# Patient Record
Sex: FEMALE | Race: AMERICAN INDIAN OR ALASKA NATIVE | NOT HISPANIC OR LATINO
[De-identification: names, ages, dates, MRNs, and addresses within clinical notes are randomized per-mention and may not be internally consistent; named-entity substitution may affect disease eponyms.]

---

## 2017-01-06 ENCOUNTER — MEDICATION RENEWAL (OUTPATIENT)
Age: 67
End: 2017-01-06

## 2017-01-10 ENCOUNTER — APPOINTMENT (OUTPATIENT)
Dept: INTERNAL MEDICINE | Facility: CLINIC | Age: 67
End: 2017-01-10

## 2017-01-10 ENCOUNTER — RESULT CHARGE (OUTPATIENT)
Age: 67
End: 2017-01-10

## 2017-01-10 VITALS
BODY MASS INDEX: 28.27 KG/M2 | SYSTOLIC BLOOD PRESSURE: 130 MMHG | WEIGHT: 144 LBS | HEIGHT: 60 IN | DIASTOLIC BLOOD PRESSURE: 64 MMHG

## 2017-01-10 PROBLEM — R00.2 PALPITATIONS: Status: ACTIVE | Noted: 2017-01-10

## 2017-01-10 LAB
GLUCOSE BLDC GLUCOMTR-MCNC: 86
HBA1C MFR BLD HPLC: 7.5

## 2017-01-13 ENCOUNTER — RX RENEWAL (OUTPATIENT)
Age: 67
End: 2017-01-13

## 2017-01-15 ENCOUNTER — NON-APPOINTMENT (OUTPATIENT)
Age: 67
End: 2017-01-15

## 2017-01-31 ENCOUNTER — APPOINTMENT (OUTPATIENT)
Dept: OPHTHALMOLOGY | Facility: CLINIC | Age: 67
End: 2017-01-31

## 2017-01-31 DIAGNOSIS — H26.9 UNSPECIFIED CATARACT: ICD-10-CM

## 2017-01-31 DIAGNOSIS — H04.123 DRY EYE SYNDROME OF BILATERAL LACRIMAL GLANDS: ICD-10-CM

## 2017-02-01 DIAGNOSIS — E55.9 VITAMIN D DEFICIENCY, UNSPECIFIED: ICD-10-CM

## 2017-02-01 RX ORDER — UBIDECARENONE/VIT E ACET 100MG-5
50 MCG CAPSULE ORAL
Refills: 0 | Status: ACTIVE | COMMUNITY

## 2017-02-07 ENCOUNTER — RESULT REVIEW (OUTPATIENT)
Age: 67
End: 2017-02-07

## 2017-04-25 ENCOUNTER — APPOINTMENT (OUTPATIENT)
Dept: INTERNAL MEDICINE | Facility: CLINIC | Age: 67
End: 2017-04-25

## 2017-04-25 ENCOUNTER — RESULT CHARGE (OUTPATIENT)
Age: 67
End: 2017-04-25

## 2017-04-25 VITALS
DIASTOLIC BLOOD PRESSURE: 70 MMHG | WEIGHT: 142 LBS | HEIGHT: 60 IN | SYSTOLIC BLOOD PRESSURE: 142 MMHG | BODY MASS INDEX: 27.88 KG/M2

## 2017-04-25 DIAGNOSIS — K64.9 UNSPECIFIED HEMORRHOIDS: ICD-10-CM

## 2017-04-25 LAB
BILIRUB UR QL STRIP: NORMAL
CLARITY UR: CLEAR
COLLECTION METHOD: NORMAL
GLUCOSE UR-MCNC: NORMAL
HCG UR QL: 0.2 EU/DL
HGB UR QL STRIP.AUTO: NORMAL
KETONES UR-MCNC: NORMAL
LEUKOCYTE ESTERASE UR QL STRIP: NORMAL
NITRITE UR QL STRIP: NORMAL
PH UR STRIP: 6
PROT UR STRIP-MCNC: NORMAL
SP GR UR STRIP: 1.01

## 2017-04-25 RX ORDER — GLYCERIN/MIN OIL/POLYCARBOPHIL
GEL WITH APPLICATOR (GRAM) VAGINAL
Qty: 1 | Refills: 0 | Status: ACTIVE | COMMUNITY
Start: 2017-04-25 | End: 1900-01-01

## 2017-04-25 RX ORDER — HYDROCORTISONE ACETATE 25 MG/1
25 SUPPOSITORY RECTAL TWICE DAILY
Qty: 24 | Refills: 0 | Status: COMPLETED | COMMUNITY
Start: 2017-04-25 | End: 2017-05-07

## 2017-06-21 ENCOUNTER — MEDICATION RENEWAL (OUTPATIENT)
Age: 67
End: 2017-06-21

## 2017-06-22 ENCOUNTER — RX RENEWAL (OUTPATIENT)
Age: 67
End: 2017-06-22

## 2017-07-07 ENCOUNTER — MEDICATION RENEWAL (OUTPATIENT)
Age: 67
End: 2017-07-07

## 2017-07-13 ENCOUNTER — EMERGENCY (EMERGENCY)
Facility: HOSPITAL | Age: 67
LOS: 1 days | End: 2017-07-13
Attending: EMERGENCY MEDICINE | Admitting: EMERGENCY MEDICINE
Payer: MEDICARE

## 2017-07-13 VITALS
TEMPERATURE: 99 F | DIASTOLIC BLOOD PRESSURE: 83 MMHG | OXYGEN SATURATION: 97 % | RESPIRATION RATE: 18 BRPM | SYSTOLIC BLOOD PRESSURE: 165 MMHG | HEART RATE: 63 BPM

## 2017-07-13 VITALS
DIASTOLIC BLOOD PRESSURE: 84 MMHG | HEART RATE: 56 BPM | RESPIRATION RATE: 16 BRPM | TEMPERATURE: 98 F | SYSTOLIC BLOOD PRESSURE: 161 MMHG | OXYGEN SATURATION: 96 %

## 2017-07-13 PROCEDURE — 99284 EMERGENCY DEPT VISIT MOD MDM: CPT | Mod: 25

## 2017-07-13 PROCEDURE — 73502 X-RAY EXAM HIP UNI 2-3 VIEWS: CPT | Mod: 26,LT

## 2017-07-13 PROCEDURE — 73502 X-RAY EXAM HIP UNI 2-3 VIEWS: CPT

## 2017-07-13 PROCEDURE — 71046 X-RAY EXAM CHEST 2 VIEWS: CPT

## 2017-07-13 PROCEDURE — 71020: CPT | Mod: 26

## 2017-07-13 RX ORDER — ACETAMINOPHEN 500 MG
650 TABLET ORAL ONCE
Qty: 0 | Refills: 0 | Status: COMPLETED | OUTPATIENT
Start: 2017-07-13 | End: 2017-07-13

## 2017-07-13 RX ADMIN — Medication 650 MILLIGRAM(S): at 10:08

## 2017-07-13 NOTE — ED PROVIDER NOTE - CARE PLAN
Principal Discharge DX:	Hip pain  Instructions for follow-up, activity and diet:	1) You were here for hip pain.    2) Take motrin or tylenol as needed for pain.    3) Follow up with your primary doctor for further evaluation and to answer any questions you have.    4) Return to the emergency department if you experience worsening symptoms, pain, fever, chills, nausea, vomiting or other concerning symptoms.

## 2017-07-13 NOTE — ED PROVIDER NOTE - PLAN OF CARE
1) You were here for hip pain.    2) Take motrin or tylenol as needed for pain.    3) Follow up with your primary doctor for further evaluation and to answer any questions you have.    4) Return to the emergency department if you experience worsening symptoms, pain, fever, chills, nausea, vomiting or other concerning symptoms.

## 2017-07-13 NOTE — ED PROVIDER NOTE - NS ED ROS FT
Constitutional: No fever or chills  Eyes: No visual changes  HEENT: No throat pain  CV: No chest pain  Resp: No SOB no cough  GI: No abd pain, nausea or vomiting  : No dysuria  MSK: As per hpi  Skin: No rash  Neuro: No headache

## 2017-07-13 NOTE — ED PROVIDER NOTE - ATTENDING CONTRIBUTION TO CARE
Patient presenting with L sided hip pain after fall 4 days ago.  Able to stand and ambulate after fall, having continued pain.  No head trauma or LOC, no numbness, tingling and weakness.    On exam patient well appearing, vital signs within normal limits, in no apparent distress.  No noted traumatic injuries on exam, slight L posterior iliac wing/lower lumbar tenderness.    Plain films for fracture/injury, pain control re-evaluate.

## 2017-07-13 NOTE — ED PROVIDER NOTE - OBJECTIVE STATEMENT
66F with past medical history Hypertension, hl, diabetes mellitus presents with with 4d h/o LLBP since fall.  Had mechanical fall 4d ago when tripped over stoop, fell onto L side with immediate pain in LLB/hip.  Able to ambulate immediately but pain has persisted.  Pain is dull, constant, w/w laying down.  Denies radiation down L leg, numbness, weakness, CP, shortness of breath, abdominal pain, fever, chills, bowel/bladder incontinence, diarrhea, constipation, rash.

## 2017-07-13 NOTE — ED ADULT NURSE NOTE - OBJECTIVE STATEMENT
66 yrs old female with h/o htn, dm present to the ER for generalized weakness and pain to the left hip and shoulder. Pt reports that she was not feeling well on Saturday and was experiencing  dizziness and generalized weakness when she fell down the steps in her house. Pt denies hitting her head or LOC. Pt however complaining of pain to left shoulder and hip. Report pain level of 8/10 on pain scale and aching in quality. Pt is experiencing difficulty bending , however she is able to ambulate independently. Pt also report burning sensation in the perineal area for a couple of months now and state she was using OTC cream which has not been working. Pt denies nausea, vomiting, shortness of breath/ CP

## 2017-07-13 NOTE — ED PROVIDER NOTE - PHYSICAL EXAMINATION
***GEN - well appearing; NAD   ***HEAD - NC/AT  ***EYES/NOSE - PEERL, EOMI, mucous membranes moist, no discharge   ***THROAT: Oral cavity and pharynx normal. No inflammation, swelling, exudate, or lesions.    ***NECK: supple, non-tender no lymphadenopathy  ***PULMONARY - CTA b/l, symmetric breath sounds.   ***CARDIAC- s1s2, RRR, no murmur  ***ABDOMEN - +BS, ND, NT, soft, no guarding, no rebound, no organomegaly  ***BACK - no CVA tenderness, Normal  spine   ***EXTREMITIES - symmetric pulses, 2+ dp, capillary refill < 2 seconds, no clubbing, no cyanosis, no edema   ***SKIN - warm, dry, intact, no rash or bruising   ***NEUROLOGIC - a&o x3, CN3-12 intact, sensation nl, motor 5/5 RUE/LUE/RLE/LLE gait normal,

## 2017-07-25 ENCOUNTER — APPOINTMENT (OUTPATIENT)
Dept: INTERNAL MEDICINE | Facility: CLINIC | Age: 67
End: 2017-07-25

## 2017-07-25 VITALS
WEIGHT: 141 LBS | HEART RATE: 66 BPM | HEIGHT: 60 IN | OXYGEN SATURATION: 98 % | DIASTOLIC BLOOD PRESSURE: 80 MMHG | SYSTOLIC BLOOD PRESSURE: 140 MMHG | BODY MASS INDEX: 27.68 KG/M2

## 2017-07-25 DIAGNOSIS — N94.9 UNSPECIFIED CONDITION ASSOCIATED WITH FEMALE GENITAL ORGANS AND MENSTRUAL CYCLE: ICD-10-CM

## 2017-07-25 LAB
GLUCOSE BLDC GLUCOMTR-MCNC: 149
HBA1C MFR BLD HPLC: 7.4

## 2017-09-14 ENCOUNTER — APPOINTMENT (OUTPATIENT)
Dept: OBGYN | Facility: CLINIC | Age: 67
End: 2017-09-14
Payer: MEDICARE

## 2017-09-14 VITALS
SYSTOLIC BLOOD PRESSURE: 140 MMHG | BODY MASS INDEX: 27.54 KG/M2 | WEIGHT: 140.25 LBS | DIASTOLIC BLOOD PRESSURE: 70 MMHG | HEIGHT: 60 IN

## 2017-09-14 DIAGNOSIS — Z01.419 ENCOUNTER FOR GYNECOLOGICAL EXAMINATION (GENERAL) (ROUTINE) W/OUT ABNORMAL FINDINGS: ICD-10-CM

## 2017-09-14 DIAGNOSIS — N95.2 POSTMENOPAUSAL ATROPHIC VAGINITIS: ICD-10-CM

## 2017-09-14 LAB
GLUCOSE UR-MCNC: NORMAL
HCG UR QL: 0.2 EU/DL
HGB UR QL STRIP.AUTO: NORMAL
KETONES UR-MCNC: NORMAL
LEUKOCYTE ESTERASE UR QL STRIP: NORMAL
NITRITE UR QL STRIP: NORMAL
PH UR STRIP: 7
PROT UR STRIP-MCNC: NORMAL
SP GR UR STRIP: 1.01

## 2017-09-14 PROCEDURE — 99387 INIT PM E/M NEW PAT 65+ YRS: CPT | Mod: 25,GY

## 2017-09-14 PROCEDURE — 81002 URINALYSIS NONAUTO W/O SCOPE: CPT

## 2017-09-14 RX ORDER — ESTRADIOL 10 UG/1
10 TABLET VAGINAL
Qty: 1 | Refills: 4 | Status: ACTIVE | COMMUNITY
Start: 2017-09-14 | End: 1900-01-01

## 2017-09-16 LAB — BACTERIA UR CULT: NORMAL

## 2017-09-18 ENCOUNTER — RX RENEWAL (OUTPATIENT)
Age: 67
End: 2017-09-18

## 2017-09-19 LAB — CYTOLOGY CVX/VAG DOC THIN PREP: NORMAL

## 2017-12-28 ENCOUNTER — RX RENEWAL (OUTPATIENT)
Age: 67
End: 2017-12-28

## 2018-02-09 ENCOUNTER — RX RENEWAL (OUTPATIENT)
Age: 68
End: 2018-02-09

## 2018-03-12 ENCOUNTER — APPOINTMENT (OUTPATIENT)
Dept: INTERNAL MEDICINE | Facility: CLINIC | Age: 68
End: 2018-03-12
Payer: MEDICARE

## 2018-03-12 VITALS
WEIGHT: 140 LBS | BODY MASS INDEX: 27.48 KG/M2 | HEART RATE: 76 BPM | OXYGEN SATURATION: 98 % | SYSTOLIC BLOOD PRESSURE: 138 MMHG | DIASTOLIC BLOOD PRESSURE: 76 MMHG | HEIGHT: 60 IN

## 2018-03-12 LAB
GLUCOSE BLDC GLUCOMTR-MCNC: 145
HBA1C MFR BLD HPLC: 7.1

## 2018-03-12 PROCEDURE — 83036 HEMOGLOBIN GLYCOSYLATED A1C: CPT | Mod: QW

## 2018-03-12 PROCEDURE — 99213 OFFICE O/P EST LOW 20 MIN: CPT | Mod: 25

## 2018-03-12 PROCEDURE — 82962 GLUCOSE BLOOD TEST: CPT

## 2018-03-27 ENCOUNTER — NON-APPOINTMENT (OUTPATIENT)
Age: 68
End: 2018-03-27

## 2018-03-27 ENCOUNTER — APPOINTMENT (OUTPATIENT)
Dept: INTERNAL MEDICINE | Facility: CLINIC | Age: 68
End: 2018-03-27
Payer: MEDICARE

## 2018-03-27 VITALS
SYSTOLIC BLOOD PRESSURE: 140 MMHG | DIASTOLIC BLOOD PRESSURE: 72 MMHG | OXYGEN SATURATION: 98 % | HEART RATE: 62 BPM | BODY MASS INDEX: 27.88 KG/M2 | WEIGHT: 142 LBS | HEIGHT: 60 IN

## 2018-03-27 PROCEDURE — 90732 PPSV23 VACC 2 YRS+ SUBQ/IM: CPT

## 2018-03-27 PROCEDURE — G0439: CPT | Mod: 25

## 2018-03-27 PROCEDURE — G0009: CPT

## 2018-03-27 RX ORDER — SILVER SULFADIAZINE 10 MG/G
1 CREAM TOPICAL DAILY
Qty: 1 | Refills: 0 | Status: DISCONTINUED | COMMUNITY
Start: 2018-03-12 | End: 2018-03-27

## 2018-05-07 LAB
25(OH)D3 SERPL-MCNC: 32.2 NG/ML
ADJUSTED MITOGEN: >10 IU/ML
ADJUSTED TB AG: 0.07 IU/ML
ALBUMIN SERPL ELPH-MCNC: 4.5 G/DL
ALP BLD-CCNC: 75 U/L
ALT SERPL-CCNC: 15 U/L
ANION GAP SERPL CALC-SCNC: 14 MMOL/L
AST SERPL-CCNC: 18 U/L
BASOPHILS # BLD AUTO: 0.03 K/UL
BASOPHILS NFR BLD AUTO: 0.3 %
BILIRUB SERPL-MCNC: 0.3 MG/DL
BUN SERPL-MCNC: 9 MG/DL
CALCIUM SERPL-MCNC: 10.1 MG/DL
CHLORIDE SERPL-SCNC: 98 MMOL/L
CHOLEST SERPL-MCNC: 160 MG/DL
CHOLEST/HDLC SERPL: 3 RATIO
CO2 SERPL-SCNC: 29 MMOL/L
CREAT SERPL-MCNC: 0.61 MG/DL
CREAT SPEC-SCNC: 21 MG/DL
EOSINOPHIL # BLD AUTO: 0.12 K/UL
EOSINOPHIL NFR BLD AUTO: 1.3 %
FOLATE SERPL-MCNC: >20 NG/ML
GLUCOSE SERPL-MCNC: 92 MG/DL
HCT VFR BLD CALC: 42.6 %
HDLC SERPL-MCNC: 54 MG/DL
HGB BLD-MCNC: 13.5 G/DL
IMM GRANULOCYTES NFR BLD AUTO: 0.2 %
LDLC SERPL CALC-MCNC: 64 MG/DL
LYMPHOCYTES # BLD AUTO: 3.97 K/UL
LYMPHOCYTES NFR BLD AUTO: 44.1 %
M TB IFN-G BLD-IMP: NEGATIVE
MAN DIFF?: NORMAL
MCHC RBC-ENTMCNC: 29 PG
MCHC RBC-ENTMCNC: 31.7 GM/DL
MCV RBC AUTO: 91.4 FL
MICROALBUMIN 24H UR DL<=1MG/L-MCNC: <0.3 MG/DL
MICROALBUMIN/CREAT 24H UR-RTO: NORMAL
MONOCYTES # BLD AUTO: 0.56 K/UL
MONOCYTES NFR BLD AUTO: 6.2 %
NEUTROPHILS # BLD AUTO: 4.31 K/UL
NEUTROPHILS NFR BLD AUTO: 47.9 %
PLATELET # BLD AUTO: 231 K/UL
POTASSIUM SERPL-SCNC: 4.4 MMOL/L
PROT SERPL-MCNC: 8.3 G/DL
QUANTIFERON GOLD NIL: 0.07 IU/ML
RBC # BLD: 4.66 M/UL
RBC # FLD: 13.2 %
SODIUM SERPL-SCNC: 141 MMOL/L
T4 FREE SERPL-MCNC: 1.5 NG/DL
TRIGL SERPL-MCNC: 211 MG/DL
TSH SERPL-ACNC: 1.02 UIU/ML
VIT B12 SERPL-MCNC: 1132 PG/ML
WBC # FLD AUTO: 9.01 K/UL

## 2018-05-18 ENCOUNTER — RX RENEWAL (OUTPATIENT)
Age: 68
End: 2018-05-18

## 2018-06-04 ENCOUNTER — FORM ENCOUNTER (OUTPATIENT)
Age: 68
End: 2018-06-04

## 2018-06-05 ENCOUNTER — APPOINTMENT (OUTPATIENT)
Dept: MAMMOGRAPHY | Facility: IMAGING CENTER | Age: 68
End: 2018-06-05
Payer: MEDICARE

## 2018-06-05 ENCOUNTER — OUTPATIENT (OUTPATIENT)
Dept: OUTPATIENT SERVICES | Facility: HOSPITAL | Age: 68
LOS: 1 days | End: 2018-06-05
Payer: MEDICARE

## 2018-06-05 DIAGNOSIS — Z01.419 ENCOUNTER FOR GYNECOLOGICAL EXAMINATION (GENERAL) (ROUTINE) WITHOUT ABNORMAL FINDINGS: ICD-10-CM

## 2018-06-05 PROCEDURE — 77063 BREAST TOMOSYNTHESIS BI: CPT | Mod: 26

## 2018-06-05 PROCEDURE — 77063 BREAST TOMOSYNTHESIS BI: CPT

## 2018-06-05 PROCEDURE — 77067 SCR MAMMO BI INCL CAD: CPT | Mod: 26

## 2018-06-05 PROCEDURE — 77067 SCR MAMMO BI INCL CAD: CPT

## 2018-07-19 PROBLEM — E11.9 TYPE 2 DIABETES MELLITUS WITHOUT COMPLICATIONS: Chronic | Status: ACTIVE | Noted: 2017-07-13

## 2018-07-19 NOTE — ED PROVIDER NOTE - PROGRESS NOTE DETAILS
Alba   ( Rachell )  calling to ask when Ilda should schedule Px and any immunizations if needed. [phone]   Patient feels better.  Pain improved.  XR negative.  Will discharge.

## 2018-07-22 PROBLEM — E55.9 VITAMIN D INSUFFICIENCY: Status: ACTIVE | Noted: 2017-02-01

## 2018-07-24 ENCOUNTER — APPOINTMENT (OUTPATIENT)
Dept: INTERNAL MEDICINE | Facility: CLINIC | Age: 68
End: 2018-07-24
Payer: MEDICARE

## 2018-07-24 VITALS
OXYGEN SATURATION: 98 % | WEIGHT: 143 LBS | BODY MASS INDEX: 28.07 KG/M2 | HEIGHT: 60 IN | SYSTOLIC BLOOD PRESSURE: 150 MMHG | DIASTOLIC BLOOD PRESSURE: 72 MMHG | HEART RATE: 71 BPM

## 2018-07-24 PROCEDURE — 99213 OFFICE O/P EST LOW 20 MIN: CPT

## 2018-07-24 NOTE — PHYSICAL EXAM
[No Acute Distress] : no acute distress [de-identified] : neck paraspinal muscle/trapezius muscle spasm. normal ROM and no swelling at both shoulders. normal strength at left shoulder [de-identified] : no skin changes over the left arm

## 2018-07-30 PROBLEM — N95.2 POSTMENOPAUSAL ATROPHIC VAGINITIS: Status: ACTIVE | Noted: 2017-04-25

## 2018-09-07 ENCOUNTER — RX RENEWAL (OUTPATIENT)
Age: 68
End: 2018-09-07

## 2018-11-29 ENCOUNTER — RX RENEWAL (OUTPATIENT)
Age: 68
End: 2018-11-29

## 2019-01-29 ENCOUNTER — NON-APPOINTMENT (OUTPATIENT)
Age: 69
End: 2019-01-29

## 2019-01-29 ENCOUNTER — APPOINTMENT (OUTPATIENT)
Dept: INTERNAL MEDICINE | Facility: CLINIC | Age: 69
End: 2019-01-29
Payer: MEDICARE

## 2019-01-29 ENCOUNTER — RX RENEWAL (OUTPATIENT)
Age: 69
End: 2019-01-29

## 2019-01-29 VITALS
WEIGHT: 143 LBS | DIASTOLIC BLOOD PRESSURE: 80 MMHG | BODY MASS INDEX: 28.07 KG/M2 | OXYGEN SATURATION: 97 % | HEIGHT: 60 IN | SYSTOLIC BLOOD PRESSURE: 132 MMHG | HEART RATE: 71 BPM

## 2019-01-29 LAB
GLUCOSE BLDC GLUCOMTR-MCNC: 137
HBA1C MFR BLD HPLC: 7.1

## 2019-01-29 PROCEDURE — 82962 GLUCOSE BLOOD TEST: CPT

## 2019-01-29 PROCEDURE — 99214 OFFICE O/P EST MOD 30 MIN: CPT | Mod: 25

## 2019-01-29 PROCEDURE — 83036 HEMOGLOBIN GLYCOSYLATED A1C: CPT | Mod: QW

## 2019-01-29 PROCEDURE — 93000 ELECTROCARDIOGRAM COMPLETE: CPT

## 2019-02-01 ENCOUNTER — RX RENEWAL (OUTPATIENT)
Age: 69
End: 2019-02-01

## 2019-02-03 NOTE — REVIEW OF SYSTEMS
[Negative] : Genitourinary [FreeTextEntry5] : see HPI [FreeTextEntry6] : s [FreeTextEntry7] : see HPI

## 2019-02-03 NOTE — COUNSELING
[Healthy eating counseling provided] : healthy eating [None] : None [Needs reinforcement, provided] : Patient needs reinforcement on understanding lifestyle changes and  the steps needed to achieve self management goals and reinforcement was provided

## 2019-02-03 NOTE — ASSESSMENT
[FreeTextEntry1] : 1.  Exertional shortness of breath with associated chest pressure in a diabetic with a history of hypertension and hyperlipidemia.  Patient with multiple CAD risk factors.  Needs a nuclear stress test to delineate for ischemia.  We will have her hold her beta-blocker for 5 days prior to a stress test.\par 2.  Diabetes -hemoglobin A1c 7.1.  Continue current management\par 3.  Hyperlipidemia -check lipids\par 4.  Abdominal pain, epigastric -differential includes gastritis, GERD, ulcer.  Will start Pepcid 20 mg twice a day so that I can obtain a stool for H. pylori.  After stress test, if that is negative, will send to GI.  She was actually referred to GI in the past but has been afraid to go.\par 5.  Hypertension -BP is acceptable.  Continue current management.  Needs renewed.\par 6.  Return to office after above.

## 2019-02-03 NOTE — HISTORY OF PRESENT ILLNESS
[de-identified] : 68-year-old female with a history of diabetes, hypertension and hyperlipidemia here C/O SOB with stairs, new over the past several weeks.  No chest pain but feels pressure across her chest.  No radiation to the jaw Feels it when she moves faster.  Can get palpitations as well.  No complaints of lightheadedness or dizziness.  No myalgias on her statin.  No complaints of polyuria or polydipsia.  Her sticks are okay in the mornings\par \par C/O epigastric discomfort for a long time, can be worse with food.  This never awakens her at night.

## 2019-03-03 LAB
25(OH)D3 SERPL-MCNC: 35.7 NG/ML
ALBUMIN SERPL ELPH-MCNC: 4.6 G/DL
ALP BLD-CCNC: 79 U/L
ALT SERPL-CCNC: 17 U/L
ANION GAP SERPL CALC-SCNC: 11 MMOL/L
AST SERPL-CCNC: 19 U/L
BASOPHILS # BLD AUTO: 0.02 K/UL
BASOPHILS NFR BLD AUTO: 0.2 %
BILIRUB SERPL-MCNC: 0.3 MG/DL
BUN SERPL-MCNC: 7 MG/DL
CALCIUM SERPL-MCNC: 9.7 MG/DL
CHLORIDE SERPL-SCNC: 103 MMOL/L
CHOLEST SERPL-MCNC: 130 MG/DL
CHOLEST/HDLC SERPL: 2.6 RATIO
CO2 SERPL-SCNC: 26 MMOL/L
CREAT SERPL-MCNC: 0.53 MG/DL
CREAT SPEC-SCNC: 12 MG/DL
EOSINOPHIL # BLD AUTO: 0.07 K/UL
EOSINOPHIL NFR BLD AUTO: 0.8 %
GLUCOSE SERPL-MCNC: 119 MG/DL
HBA1C MFR BLD HPLC: 7.2 %
HCT VFR BLD CALC: 39.5 %
HDLC SERPL-MCNC: 50 MG/DL
HGB BLD-MCNC: 12.6 G/DL
IMM GRANULOCYTES NFR BLD AUTO: 0.2 %
LDLC SERPL CALC-MCNC: 53 MG/DL
LYMPHOCYTES # BLD AUTO: 2.59 K/UL
LYMPHOCYTES NFR BLD AUTO: 31.3 %
MAN DIFF?: NORMAL
MCHC RBC-ENTMCNC: 27.9 PG
MCHC RBC-ENTMCNC: 31.9 GM/DL
MCV RBC AUTO: 87.4 FL
MICROALBUMIN 24H UR DL<=1MG/L-MCNC: <1.2 MG/DL
MICROALBUMIN/CREAT 24H UR-RTO: NORMAL
MONOCYTES # BLD AUTO: 0.46 K/UL
MONOCYTES NFR BLD AUTO: 5.6 %
NEUTROPHILS # BLD AUTO: 5.11 K/UL
NEUTROPHILS NFR BLD AUTO: 61.9 %
PLATELET # BLD AUTO: 200 K/UL
POTASSIUM SERPL-SCNC: 4.4 MMOL/L
PROT SERPL-MCNC: 7.5 G/DL
RBC # BLD: 4.52 M/UL
RBC # FLD: 13.6 %
SODIUM SERPL-SCNC: 140 MMOL/L
T4 FREE SERPL-MCNC: 1.4 NG/DL
TRIGL SERPL-MCNC: 133 MG/DL
TSH SERPL-ACNC: 1.03 UIU/ML
WBC # FLD AUTO: 8.27 K/UL

## 2019-03-04 ENCOUNTER — RX RENEWAL (OUTPATIENT)
Age: 69
End: 2019-03-04

## 2019-03-07 LAB — H PYLORI AG STL QL: DETECTED

## 2019-03-07 RX ORDER — AMOXICILLIN 500 MG/1
500 CAPSULE ORAL
Qty: 56 | Refills: 0 | Status: COMPLETED | COMMUNITY
Start: 2019-03-07 | End: 2019-03-21

## 2019-03-07 RX ORDER — METRONIDAZOLE 500 MG/1
500 TABLET ORAL TWICE DAILY
Qty: 28 | Refills: 0 | Status: COMPLETED | COMMUNITY
Start: 2019-03-07 | End: 2019-03-21

## 2019-03-07 RX ORDER — CLARITHROMYCIN 500 MG/1
500 TABLET, FILM COATED ORAL
Qty: 28 | Refills: 0 | Status: COMPLETED | COMMUNITY
Start: 2019-03-07 | End: 2019-03-21

## 2019-03-08 ENCOUNTER — RX RENEWAL (OUTPATIENT)
Age: 69
End: 2019-03-08

## 2019-03-08 RX ORDER — LANSOPRAZOLE 30 MG/1
30 CAPSULE, DELAYED RELEASE ORAL TWICE DAILY
Qty: 28 | Refills: 0 | Status: COMPLETED | COMMUNITY
Start: 2019-03-07 | End: 1900-01-01

## 2019-04-02 ENCOUNTER — APPOINTMENT (OUTPATIENT)
Dept: INTERNAL MEDICINE | Facility: CLINIC | Age: 69
End: 2019-04-02
Payer: MEDICARE

## 2019-04-02 VITALS
DIASTOLIC BLOOD PRESSURE: 78 MMHG | HEART RATE: 70 BPM | BODY MASS INDEX: 27.48 KG/M2 | OXYGEN SATURATION: 98 % | SYSTOLIC BLOOD PRESSURE: 174 MMHG | WEIGHT: 140 LBS | HEIGHT: 60 IN

## 2019-04-02 PROCEDURE — 99214 OFFICE O/P EST MOD 30 MIN: CPT | Mod: 25

## 2019-04-02 PROCEDURE — G0439: CPT

## 2019-04-02 PROCEDURE — G0442 ANNUAL ALCOHOL SCREEN 15 MIN: CPT | Mod: 59

## 2019-04-02 PROCEDURE — G0444 DEPRESSION SCREEN ANNUAL: CPT | Mod: 59

## 2019-04-02 RX ORDER — GLUCAGON 1 MG
1 KIT INJECTION
Qty: 2 | Refills: 5 | Status: DISCONTINUED | COMMUNITY
Start: 2019-04-02 | End: 2019-04-02

## 2019-04-24 LAB
ALBUMIN SERPL ELPH-MCNC: 4.4 G/DL
ALP BLD-CCNC: 74 U/L
ALT SERPL-CCNC: 17 U/L
AMYLASE/CREAT SERPL: 63 U/L
ANION GAP SERPL CALC-SCNC: 14 MMOL/L
AST SERPL-CCNC: 24 U/L
BASOPHILS # BLD AUTO: 0.04 K/UL
BASOPHILS NFR BLD AUTO: 0.4 %
BILIRUB SERPL-MCNC: 0.2 MG/DL
BUN SERPL-MCNC: 9 MG/DL
CALCIUM SERPL-MCNC: 9.7 MG/DL
CHLORIDE SERPL-SCNC: 98 MMOL/L
CO2 SERPL-SCNC: 26 MMOL/L
CREAT SERPL-MCNC: 0.44 MG/DL
EOSINOPHIL # BLD AUTO: 0.06 K/UL
EOSINOPHIL NFR BLD AUTO: 0.7 %
GLUCOSE SERPL-MCNC: 109 MG/DL
H PYLORI AG STL QL: NOT DETECTED
HCT VFR BLD CALC: 41.4 %
HGB BLD-MCNC: 12.8 G/DL
IMM GRANULOCYTES NFR BLD AUTO: 0.6 %
LPL SERPL-CCNC: 33 U/L
LYMPHOCYTES # BLD AUTO: 2.96 K/UL
LYMPHOCYTES NFR BLD AUTO: 32.6 %
MAN DIFF?: NORMAL
MCHC RBC-ENTMCNC: 27.8 PG
MCHC RBC-ENTMCNC: 30.9 GM/DL
MCV RBC AUTO: 90 FL
MONOCYTES # BLD AUTO: 0.57 K/UL
MONOCYTES NFR BLD AUTO: 6.3 %
NEUTROPHILS # BLD AUTO: 5.39 K/UL
NEUTROPHILS NFR BLD AUTO: 59.4 %
PLATELET # BLD AUTO: 236 K/UL
POTASSIUM SERPL-SCNC: 4.5 MMOL/L
PROT SERPL-MCNC: 7.7 G/DL
RBC # BLD: 4.6 M/UL
RBC # FLD: 13.1 %
SODIUM SERPL-SCNC: 138 MMOL/L
WBC # FLD AUTO: 9.07 K/UL

## 2019-05-06 ENCOUNTER — FORM ENCOUNTER (OUTPATIENT)
Age: 69
End: 2019-05-06

## 2019-05-07 ENCOUNTER — APPOINTMENT (OUTPATIENT)
Dept: ULTRASOUND IMAGING | Facility: CLINIC | Age: 69
End: 2019-05-07
Payer: MEDICARE

## 2019-05-07 ENCOUNTER — OUTPATIENT (OUTPATIENT)
Dept: OUTPATIENT SERVICES | Facility: HOSPITAL | Age: 69
LOS: 1 days | End: 2019-05-07
Payer: MEDICARE

## 2019-05-07 DIAGNOSIS — R10.13 EPIGASTRIC PAIN: ICD-10-CM

## 2019-05-07 PROCEDURE — 76700 US EXAM ABDOM COMPLETE: CPT | Mod: 26

## 2019-05-07 PROCEDURE — 76700 US EXAM ABDOM COMPLETE: CPT

## 2019-05-28 ENCOUNTER — APPOINTMENT (OUTPATIENT)
Dept: OPHTHALMOLOGY | Facility: CLINIC | Age: 69
End: 2019-05-28
Payer: MEDICARE

## 2019-05-28 PROCEDURE — 92004 COMPRE OPH EXAM NEW PT 1/>: CPT

## 2019-06-12 ENCOUNTER — TRANSCRIPTION ENCOUNTER (OUTPATIENT)
Age: 69
End: 2019-06-12

## 2019-06-12 ENCOUNTER — EMERGENCY (EMERGENCY)
Facility: HOSPITAL | Age: 69
LOS: 1 days | Discharge: HOME | End: 2019-06-12
Attending: EMERGENCY MEDICINE
Payer: MEDICARE

## 2019-06-12 VITALS
TEMPERATURE: 99 F | SYSTOLIC BLOOD PRESSURE: 131 MMHG | DIASTOLIC BLOOD PRESSURE: 71 MMHG | RESPIRATION RATE: 20 BRPM | HEART RATE: 78 BPM | OXYGEN SATURATION: 96 %

## 2019-06-12 VITALS
TEMPERATURE: 99 F | HEART RATE: 70 BPM | RESPIRATION RATE: 17 BRPM | OXYGEN SATURATION: 98 % | WEIGHT: 136.91 LBS | HEIGHT: 62 IN | SYSTOLIC BLOOD PRESSURE: 175 MMHG | DIASTOLIC BLOOD PRESSURE: 90 MMHG

## 2019-06-12 LAB
ALBUMIN SERPL ELPH-MCNC: 4.2 G/DL — SIGNIFICANT CHANGE UP (ref 3.3–5)
ALP SERPL-CCNC: 82 U/L — SIGNIFICANT CHANGE UP (ref 40–120)
ALT FLD-CCNC: 14 U/L — SIGNIFICANT CHANGE UP (ref 10–45)
ANION GAP SERPL CALC-SCNC: 14 MMOL/L — SIGNIFICANT CHANGE UP (ref 5–17)
APPEARANCE UR: CLEAR — SIGNIFICANT CHANGE UP
AST SERPL-CCNC: 17 U/L — SIGNIFICANT CHANGE UP (ref 10–40)
BACTERIA # UR AUTO: NEGATIVE — SIGNIFICANT CHANGE UP
BASOPHILS # BLD AUTO: 0 K/UL — SIGNIFICANT CHANGE UP (ref 0–0.2)
BASOPHILS NFR BLD AUTO: 0.5 % — SIGNIFICANT CHANGE UP (ref 0–2)
BILIRUB SERPL-MCNC: 0.4 MG/DL — SIGNIFICANT CHANGE UP (ref 0.2–1.2)
BILIRUB UR-MCNC: NEGATIVE — SIGNIFICANT CHANGE UP
BUN SERPL-MCNC: 6 MG/DL — LOW (ref 7–23)
CALCIUM SERPL-MCNC: 10.1 MG/DL — SIGNIFICANT CHANGE UP (ref 8.4–10.5)
CHLORIDE SERPL-SCNC: 97 MMOL/L — SIGNIFICANT CHANGE UP (ref 96–108)
CO2 SERPL-SCNC: 24 MMOL/L — SIGNIFICANT CHANGE UP (ref 22–31)
COLOR SPEC: COLORLESS — SIGNIFICANT CHANGE UP
CREAT SERPL-MCNC: 0.44 MG/DL — LOW (ref 0.5–1.3)
DIFF PNL FLD: NEGATIVE — SIGNIFICANT CHANGE UP
EOSINOPHIL # BLD AUTO: 0.1 K/UL — SIGNIFICANT CHANGE UP (ref 0–0.5)
EOSINOPHIL NFR BLD AUTO: 1.2 % — SIGNIFICANT CHANGE UP (ref 0–6)
EPI CELLS # UR: 2 /HPF — SIGNIFICANT CHANGE UP
GLUCOSE SERPL-MCNC: 153 MG/DL — HIGH (ref 70–99)
GLUCOSE UR QL: NEGATIVE — SIGNIFICANT CHANGE UP
HCT VFR BLD CALC: 38.1 % — SIGNIFICANT CHANGE UP (ref 34.5–45)
HGB BLD-MCNC: 13.3 G/DL — SIGNIFICANT CHANGE UP (ref 11.5–15.5)
HYALINE CASTS # UR AUTO: 1 /LPF — SIGNIFICANT CHANGE UP (ref 0–2)
KETONES UR-MCNC: NEGATIVE — SIGNIFICANT CHANGE UP
LEUKOCYTE ESTERASE UR-ACNC: NEGATIVE — SIGNIFICANT CHANGE UP
LIDOCAIN IGE QN: 48 U/L — SIGNIFICANT CHANGE UP (ref 7–60)
LYMPHOCYTES # BLD AUTO: 2.3 K/UL — SIGNIFICANT CHANGE UP (ref 1–3.3)
LYMPHOCYTES # BLD AUTO: 33.2 % — SIGNIFICANT CHANGE UP (ref 13–44)
MCHC RBC-ENTMCNC: 29.7 PG — SIGNIFICANT CHANGE UP (ref 27–34)
MCHC RBC-ENTMCNC: 34.9 GM/DL — SIGNIFICANT CHANGE UP (ref 32–36)
MCV RBC AUTO: 85.1 FL — SIGNIFICANT CHANGE UP (ref 80–100)
MONOCYTES # BLD AUTO: 0.7 K/UL — SIGNIFICANT CHANGE UP (ref 0–0.9)
MONOCYTES NFR BLD AUTO: 10.3 % — SIGNIFICANT CHANGE UP (ref 2–14)
NEUTROPHILS # BLD AUTO: 3.8 K/UL — SIGNIFICANT CHANGE UP (ref 1.8–7.4)
NEUTROPHILS NFR BLD AUTO: 54.9 % — SIGNIFICANT CHANGE UP (ref 43–77)
NITRITE UR-MCNC: NEGATIVE — SIGNIFICANT CHANGE UP
PH UR: 6.5 — SIGNIFICANT CHANGE UP (ref 5–8)
PLATELET # BLD AUTO: 242 K/UL — SIGNIFICANT CHANGE UP (ref 150–400)
POTASSIUM SERPL-MCNC: 4.2 MMOL/L — SIGNIFICANT CHANGE UP (ref 3.5–5.3)
POTASSIUM SERPL-SCNC: 4.2 MMOL/L — SIGNIFICANT CHANGE UP (ref 3.5–5.3)
PROT SERPL-MCNC: 7.9 G/DL — SIGNIFICANT CHANGE UP (ref 6–8.3)
PROT UR-MCNC: NEGATIVE — SIGNIFICANT CHANGE UP
RBC # BLD: 4.48 M/UL — SIGNIFICANT CHANGE UP (ref 3.8–5.2)
RBC # FLD: 11.9 % — SIGNIFICANT CHANGE UP (ref 10.3–14.5)
RBC CASTS # UR COMP ASSIST: 0 /HPF — SIGNIFICANT CHANGE UP (ref 0–4)
SODIUM SERPL-SCNC: 135 MMOL/L — SIGNIFICANT CHANGE UP (ref 135–145)
SP GR SPEC: 1 — LOW (ref 1.01–1.02)
UROBILINOGEN FLD QL: NEGATIVE — SIGNIFICANT CHANGE UP
WBC # BLD: 6.9 K/UL — SIGNIFICANT CHANGE UP (ref 3.8–10.5)
WBC # FLD AUTO: 6.9 K/UL — SIGNIFICANT CHANGE UP (ref 3.8–10.5)
WBC UR QL: 0 /HPF — SIGNIFICANT CHANGE UP (ref 0–5)

## 2019-06-12 PROCEDURE — 93010 ELECTROCARDIOGRAM REPORT: CPT

## 2019-06-12 PROCEDURE — 99284 EMERGENCY DEPT VISIT MOD MDM: CPT | Mod: 25

## 2019-06-12 PROCEDURE — 83690 ASSAY OF LIPASE: CPT

## 2019-06-12 PROCEDURE — 87086 URINE CULTURE/COLONY COUNT: CPT

## 2019-06-12 PROCEDURE — 80053 COMPREHEN METABOLIC PANEL: CPT

## 2019-06-12 PROCEDURE — 85027 COMPLETE CBC AUTOMATED: CPT

## 2019-06-12 PROCEDURE — 96374 THER/PROPH/DIAG INJ IV PUSH: CPT | Mod: XU

## 2019-06-12 PROCEDURE — 81001 URINALYSIS AUTO W/SCOPE: CPT

## 2019-06-12 PROCEDURE — 74177 CT ABD & PELVIS W/CONTRAST: CPT

## 2019-06-12 PROCEDURE — 74177 CT ABD & PELVIS W/CONTRAST: CPT | Mod: 26

## 2019-06-12 PROCEDURE — 93005 ELECTROCARDIOGRAM TRACING: CPT

## 2019-06-12 RX ORDER — ACETAMINOPHEN 500 MG
650 TABLET ORAL ONCE
Refills: 0 | Status: COMPLETED | OUTPATIENT
Start: 2019-06-12 | End: 2019-06-12

## 2019-06-12 RX ORDER — SODIUM CHLORIDE 9 MG/ML
1000 INJECTION INTRAMUSCULAR; INTRAVENOUS; SUBCUTANEOUS ONCE
Refills: 0 | Status: COMPLETED | OUTPATIENT
Start: 2019-06-12 | End: 2019-06-12

## 2019-06-12 RX ORDER — ONDANSETRON 8 MG/1
4 TABLET, FILM COATED ORAL ONCE
Refills: 0 | Status: COMPLETED | OUTPATIENT
Start: 2019-06-12 | End: 2019-06-12

## 2019-06-12 RX ADMIN — ONDANSETRON 4 MILLIGRAM(S): 8 TABLET, FILM COATED ORAL at 11:12

## 2019-06-12 RX ADMIN — Medication 650 MILLIGRAM(S): at 11:12

## 2019-06-12 RX ADMIN — SODIUM CHLORIDE 1000 MILLILITER(S): 9 INJECTION INTRAMUSCULAR; INTRAVENOUS; SUBCUTANEOUS at 12:46

## 2019-06-12 RX ADMIN — SODIUM CHLORIDE 1000 MILLILITER(S): 9 INJECTION INTRAMUSCULAR; INTRAVENOUS; SUBCUTANEOUS at 11:12

## 2019-06-12 NOTE — ED PROVIDER NOTE - NSFOLLOWUPINSTRUCTIONS_ED_ALL_ED_FT
return to the emergency room if you experience worsening symptoms, fevers, vomiting, unable to tolerate oral intake, or new concerning symptoms.    follow up with your primary care doctor in 1-2 days.

## 2019-06-12 NOTE — ED ADULT NURSE NOTE - OBJECTIVE STATEMENT
Female 68 years old with history of HTN and DM came in for intermittent sharp josh umbilical pain since Saturday associated with diarrhea and nausea. Denies vomiting and blood in stool. Denies fever, chills and urinary symptoms. Umbilical area tender on palpation. Pt had epigastric pain 3 weeks ago, was seen in Urgent care center, had ultrasound done and was treated with Pantoprazole. Will continue to reassess.

## 2019-06-12 NOTE — ED PROVIDER NOTE - PHYSICAL EXAMINATION
Gen:  alert, awake, no acute distress  HEENT:  atraumatic head, airway clear, pupils equal and round  CV:  rrr, nl S1, S2, no m/r/g  Pulm:  lungs CTA b/l  Abd: soft, LLQ ttp  Ext:  moving all extremities  Neuro:  grossly intact, no focal deficits  Skin:  clear, dry, intact  Psych: AOx3, normal affect, no apparent risk to self or others

## 2019-06-12 NOTE — ED ADULT NURSE NOTE - CHPI ED NUR SYMPTOMS NEG
no abdominal distension/no burning urination/no vomiting/no dysuria/no hematuria/no fever/no blood in stool/no chills

## 2019-06-12 NOTE — ED PROVIDER NOTE - OBJECTIVE STATEMENT
pt with diarrhea and crampy abdominal pain since Saturday, stool is NBNB and loose, pt was on abx 1 month ago but does not remember why.  pt denies any fevers or chills, no vomiting, was sent here from urgent care for further evaluation. no prior abdominal surgeries. h/o htn, dm, high cholesterol

## 2019-06-12 NOTE — ED ADULT TRIAGE NOTE - CHIEF COMPLAINT QUOTE
pt c/o periumbilical pain associated with diarrhea x sat.  pt denies any fevers or chills at this time.

## 2019-06-13 LAB
CULTURE RESULTS: NO GROWTH — SIGNIFICANT CHANGE UP
SPECIMEN SOURCE: SIGNIFICANT CHANGE UP

## 2019-06-20 NOTE — ED PROCEDURE NOTE - ATTENDING CONTRIBUTION TO CARE
Although not present during the study, the study is to have a follow up imaging study and an ultrasound certified attending was available for questioning or direct observation if necessary.

## 2019-07-08 ENCOUNTER — FORM ENCOUNTER (OUTPATIENT)
Age: 69
End: 2019-07-08

## 2019-07-09 ENCOUNTER — APPOINTMENT (OUTPATIENT)
Dept: RADIOLOGY | Facility: IMAGING CENTER | Age: 69
End: 2019-07-09
Payer: MEDICARE

## 2019-07-09 ENCOUNTER — OUTPATIENT (OUTPATIENT)
Dept: OUTPATIENT SERVICES | Facility: HOSPITAL | Age: 69
LOS: 1 days | End: 2019-07-09
Payer: MEDICARE

## 2019-07-09 DIAGNOSIS — E55.9 VITAMIN D DEFICIENCY, UNSPECIFIED: ICD-10-CM

## 2019-07-09 PROCEDURE — 77080 DXA BONE DENSITY AXIAL: CPT | Mod: 26

## 2019-07-09 PROCEDURE — 77080 DXA BONE DENSITY AXIAL: CPT

## 2019-07-23 ENCOUNTER — OUTPATIENT (OUTPATIENT)
Dept: OUTPATIENT SERVICES | Facility: HOSPITAL | Age: 69
LOS: 1 days | End: 2019-07-23
Payer: MEDICARE

## 2019-07-23 ENCOUNTER — APPOINTMENT (OUTPATIENT)
Dept: CV DIAGNOSTICS | Facility: HOSPITAL | Age: 69
End: 2019-07-23

## 2019-07-23 DIAGNOSIS — R07.9 CHEST PAIN, UNSPECIFIED: ICD-10-CM

## 2019-07-23 PROCEDURE — 78452 HT MUSCLE IMAGE SPECT MULT: CPT | Mod: 26

## 2019-07-23 PROCEDURE — 78452 HT MUSCLE IMAGE SPECT MULT: CPT

## 2019-07-23 PROCEDURE — A9500: CPT

## 2019-07-23 PROCEDURE — 93017 CV STRESS TEST TRACING ONLY: CPT

## 2019-07-23 PROCEDURE — 93016 CV STRESS TEST SUPVJ ONLY: CPT

## 2019-07-23 PROCEDURE — 93018 CV STRESS TEST I&R ONLY: CPT

## 2019-07-24 ENCOUNTER — RX RENEWAL (OUTPATIENT)
Age: 69
End: 2019-07-24

## 2019-07-24 ENCOUNTER — CLINICAL ADVICE (OUTPATIENT)
Age: 69
End: 2019-07-24

## 2019-07-24 DIAGNOSIS — R94.39 ABNORMAL RESULT OF OTHER CARDIOVASCULAR FUNCTION STUDY: ICD-10-CM

## 2019-08-08 LAB
ANION GAP SERPL CALC-SCNC: 13 MMOL/L
BUN SERPL-MCNC: 7 MG/DL
CALCIUM SERPL-MCNC: 9.8 MG/DL
CHLORIDE SERPL-SCNC: 100 MMOL/L
CO2 SERPL-SCNC: 25 MMOL/L
CREAT SERPL-MCNC: 0.49 MG/DL
GLUCOSE SERPL-MCNC: 134 MG/DL
POTASSIUM SERPL-SCNC: 4.2 MMOL/L
SODIUM SERPL-SCNC: 138 MMOL/L

## 2019-08-12 ENCOUNTER — FORM ENCOUNTER (OUTPATIENT)
Age: 69
End: 2019-08-12

## 2019-08-13 ENCOUNTER — OUTPATIENT (OUTPATIENT)
Dept: OUTPATIENT SERVICES | Facility: HOSPITAL | Age: 69
LOS: 1 days | End: 2019-08-13
Payer: MEDICARE

## 2019-08-13 ENCOUNTER — APPOINTMENT (OUTPATIENT)
Dept: CARDIOLOGY | Facility: CLINIC | Age: 69
End: 2019-08-13

## 2019-08-13 DIAGNOSIS — E78.5 HYPERLIPIDEMIA, UNSPECIFIED: ICD-10-CM

## 2019-08-13 DIAGNOSIS — R94.39 ABNORMAL RESULT OF OTHER CARDIOVASCULAR FUNCTION STUDY: ICD-10-CM

## 2019-08-13 DIAGNOSIS — R07.9 CHEST PAIN, UNSPECIFIED: ICD-10-CM

## 2019-08-13 DIAGNOSIS — E11.9 TYPE 2 DIABETES MELLITUS WITHOUT COMPLICATIONS: ICD-10-CM

## 2019-08-13 PROCEDURE — 75574 CT ANGIO HRT W/3D IMAGE: CPT | Mod: 26

## 2019-08-13 PROCEDURE — 75574 CT ANGIO HRT W/3D IMAGE: CPT

## 2019-08-29 ENCOUNTER — CLINICAL ADVICE (OUTPATIENT)
Age: 69
End: 2019-08-29

## 2019-10-24 ENCOUNTER — INPATIENT (INPATIENT)
Facility: HOSPITAL | Age: 69
LOS: 0 days | Discharge: HOME | DRG: 247 | End: 2019-10-25
Attending: INTERNAL MEDICINE | Admitting: INTERNAL MEDICINE
Payer: MEDICARE

## 2019-10-24 ENCOUNTER — TRANSCRIPTION ENCOUNTER (OUTPATIENT)
Age: 69
End: 2019-10-24

## 2019-10-24 VITALS
DIASTOLIC BLOOD PRESSURE: 77 MMHG | HEIGHT: 62 IN | WEIGHT: 136.91 LBS | SYSTOLIC BLOOD PRESSURE: 163 MMHG | OXYGEN SATURATION: 98 % | TEMPERATURE: 98 F | HEART RATE: 55 BPM

## 2019-10-24 DIAGNOSIS — R94.39 ABNORMAL RESULT OF OTHER CARDIOVASCULAR FUNCTION STUDY: ICD-10-CM

## 2019-10-24 LAB
ALBUMIN SERPL ELPH-MCNC: 4.4 G/DL — SIGNIFICANT CHANGE UP (ref 3.3–5)
ALP SERPL-CCNC: 74 U/L — SIGNIFICANT CHANGE UP (ref 40–120)
ALT FLD-CCNC: 19 U/L — SIGNIFICANT CHANGE UP (ref 10–45)
ANION GAP SERPL CALC-SCNC: 13 MMOL/L — SIGNIFICANT CHANGE UP (ref 5–17)
AST SERPL-CCNC: 20 U/L — SIGNIFICANT CHANGE UP (ref 10–40)
BILIRUB SERPL-MCNC: 0.2 MG/DL — SIGNIFICANT CHANGE UP (ref 0.2–1.2)
BUN SERPL-MCNC: 7 MG/DL — SIGNIFICANT CHANGE UP (ref 7–23)
CALCIUM SERPL-MCNC: 9.6 MG/DL — SIGNIFICANT CHANGE UP (ref 8.4–10.5)
CHLORIDE SERPL-SCNC: 98 MMOL/L — SIGNIFICANT CHANGE UP (ref 96–108)
CO2 SERPL-SCNC: 27 MMOL/L — SIGNIFICANT CHANGE UP (ref 22–31)
CREAT SERPL-MCNC: 0.41 MG/DL — LOW (ref 0.5–1.3)
GLUCOSE BLDC GLUCOMTR-MCNC: 172 MG/DL — HIGH (ref 70–99)
GLUCOSE BLDC GLUCOMTR-MCNC: 96 MG/DL — SIGNIFICANT CHANGE UP (ref 70–99)
GLUCOSE SERPL-MCNC: 94 MG/DL — SIGNIFICANT CHANGE UP (ref 70–99)
HCT VFR BLD CALC: 39.3 % — SIGNIFICANT CHANGE UP (ref 34.5–45)
HGB BLD-MCNC: 12.9 G/DL — SIGNIFICANT CHANGE UP (ref 11.5–15.5)
MCHC RBC-ENTMCNC: 27.7 PG — SIGNIFICANT CHANGE UP (ref 27–34)
MCHC RBC-ENTMCNC: 32.8 GM/DL — SIGNIFICANT CHANGE UP (ref 32–36)
MCV RBC AUTO: 84.3 FL — SIGNIFICANT CHANGE UP (ref 80–100)
NRBC # BLD: 0 /100 WBCS — SIGNIFICANT CHANGE UP (ref 0–0)
PLATELET # BLD AUTO: 203 K/UL — SIGNIFICANT CHANGE UP (ref 150–400)
POTASSIUM SERPL-MCNC: 3.9 MMOL/L — SIGNIFICANT CHANGE UP (ref 3.5–5.3)
POTASSIUM SERPL-SCNC: 3.9 MMOL/L — SIGNIFICANT CHANGE UP (ref 3.5–5.3)
PROT SERPL-MCNC: 7.7 G/DL — SIGNIFICANT CHANGE UP (ref 6–8.3)
RBC # BLD: 4.66 M/UL — SIGNIFICANT CHANGE UP (ref 3.8–5.2)
RBC # FLD: 12.6 % — SIGNIFICANT CHANGE UP (ref 10.3–14.5)
SODIUM SERPL-SCNC: 138 MMOL/L — SIGNIFICANT CHANGE UP (ref 135–145)
WBC # BLD: 8.31 K/UL — SIGNIFICANT CHANGE UP (ref 3.8–10.5)
WBC # FLD AUTO: 8.31 K/UL — SIGNIFICANT CHANGE UP (ref 3.8–10.5)

## 2019-10-24 PROCEDURE — 93458 L HRT ARTERY/VENTRICLE ANGIO: CPT | Mod: 26,59,GC

## 2019-10-24 PROCEDURE — 92928 PRQ TCAT PLMT NTRAC ST 1 LES: CPT | Mod: RC,GC

## 2019-10-24 PROCEDURE — 93010 ELECTROCARDIOGRAM REPORT: CPT | Mod: 76

## 2019-10-24 PROCEDURE — 99152 MOD SED SAME PHYS/QHP 5/>YRS: CPT | Mod: GC

## 2019-10-24 RX ORDER — ASPIRIN/CALCIUM CARB/MAGNESIUM 324 MG
81 TABLET ORAL DAILY
Refills: 0 | Status: DISCONTINUED | OUTPATIENT
Start: 2019-10-24 | End: 2019-10-25

## 2019-10-24 RX ORDER — PANTOPRAZOLE SODIUM 20 MG/1
40 TABLET, DELAYED RELEASE ORAL
Refills: 0 | Status: DISCONTINUED | OUTPATIENT
Start: 2019-10-24 | End: 2019-10-25

## 2019-10-24 RX ORDER — INFLUENZA VIRUS VACCINE 15; 15; 15; 15 UG/.5ML; UG/.5ML; UG/.5ML; UG/.5ML
0.5 SUSPENSION INTRAMUSCULAR ONCE
Refills: 0 | Status: DISCONTINUED | OUTPATIENT
Start: 2019-10-24 | End: 2019-10-25

## 2019-10-24 RX ORDER — METOPROLOL TARTRATE 50 MG
0 TABLET ORAL
Qty: 0 | Refills: 0 | DISCHARGE

## 2019-10-24 RX ORDER — METFORMIN HYDROCHLORIDE 850 MG/1
0 TABLET ORAL
Qty: 0 | Refills: 0 | DISCHARGE

## 2019-10-24 RX ORDER — METFORMIN HYDROCHLORIDE 850 MG/1
1 TABLET ORAL
Qty: 0 | Refills: 0 | DISCHARGE

## 2019-10-24 RX ORDER — HYDRALAZINE HCL 50 MG
0 TABLET ORAL
Qty: 0 | Refills: 0 | DISCHARGE

## 2019-10-24 RX ORDER — SIMVASTATIN 20 MG/1
0 TABLET, FILM COATED ORAL
Qty: 0 | Refills: 0 | DISCHARGE

## 2019-10-24 RX ORDER — SIMVASTATIN 20 MG/1
40 TABLET, FILM COATED ORAL AT BEDTIME
Refills: 0 | Status: DISCONTINUED | OUTPATIENT
Start: 2019-10-24 | End: 2019-10-25

## 2019-10-24 RX ORDER — METOPROLOL TARTRATE 50 MG
50 TABLET ORAL
Refills: 0 | Status: DISCONTINUED | OUTPATIENT
Start: 2019-10-24 | End: 2019-10-25

## 2019-10-24 RX ORDER — CLOPIDOGREL BISULFATE 75 MG/1
75 TABLET, FILM COATED ORAL DAILY
Refills: 0 | Status: DISCONTINUED | OUTPATIENT
Start: 2019-10-25 | End: 2019-10-25

## 2019-10-24 RX ORDER — GABAPENTIN 400 MG/1
300 CAPSULE ORAL THREE TIMES A DAY
Refills: 0 | Status: DISCONTINUED | OUTPATIENT
Start: 2019-10-24 | End: 2019-10-25

## 2019-10-24 RX ORDER — HYDRALAZINE HCL 50 MG
50 TABLET ORAL
Refills: 0 | Status: DISCONTINUED | OUTPATIENT
Start: 2019-10-24 | End: 2019-10-25

## 2019-10-24 RX ORDER — GABAPENTIN 400 MG/1
0 CAPSULE ORAL
Qty: 0 | Refills: 0 | DISCHARGE

## 2019-10-24 RX ORDER — CLOPIDOGREL BISULFATE 75 MG/1
1 TABLET, FILM COATED ORAL
Qty: 90 | Refills: 0
Start: 2019-10-24 | End: 2020-01-21

## 2019-10-24 RX ORDER — ASPIRIN/CALCIUM CARB/MAGNESIUM 324 MG
1 TABLET ORAL
Qty: 0 | Refills: 0 | DISCHARGE
Start: 2019-10-24

## 2019-10-24 RX ADMIN — SIMVASTATIN 40 MILLIGRAM(S): 20 TABLET, FILM COATED ORAL at 21:40

## 2019-10-24 RX ADMIN — Medication 20 MILLIGRAM(S): at 18:13

## 2019-10-24 RX ADMIN — Medication 50 MILLIGRAM(S): at 18:13

## 2019-10-24 RX ADMIN — GABAPENTIN 300 MILLIGRAM(S): 400 CAPSULE ORAL at 21:40

## 2019-10-24 NOTE — DISCHARGE NOTE PROVIDER - NSDCFUADDINST_GEN_ALL_CORE_FT
Patient teaching done about DAPT  patient  is made aware to take  antiplatelet therapy as prescribed ( Statin and Aspirin and Plavix  )- as they are needed to keep your stent/s OPEN  patient is aware to take them every day, do not miss or double up on any doses  these medications can only be discontinued by your cardiologist   TEACH BACK used to verify patient's understanding; pateint verbalizes understanding and gives positive feedback . Continue your medications. Do not stop Aspirin or Plavix unless instructed by your cardiologist.  No heavy lifting, strenuous activity, bending, straining or unnecessary stair climbing for 2 weeks.  No driving for 2 days. You may shower 24 hours following procedure but avoid baths and swimming for 1 week. Check groin site for bleeding and/or swelling daily following procedure. Call your doctor/cardiologist immediately for bleeding or swelling or if you have increased/persistent pain or drainage at the site. Follow up with your cardiologist in 1- 2 weeks. You may call [facility] at [phone] (or [phone] after office hours and weekends) with any questions or concerns following your procedure.

## 2019-10-24 NOTE — DISCHARGE NOTE PROVIDER - CARE PROVIDERS DIRECT ADDRESSES
[email or web address]

## 2019-10-24 NOTE — CHART NOTE - NSCHARTNOTEFT_GEN_A_CORE
Removal of Femoral Sheath    Pulses in the right lower extremity are palpable. The patient remained in the supine position. The insertion site was identified and the sutures were removed per protocol.  Small hematoma noted above sheath insertion site prior to removal. The 5 Lao femoral sheath was then removed. Direct pressure was applied for  __20____ minutes.     Monitoring of the right groin and both lower extremities including neuro-vascular checks and vital signs every 15 minutes x 4, then every 30 minutes x 2, then every 1 hour was ordered.    Complications: None    Comments: gauze and tegaderm dressing applied; hematoma resolved    Mireille Loredo NP   [phone]

## 2019-10-24 NOTE — CHART NOTE - NSCHARTNOTEFT_GEN_A_CORE
Patient underwent a PCI procedure and is being admitted as they are at increased risk for major adverse cardiac and vascular events if discharged due to the following high risk characteristics:      Pre- Procedural Clinical Criteria   Unstable angina     Admit- patient underwent a PCI procedure and is being admitted due to high risk characteristics and is considered to be at an increased risk of major adverse cardiovascular events if discharged at this time   AARON x 1 to RPDA via right femoral artery on ASA and PLAVIX no complaints of any CP no sob no palpitations noted Sheath removal at 1830

## 2019-10-24 NOTE — DISCHARGE NOTE PROVIDER - NSDCCPCAREPLAN_GEN_ALL_CORE_FT
PRINCIPAL DISCHARGE DIAGNOSIS  Diagnosis: CAD (coronary artery disease), native coronary artery  Assessment and Plan of Treatment: Low salt, low fat diet.   Weight management.   Take medications as prescribed.    No smoking.  Follow up appointments with your doctor(s)  as instruced. No heavy lifting for 2 weeks, no strenuous activity  or uneccesary stair climbing, no driving for x 2 days,  you may shower 24 hours following procedure but no bathing or swimming for x1  week, no bending, no straining while having a Bowel movement, No strenuous sexual activity for x 1 week check groin for bleeding, pain, tightness or ( golf ball size)  swelling daily following procedure , & follow up with your cardiologist in 1-2 week      SECONDARY DISCHARGE DIAGNOSES  Diagnosis: Type 2 diabetes mellitus  Assessment and Plan of Treatment: Your hemoglobin A1C will be at a normal range (normal range is from 6-8) Continue to follow with your primary care MD or your endocrinologist. Discuss what the goal hemoglobin A1C level is for you.  Follow a heart healthy diabetic diet. If you check your fingerstick glucose at home, call your MD if it is greater than 250mg/dL on 2 occasions or less than 100mg/dL on 2 occasions. Know signs of low blood sugar, such as: dizziness, shakiness, sweating, confusion, hunger, nervousness- drink 4 ounces apple juice if occurs and call your doctor. Know early signs of high blood sugar, such as: frequent urination, increased thirst, blurry vision, fatigue, headache - call your doctor if this occurs.    Diagnosis: Hyperlipidemia  Assessment and Plan of Treatment: Continue with your cholesterol medications. Eat a heart healthy diet that is low in saturated fats and salt, and includes whole grains, fruits, vegetables and lean protein; exercise regularly (consult with your physician or cardiologist first); maintain a heart healthy weight; if you smoke - quit (A resource to help you stop smoking is the [facility] – phone number [phone].). Continue to follow with your primary physician or cardiologist. Your LDL cholesterol will be less than 70mg/dL    Diagnosis: HTN (hypertension)  Assessment and Plan of Treatment: Continue with your blood pressure medications; eat a heart healthy diet with low salt diet; exercise regularly (consult with your physician or cardiologist first); maintain a heart healthy weight; if you smoke - quit (A resource to help you stop smoking is the [facility] – phone number [phone].); include healthy ways to manage stress. Continue to follow with your primary care physician or cardiologist. Your blood pressure will be controlled.

## 2019-10-24 NOTE — DISCHARGE NOTE PROVIDER - NSDCFUADDAPPT_GEN_ALL_CORE_FT
Follow up with Cardiologist in 1-2 weeks and or Primary MD Follow up with Cardiologist or primary physician in 1-2 weeks.

## 2019-10-24 NOTE — H&P CARDIOLOGY - HISTORY OF PRESENT ILLNESS
This is a 70 yo female from [facility] with history of HTN, HLD, type 2 DM presents for outpatient cardiac cath to evaluate abnormal cardiac CT and occasional chest pain.   no implanted cardiac monitors   Dr. Perry -cardiologist - [place] This is a 68 yo female from [facility] with history of HTN, HLD, type 2 DM presents for outpatient cardiac cath to evaluate abnormal cardiac CT and occasional chest pain/ SOB.   no implanted cardiac monitors   Dr. Perry -cardiologist - [place]   IMPRESSION:    CARDIAC CT DONE ON 8/14/2019   1.  Severe, obstructive 1-vessel coronary artery disease:  LM:  minimal non-calcified and calcified plaque  LAD:  mild (30-49%) calcified plaque in the proximal segment  minimal (<30%) non-calcified plaque in the diagonal branch  RI:  mild (30-49%) calcified plaque  LCX:  minimal (<30%) non-calcified and calcified plaque proximally  minimal (<30%) non-calcified plaque in the OM branch  RCA:  minimal (<30%) non-calcified and calcified plaque in the proximal, mid   and distal segments  severe (>70%) mixed non-calcified and calcified plaque at the ostium of   the PDA  mild (30-49%) mixed non-calcified and calcified plaque in the proximal   RPL branch  2.  Moderate coronary artery calcium (Agatston score 181) as delineated   above.    Coronary artery findings communicated with Dr. Carol Kitchen.    JOCE RAMOS M.D., ATTENDING CARDIOLOGIST  This document has been electronically signed.  WENDY DAN M.D., ATTENDING RADIOLOGIST  This document has been electronically signed. Aug 14 2019  3:48PM

## 2019-10-24 NOTE — DISCHARGE NOTE PROVIDER - NSDCCPTREATMENT_GEN_ALL_CORE_FT
PRINCIPAL PROCEDURE  Procedure: Left heart cardiac cath  Findings and Treatment: s/p AARON to RPDA via groin No heavy lifting for 2 weeks, no strenuous activity  or uneccesary stair climbing, no driving for x 2 days,  you may shower 24 hours following procedure but no bathing or swimming for x1  week, no bending, no straining while having a Bowel movement, No strenuous sexual activity for x 1 week check groin for bleeding, pain, tightness or ( golf ball size)  swelling daily following procedure , & follow up with your cardiologist in 1-2 week PRINCIPAL PROCEDURE  Procedure: Left heart cardiac cath  Findings and Treatment: s/p one stent to RPDA artery via right groin

## 2019-10-24 NOTE — DISCHARGE NOTE PROVIDER - HOSPITAL COURSE
This is a 70 yo female from [facility] with history of HTN, HLD, type 2 DM presents for outpatient cardiac cath to evaluate abnormal cardiac CT and occasional chest pain/ SOB.     no implanted cardiac monitors     Dr. Perry -cardiologist - [place]     IMPRESSION:    CARDIAC CT DONE ON 8/14/2019     1.  Severe, obstructive 1-vessel coronary artery disease:    LM:    minimal non-calcified and calcified plaque    LAD:    mild (30-49%) calcified plaque in the proximal segment    minimal (<30%) non-calcified plaque in the diagonal branch    RI:    mild (30-49%) calcified plaque    LCX:    minimal (<30%) non-calcified and calcified plaque proximally    minimal (<30%) non-calcified plaque in the OM branch    RCA:    minimal (<30%) non-calcified and calcified plaque in the proximal, mid     and distal segments    severe (>70%) mixed non-calcified and calcified plaque at the ostium of     the PDA    mild (30-49%) mixed non-calcified and calcified plaque in the proximal     RPL branch    2.  Moderate coronary artery calcium (Agatston score 181) as delineated     above.      Coronary artery findings communicated with Dr. Carol Kitchen.    10/24/19 AARON x 1 to RPDA via right groin on Asa and Plavix no bleeding or hematoma noted This is a 68 yo female from [facility] with history of HTN, HLD, type 2 DM presents for outpatient cardiac cath to evaluate abnormal cardiac CT and occasional chest pain/ SOB.     no implanted cardiac monitors     Dr. Perry -cardiologist - [place]         10/24/19 AARON x 1 to RPDA via right groin. Patient to continue on aspirin and plavix after discharge.     90 days supply Plavix sent to [facility] as patient is staying in [place] for now. Please send remaining refills in AM to patients [facility] in [place] (pharmacy is listed).

## 2019-10-24 NOTE — DISCHARGE NOTE PROVIDER - CARE PROVIDER_API CALL
Nick Pearce (MD)  Cardiovascular Disease; Interventional Cardiology  [address] [place] [address]  Phone: [phone]  Fax: [phone]  Follow Up Time: Nick Pearce)  Cardiovascular Disease; Interventional Cardiology  [address] [place] [address]  Phone: [phone]  Fax: [phone]  Follow Up Time:     Carol Kitchen)  Internal Medicine  [address] [facility] [address]  [place] [address]  Phone: [phone]  Fax: [phone]  Follow Up Time:

## 2019-10-25 ENCOUNTER — TRANSCRIPTION ENCOUNTER (OUTPATIENT)
Age: 69
End: 2019-10-25

## 2019-10-25 VITALS
HEART RATE: 62 BPM | RESPIRATION RATE: 17 BRPM | TEMPERATURE: 98 F | SYSTOLIC BLOOD PRESSURE: 120 MMHG | DIASTOLIC BLOOD PRESSURE: 72 MMHG | OXYGEN SATURATION: 96 %

## 2019-10-25 DIAGNOSIS — E11.9 TYPE 2 DIABETES MELLITUS WITHOUT COMPLICATIONS: ICD-10-CM

## 2019-10-25 DIAGNOSIS — I25.110 ATHEROSCLEROTIC HEART DISEASE OF NATIVE CORONARY ARTERY WITH UNSTABLE ANGINA PECTORIS: ICD-10-CM

## 2019-10-25 DIAGNOSIS — E78.5 HYPERLIPIDEMIA, UNSPECIFIED: ICD-10-CM

## 2019-10-25 DIAGNOSIS — I10 ESSENTIAL (PRIMARY) HYPERTENSION: ICD-10-CM

## 2019-10-25 LAB
ANION GAP SERPL CALC-SCNC: 17 MMOL/L — SIGNIFICANT CHANGE UP (ref 5–17)
BUN SERPL-MCNC: 8 MG/DL — SIGNIFICANT CHANGE UP (ref 7–23)
CALCIUM SERPL-MCNC: 9.1 MG/DL — SIGNIFICANT CHANGE UP (ref 8.4–10.5)
CHLORIDE SERPL-SCNC: 97 MMOL/L — SIGNIFICANT CHANGE UP (ref 96–108)
CO2 SERPL-SCNC: 25 MMOL/L — SIGNIFICANT CHANGE UP (ref 22–31)
CREAT SERPL-MCNC: 0.47 MG/DL — LOW (ref 0.5–1.3)
GLUCOSE SERPL-MCNC: 139 MG/DL — HIGH (ref 70–99)
HCT VFR BLD CALC: 38.6 % — SIGNIFICANT CHANGE UP (ref 34.5–45)
HGB BLD-MCNC: 12.9 G/DL — SIGNIFICANT CHANGE UP (ref 11.5–15.5)
MCHC RBC-ENTMCNC: 28.2 PG — SIGNIFICANT CHANGE UP (ref 27–34)
MCHC RBC-ENTMCNC: 33.4 GM/DL — SIGNIFICANT CHANGE UP (ref 32–36)
MCV RBC AUTO: 84.3 FL — SIGNIFICANT CHANGE UP (ref 80–100)
NRBC # BLD: 0 /100 WBCS — SIGNIFICANT CHANGE UP (ref 0–0)
PLATELET # BLD AUTO: 198 K/UL — SIGNIFICANT CHANGE UP (ref 150–400)
POTASSIUM SERPL-MCNC: 4.1 MMOL/L — SIGNIFICANT CHANGE UP (ref 3.5–5.3)
POTASSIUM SERPL-SCNC: 4.1 MMOL/L — SIGNIFICANT CHANGE UP (ref 3.5–5.3)
RBC # BLD: 4.58 M/UL — SIGNIFICANT CHANGE UP (ref 3.8–5.2)
RBC # FLD: 12.8 % — SIGNIFICANT CHANGE UP (ref 10.3–14.5)
SODIUM SERPL-SCNC: 139 MMOL/L — SIGNIFICANT CHANGE UP (ref 135–145)
WBC # BLD: 8.18 K/UL — SIGNIFICANT CHANGE UP (ref 3.8–10.5)
WBC # FLD AUTO: 8.18 K/UL — SIGNIFICANT CHANGE UP (ref 3.8–10.5)

## 2019-10-25 PROCEDURE — 99238 HOSP IP/OBS DSCHRG MGMT 30/<: CPT

## 2019-10-25 RX ORDER — CLOPIDOGREL BISULFATE 75 MG/1
1 TABLET, FILM COATED ORAL
Qty: 90 | Refills: 2
Start: 2019-10-25 | End: 2020-07-20

## 2019-10-25 RX ADMIN — Medication 81 MILLIGRAM(S): at 05:29

## 2019-10-25 RX ADMIN — Medication 50 MILLIGRAM(S): at 05:29

## 2019-10-25 RX ADMIN — Medication 20 MILLIGRAM(S): at 05:29

## 2019-10-25 RX ADMIN — CLOPIDOGREL BISULFATE 75 MILLIGRAM(S): 75 TABLET, FILM COATED ORAL at 05:29

## 2019-10-25 RX ADMIN — GABAPENTIN 300 MILLIGRAM(S): 400 CAPSULE ORAL at 05:30

## 2019-10-25 RX ADMIN — Medication 0.1 MILLIGRAM(S): at 05:29

## 2019-10-25 RX ADMIN — Medication 50 MILLIGRAM(S): at 05:30

## 2019-10-25 RX ADMIN — PANTOPRAZOLE SODIUM 40 MILLIGRAM(S): 20 TABLET, DELAYED RELEASE ORAL at 05:30

## 2019-10-25 NOTE — PROGRESS NOTE ADULT - PROBLEM SELECTOR PLAN 1
-s/p AARON x1 rPDA via RFA  -continue aspirin, plavix, simvastatin  -patient stable for discharge home tomorrow pending review of AM labs and groin check

## 2019-10-25 NOTE — DISCHARGE NOTE NURSING/CASE MANAGEMENT/SOCIAL WORK - PATIENT PORTAL LINK FT
You can access the FollowMyHealth Patient Portal offered by [facility] by registering at the following website: [email or web address]. By joining [employer]’s FollowMyHealth portal, you will also be able to view your health information using other applications (apps) compatible with our system.

## 2019-10-25 NOTE — PROGRESS NOTE ADULT - ASSESSMENT
This is a 69 yr old [place] female with history of HTN, HLD, type 2 DM who presented for outpatient cardiac cath 10/24 to evaluate abnormal cardiac CT and occasional chest pain/ SOB. Patient is now s/p AARON x 1 to rPDA via RFA.

## 2019-10-25 NOTE — PROGRESS NOTE ADULT - SUBJECTIVE AND OBJECTIVE BOX
This is a 69 yr old [place] female with history of HTN, HLD, type 2 DM who presented for outpatient cardiac cath 10/24 to evaluate abnormal cardiac CT and occasional chest pain/ SOB. Patient is now s/p AARON x 1 to rPDA via RFA.      SUBJECTIVE / OVERNIGHT EVENTS: patient remains comfortable    REVIEW OF SYSTEMS:    CONSTITUTIONAL: No weakness, fevers or chills  EYES/ENT: No visual changes;  No vertigo or throat pain   NECK: No pain or stiffness  RESPIRATORY: No cough, wheezing, hemoptysis; No shortness of breath  CARDIOVASCULAR: No chest pain or palpitations  GASTROINTESTINAL: No abdominal or epigastric pain. No nausea, vomiting, or hematemesis; No diarrhea or constipation. No melena or hematochezia.  GENITOURINARY: No dysuria, frequency or hematuria  NEUROLOGICAL: No numbness or weakness  SKIN: No itching, rashes      MEDICATIONS  (STANDING):  aspirin enteric coated 81 milliGRAM(s) Oral daily  cloNIDine 0.1 milliGRAM(s) Oral two times a day  clopidogrel Tablet 75 milliGRAM(s) Oral daily  enalapril 20 milliGRAM(s) Oral two times a day  gabapentin 300 milliGRAM(s) Oral three times a day  hydrALAZINE 50 milliGRAM(s) Oral two times a day  influenza   Vaccine 0.5 milliLiter(s) IntraMuscular once  metoprolol tartrate 50 milliGRAM(s) Oral two times a day  pantoprazole    Tablet 40 milliGRAM(s) Oral before breakfast  simvastatin 40 milliGRAM(s) Oral at bedtime    MEDICATIONS  (PRN):        CAPILLARY BLOOD GLUCOSE      POCT Blood Glucose.: 172 mg/dL (24 Oct 2019 20:49)  POCT Blood Glucose.: 96 mg/dL (24 Oct 2019 15:55)    I&O's Summary    24 Oct 2019 07:01  -  25 Oct 2019 00:16  --------------------------------------------------------  IN: 240 mL / OUT: 0 mL / NET: 240 mL        PHYSICAL EXAM:  GENERAL: NAD, well-developed  HEAD:  Atraumatic, Normocephalic  EYES: EOMI, PERRLA, conjunctiva and sclera clear  NECK: Supple, No JVD  CHEST/LUNG: Clear to auscultation bilaterally; No wheeze  HEART: Regular rate and rhythm; No murmurs, rubs, or gallops  ABDOMEN: Soft, Nontender, Nondistended; Bowel sounds present  EXTREMITIES:  2+ Peripheral Pulses, No clubbing, cyanosis, or edema  PSYCH: AAOx3  NEUROLOGY: non-focal  SKIN: No rashes or lesions; RFA cath site stable    LABS:                        12.9   8.31  )-----------( 203      ( 24 Oct 2019 12:38 )             39.3     10-24    138  |  98  |  7   ----------------------------<  94  3.9   |  27  |  0.41<L>    Ca    9.6      24 Oct 2019 12:38    TPro  7.7  /  Alb  4.4  /  TBili  0.2  /  DBili  x   /  AST  20  /  ALT  19  /  AlkPhos  74  10-24

## 2019-11-01 ENCOUNTER — OUTPATIENT (OUTPATIENT)
Dept: OUTPATIENT SERVICES | Facility: HOSPITAL | Age: 69
LOS: 1 days | End: 2019-11-01
Payer: MEDICARE

## 2019-11-01 PROCEDURE — G9001: CPT

## 2019-11-05 ENCOUNTER — APPOINTMENT (OUTPATIENT)
Dept: INTERNAL MEDICINE | Facility: CLINIC | Age: 69
End: 2019-11-05
Payer: MEDICARE

## 2019-11-05 VITALS
WEIGHT: 135 LBS | HEART RATE: 65 BPM | OXYGEN SATURATION: 98 % | SYSTOLIC BLOOD PRESSURE: 172 MMHG | DIASTOLIC BLOOD PRESSURE: 74 MMHG | BODY MASS INDEX: 26.37 KG/M2

## 2019-11-05 DIAGNOSIS — Z23 ENCOUNTER FOR IMMUNIZATION: ICD-10-CM

## 2019-11-05 LAB
GLUCOSE BLDC GLUCOMTR-MCNC: 120
HBA1C MFR BLD HPLC: 7.2

## 2019-11-05 PROCEDURE — 83036 HEMOGLOBIN GLYCOSYLATED A1C: CPT | Mod: QW

## 2019-11-05 PROCEDURE — 99214 OFFICE O/P EST MOD 30 MIN: CPT | Mod: 25

## 2019-11-05 PROCEDURE — 90662 IIV NO PRSV INCREASED AG IM: CPT

## 2019-11-05 PROCEDURE — G0008: CPT

## 2019-11-12 PROCEDURE — 93458 L HRT ARTERY/VENTRICLE ANGIO: CPT | Mod: 59

## 2019-11-12 PROCEDURE — 80048 BASIC METABOLIC PNL TOTAL CA: CPT

## 2019-11-12 PROCEDURE — 99153 MOD SED SAME PHYS/QHP EA: CPT

## 2019-11-12 PROCEDURE — 82962 GLUCOSE BLOOD TEST: CPT

## 2019-11-12 PROCEDURE — C1894: CPT

## 2019-11-12 PROCEDURE — 93005 ELECTROCARDIOGRAM TRACING: CPT

## 2019-11-12 PROCEDURE — C1725: CPT

## 2019-11-12 PROCEDURE — 80053 COMPREHEN METABOLIC PANEL: CPT

## 2019-11-12 PROCEDURE — 99152 MOD SED SAME PHYS/QHP 5/>YRS: CPT

## 2019-11-12 PROCEDURE — C1874: CPT

## 2019-11-12 PROCEDURE — C1769: CPT

## 2019-11-12 PROCEDURE — 85027 COMPLETE CBC AUTOMATED: CPT

## 2019-11-12 PROCEDURE — C1887: CPT

## 2019-11-12 PROCEDURE — C9600: CPT | Mod: RC

## 2019-11-18 DIAGNOSIS — Z71.89 OTHER SPECIFIED COUNSELING: ICD-10-CM

## 2019-11-21 ENCOUNTER — NON-APPOINTMENT (OUTPATIENT)
Age: 69
End: 2019-11-21

## 2019-11-21 ENCOUNTER — APPOINTMENT (OUTPATIENT)
Dept: CARDIOLOGY | Facility: CLINIC | Age: 69
End: 2019-11-21
Payer: MEDICARE

## 2019-11-21 VITALS
WEIGHT: 137 LBS | OXYGEN SATURATION: 98 % | BODY MASS INDEX: 26.9 KG/M2 | HEART RATE: 62 BPM | HEIGHT: 60 IN | DIASTOLIC BLOOD PRESSURE: 73 MMHG | SYSTOLIC BLOOD PRESSURE: 137 MMHG

## 2019-11-21 PROCEDURE — 93000 ELECTROCARDIOGRAM COMPLETE: CPT

## 2019-11-21 PROCEDURE — 99214 OFFICE O/P EST MOD 30 MIN: CPT

## 2019-12-01 NOTE — PHYSICAL EXAM
[Normal Appearance] : normal appearance [General Appearance - Well Developed] : well developed [Well Groomed] : well groomed [General Appearance - Well Nourished] : well nourished [General Appearance - In No Acute Distress] : no acute distress [No Deformities] : no deformities [Normal Conjunctiva] : the conjunctiva exhibited no abnormalities [Eyelids - No Xanthelasma] : the eyelids demonstrated no xanthelasmas [No Oral Pallor] : no oral pallor [Normal Oral Mucosa] : normal oral mucosa [Normal Jugular Venous A Waves Present] : normal jugular venous A waves present [No Oral Cyanosis] : no oral cyanosis [No Jugular Venous Roberts A Waves] : no jugular venous roberts A waves [Normal Jugular Venous V Waves Present] : normal jugular venous V waves present [Respiration, Rhythm And Depth] : normal respiratory rhythm and effort [Exaggerated Use Of Accessory Muscles For Inspiration] : no accessory muscle use [Auscultation Breath Sounds / Voice Sounds] : lungs were clear to auscultation bilaterally [Heart Sounds] : normal S1 and S2 [Heart Rate And Rhythm] : heart rate and rhythm were normal [Arterial Pulses Normal] : the arterial pulses were normal [Edema] : no peripheral edema present [Murmurs] : no murmurs present [Abdomen Tenderness] : non-tender [Abdomen Soft] : soft [Abnormal Walk] : normal gait [Abdomen Mass (___ Cm)] : no abdominal mass palpated [Nail Clubbing] : no clubbing of the fingernails [Gait - Sufficient For Exercise Testing] : the gait was sufficient for exercise testing [Cyanosis, Localized] : no localized cyanosis [Petechial Hemorrhages (___cm)] : no petechial hemorrhages [Skin Color & Pigmentation] : normal skin color and pigmentation [] : no rash [No Skin Ulcers] : no skin ulcer [No Venous Stasis] : no venous stasis [Skin Lesions] : no skin lesions [Oriented To Time, Place, And Person] : oriented to person, place, and time [No Xanthoma] : no  xanthoma was observed [Affect] : the affect was normal [Mood] : the mood was normal [No Anxiety] : not feeling anxious

## 2019-12-01 NOTE — REASON FOR VISIT
[Follow-Up - From Hospitalization] : follow-up of a recent hospitalization for [Coronary Artery Disease] : coronary artery disease [Hyperlipidemia] : hyperlipidemia [Hypertension] : hypertension [Medication Management] : Medication management

## 2019-12-01 NOTE — HISTORY OF PRESENT ILLNESS
[FreeTextEntry1] : Recent PCI to RPDA for dyspnea, abn cardiac CT\par RTO for f/u\par Feels well\par No complaints\par No CP\par No LE edema\par No palpitations\par

## 2019-12-01 NOTE — DISCUSSION/SUMMARY
[FreeTextEntry1] : s/p RPDA AARON\par Feels well\par No angina\par No HF\par BP acceptable\par \par RTO 4-6 mo\par

## 2019-12-08 NOTE — ASSESSMENT
[FreeTextEntry1] : 1.  CAD status post drug-eluting stent doing well.  Continue management as per cardiology.  Now on Plavix.\par 2.  Diabetes under good control.  Hemoglobin A1c 7.2 which is essentially at goal.\par 3.  Hyperlipidemia tolerating a statin.\par 4.  Follow-up in 3 months or as needed.

## 2019-12-08 NOTE — HISTORY OF PRESENT ILLNESS
[de-identified] : 69-year-old female with diabetes, hypertension and CAD here for follow-up of the same.  Recently had a cardiac cath done and she is here for follow-up after placement of a drug-eluting stent.  No complaints of chest pain, shortness of breath or palpitations at this time.  No complaints of polyuria or polydipsia.  No complaints of lightheadedness on her medications

## 2019-12-10 ENCOUNTER — APPOINTMENT (OUTPATIENT)
Dept: INTERNAL MEDICINE | Facility: CLINIC | Age: 69
End: 2019-12-10
Payer: MEDICARE

## 2019-12-10 VITALS
BODY MASS INDEX: 26.56 KG/M2 | SYSTOLIC BLOOD PRESSURE: 138 MMHG | DIASTOLIC BLOOD PRESSURE: 68 MMHG | OXYGEN SATURATION: 97 % | WEIGHT: 136 LBS | HEART RATE: 67 BPM

## 2019-12-10 DIAGNOSIS — K64.4 RESIDUAL HEMORRHOIDAL SKIN TAGS: ICD-10-CM

## 2019-12-10 DIAGNOSIS — Z00.00 ENCOUNTER FOR GENERAL ADULT MEDICAL EXAMINATION W/OUT ABNORMAL FINDINGS: ICD-10-CM

## 2019-12-10 PROCEDURE — 99214 OFFICE O/P EST MOD 30 MIN: CPT

## 2019-12-10 RX ORDER — HYDROCORTISONE ACETATE 25 MG/1
25 SUPPOSITORY RECTAL TWICE DAILY
Qty: 1 | Refills: 0 | Status: COMPLETED | COMMUNITY
Start: 2019-12-10 | End: 2019-12-22

## 2019-12-10 NOTE — HISTORY OF PRESENT ILLNESS
[de-identified] : 70 yo female who comes in today with c/o a cyst on her buttocks.  Has noticed it for a number of days.  It is painful.  No bleeding.  Straining makes it worse.

## 2019-12-10 NOTE — PHYSICAL EXAM
[No Acute Distress] : no acute distress [Well Nourished] : well nourished [Well-Appearing] : well-appearing [Well Developed] : well developed [No Respiratory Distress] : no respiratory distress  [Normal Rate] : normal rate  [Clear to Auscultation] : lungs were clear to auscultation bilaterally [None] : there were no anal fissures seen [Normal S1, S2] : normal S1 and S2 [External Hemorrhoid] : an external hemorrhoid was present [Tender, Swollen] : that was tender and swollen [Thrombosed] : that was thrombosed

## 2019-12-26 ENCOUNTER — EMERGENCY (EMERGENCY)
Facility: HOSPITAL | Age: 69
LOS: 1 days | Discharge: HOME | End: 2019-12-26
Attending: EMERGENCY MEDICINE
Payer: MEDICARE

## 2019-12-26 VITALS
RESPIRATION RATE: 16 BRPM | DIASTOLIC BLOOD PRESSURE: 68 MMHG | TEMPERATURE: 98 F | OXYGEN SATURATION: 100 % | HEART RATE: 68 BPM | SYSTOLIC BLOOD PRESSURE: 107 MMHG

## 2019-12-26 VITALS
RESPIRATION RATE: 18 BRPM | TEMPERATURE: 99 F | WEIGHT: 136.91 LBS | HEIGHT: 62 IN | SYSTOLIC BLOOD PRESSURE: 108 MMHG | OXYGEN SATURATION: 97 % | HEART RATE: 70 BPM | DIASTOLIC BLOOD PRESSURE: 54 MMHG

## 2019-12-26 LAB
B PERT IGG+IGM PNL SER: ABNORMAL
COLOR FLD: YELLOW — SIGNIFICANT CHANGE UP
FLUID INTAKE SUBSTANCE CLASS: SIGNIFICANT CHANGE UP
FLUID SEGMENTED GRANULOCYTES: 69 % — SIGNIFICANT CHANGE UP
GLUCOSE FLD-MCNC: 192 MG/DL — SIGNIFICANT CHANGE UP
GRAM STN FLD: SIGNIFICANT CHANGE UP
LYMPHOCYTES # FLD: 7 % — SIGNIFICANT CHANGE UP
MONOS+MACROS # FLD: 24 % — SIGNIFICANT CHANGE UP
PROT FLD-MCNC: 4.4 G/DL — SIGNIFICANT CHANGE UP
RCV VOL RI: 980 /UL — HIGH (ref 0–0)
SPECIMEN SOURCE: SIGNIFICANT CHANGE UP
SYNOVIAL CRYSTALS CLARITY: SIGNIFICANT CHANGE UP
SYNOVIAL CRYSTALS COLOR: YELLOW
SYNOVIAL CRYSTALS ID: SIGNIFICANT CHANGE UP
SYNOVIAL CRYSTALS TUBE: SIGNIFICANT CHANGE UP
TOTAL NUCLEATED CELL COUNT, BODY FLUID: 1400 /UL — SIGNIFICANT CHANGE UP
TUBE TYPE: SIGNIFICANT CHANGE UP

## 2019-12-26 PROCEDURE — 99284 EMERGENCY DEPT VISIT MOD MDM: CPT | Mod: 25

## 2019-12-26 PROCEDURE — 82945 GLUCOSE OTHER FLUID: CPT

## 2019-12-26 PROCEDURE — 87205 SMEAR GRAM STAIN: CPT

## 2019-12-26 PROCEDURE — 73564 X-RAY EXAM KNEE 4 OR MORE: CPT

## 2019-12-26 PROCEDURE — 87070 CULTURE OTHR SPECIMN AEROBIC: CPT

## 2019-12-26 PROCEDURE — 73564 X-RAY EXAM KNEE 4 OR MORE: CPT | Mod: 26,RT

## 2019-12-26 PROCEDURE — 20610 DRAIN/INJ JOINT/BURSA W/O US: CPT

## 2019-12-26 PROCEDURE — 89051 BODY FLUID CELL COUNT: CPT

## 2019-12-26 PROCEDURE — 84157 ASSAY OF PROTEIN OTHER: CPT

## 2019-12-26 PROCEDURE — 20610 DRAIN/INJ JOINT/BURSA W/O US: CPT | Mod: RT

## 2019-12-26 PROCEDURE — 87075 CULTR BACTERIA EXCEPT BLOOD: CPT

## 2019-12-26 PROCEDURE — 89060 EXAM SYNOVIAL FLUID CRYSTALS: CPT

## 2019-12-26 RX ORDER — LIDOCAINE HCL 20 MG/ML
5 VIAL (ML) INJECTION ONCE
Refills: 0 | Status: COMPLETED | OUTPATIENT
Start: 2019-12-26 | End: 2019-12-26

## 2019-12-26 RX ORDER — OXYCODONE HYDROCHLORIDE 5 MG/1
5 TABLET ORAL ONCE
Refills: 0 | Status: DISCONTINUED | OUTPATIENT
Start: 2019-12-26 | End: 2019-12-26

## 2019-12-26 RX ADMIN — Medication 5 MILLILITER(S): at 13:01

## 2019-12-26 RX ADMIN — OXYCODONE HYDROCHLORIDE 5 MILLIGRAM(S): 5 TABLET ORAL at 11:24

## 2019-12-26 NOTE — PROCEDURE NOTE - ADDITIONAL PROCEDURE DETAILS
POCUS: Emergency Department Focused Ultrasound performed at patient's bedside.  The complete report will be available in PACS.  Right anterior medial showed joint effusion, follow-up with xray and correlate clinically

## 2019-12-26 NOTE — ED PROVIDER NOTE - OBJECTIVE STATEMENT
Pt reports 1 week of significantly worsening R knee pain. No falls. Works as a , carries a lot, uses stairs frequently. Went to UC 2 days ago and told to come to ED if not improving. Notes pain is getting much worse and today was unable to get up the stairs. Took tylenol without relief. No fevers, chills. No prior history of same.    H/o HTN, HLD, DM2, CAD s/p AARON, on Plavix, ASA, Metopr, Clonidine, Hydral, Enalapril, Protonix, Statin, Gabapentin

## 2019-12-26 NOTE — ED PROVIDER NOTE - PHYSICAL EXAMINATION
R knee with significant swelling, worse to medial superior edge. +Tenderness to palpation, worse alone medial joint line. Significant decreased range of motion due to pain, unable to flex more than 15degress. +Warmth. No overlying erythema. No rashes. Noted area of excoriation inferior to knee overlying tibial tuberosity. FROM ankle and hip without pain. 2+ DP pulses. Cap refill distally < 3 seconds

## 2019-12-26 NOTE — ED PROCEDURE NOTE - ATTENDING CONTRIBUTION TO CARE
Attending MD April Hitchcock: Risks, benefit and alternatives of procedure explained to patient and patient demonstrated verbal understanding and consent.  I was present during the key portions of the procedure. Patient tolerated procedure well without complications

## 2019-12-26 NOTE — ED PROVIDER NOTE - CLINICAL SUMMARY MEDICAL DECISION MAKING FREE TEXT BOX
April Hitchcock MD - Attending Physician: Pt here with significant R knee pain and swelling. Unable to ambulate due to pain. Likely MSK injury; lower risk gout, possible hemarthrosis due to plavix/asa. Xrays, pain control, re-eval

## 2019-12-26 NOTE — ED PROVIDER NOTE - NSFOLLOWUPINSTRUCTIONS_ED_ALL_ED_FT
Your diagnosis: Knee Pain    Discharge instructions:    - Please follow up with your Primary Care Doctor.    - Tylenol up to 650 mg every 8 hours as needed for pain.     - Be sure to return to the ED if you develop new or worsening symptoms. Specific signs and symptoms to be vigilant of: fever or chills, chest pain, difficulty breathing, palpitations, loss of consciousness, headache, vision changes, slurred speech, difficulty swallowing or drooling, facial droop, weakness in the arms or legs, numbness or tingling, abdominal pain, nausea or vomiting, diarrhea, constipation, blood in the stool or urine, pain on urination, difficulty urinating or any other distressing symptoms.

## 2019-12-26 NOTE — ED PROVIDER NOTE - PATIENT PORTAL LINK FT
You can access the FollowMyHealth Patient Portal offered by [facility] by registering at the following website: [email or web address]. By joining [employer]’s FollowMyHealth portal, you will also be able to view your health information using other applications (apps) compatible with our system.

## 2019-12-26 NOTE — ED ADULT NURSE NOTE - OBJECTIVE STATEMENT
69 year old female a/ox3 ambulatory presenting to ed with right knee pain and swelling x 3-4 days. patient was seen in urgent care 2 days ago and now pain and swelling becoming worse. 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 knee noted with increased swelling and warm to touch. minimal rom secondary to pain.

## 2019-12-26 NOTE — ED PROVIDER NOTE - ATTENDING CONTRIBUTION TO CARE
April Hitchcock MD - Attending Physician: I have personally seen and examined this patient with the resident/fellow.  I have fully participated in the care of this patient. I have reviewed all pertinent clinical information, including history, physical exam, plan and the Resident/Fellow’s note and agree except as noted. See MDM

## 2019-12-26 NOTE — ED PROVIDER NOTE - PROGRESS NOTE DETAILS
Xr without fracture. Significant joint effusion on exam. +Warmth. Will Tap to r/o gout, etc. No overlying cellulitis 30cc of yellow synovial fluid obtained - no hemarthrosis. Sent to lab for analysis

## 2019-12-26 NOTE — ED ADULT NURSE NOTE - NSIMPLEMENTINTERV_GEN_ALL_ED
Implemented All Universal Safety Interventions:  [place] to call system. Call bell, personal items and telephone within reach. Instruct patient to call for assistance. Room bathroom lighting operational. Non-slip footwear when patient is off stretcher. Physically safe environment: no spills, clutter or unnecessary equipment. Stretcher in lowest position, wheels locked, appropriate side rails in place.

## 2020-01-09 LAB
CULTURE RESULTS: SIGNIFICANT CHANGE UP
SPECIMEN SOURCE: SIGNIFICANT CHANGE UP

## 2020-01-15 PROBLEM — I25.10 ATHEROSCLEROTIC HEART DISEASE OF NATIVE CORONARY ARTERY WITHOUT ANGINA PECTORIS: Chronic | Status: ACTIVE | Noted: 2019-12-26

## 2020-02-04 ENCOUNTER — RESULT CHARGE (OUTPATIENT)
Age: 70
End: 2020-02-04

## 2020-02-04 ENCOUNTER — APPOINTMENT (OUTPATIENT)
Dept: INTERNAL MEDICINE | Facility: CLINIC | Age: 70
End: 2020-02-04
Payer: MEDICARE

## 2020-02-04 VITALS
WEIGHT: 130 LBS | SYSTOLIC BLOOD PRESSURE: 140 MMHG | BODY MASS INDEX: 25.39 KG/M2 | HEART RATE: 54 BPM | OXYGEN SATURATION: 97 % | DIASTOLIC BLOOD PRESSURE: 70 MMHG

## 2020-02-04 DIAGNOSIS — M17.10 UNILATERAL PRIMARY OSTEOARTHRITIS, UNSPECIFIED KNEE: ICD-10-CM

## 2020-02-04 LAB
25(OH)D3 SERPL-MCNC: 31.9 NG/ML
ALBUMIN SERPL ELPH-MCNC: 5.2 G/DL
ALP BLD-CCNC: 68 U/L
ALT SERPL-CCNC: 14 U/L
ANION GAP SERPL CALC-SCNC: 16 MMOL/L
AST SERPL-CCNC: 19 U/L
BASOPHILS # BLD AUTO: 0.05 K/UL
BASOPHILS NFR BLD AUTO: 0.6 %
BILIRUB SERPL-MCNC: 0.3 MG/DL
BUN SERPL-MCNC: 5 MG/DL
CALCIUM SERPL-MCNC: 10.3 MG/DL
CHLORIDE SERPL-SCNC: 98 MMOL/L
CHOLEST SERPL-MCNC: 164 MG/DL
CHOLEST/HDLC SERPL: 2.9 RATIO
CO2 SERPL-SCNC: 24 MMOL/L
CREAT SERPL-MCNC: 0.39 MG/DL
CREAT SPEC-SCNC: 8 MG/DL
EOSINOPHIL # BLD AUTO: 0.2 K/UL
EOSINOPHIL NFR BLD AUTO: 2.3 %
ESTIMATED AVERAGE GLUCOSE: 148 MG/DL
GLUCOSE BLDC GLUCOMTR-MCNC: 82
GLUCOSE SERPL-MCNC: 75 MG/DL
HBA1C MFR BLD HPLC: 6.7
HBA1C MFR BLD HPLC: 6.8 %
HCT VFR BLD CALC: 44.3 %
HDLC SERPL-MCNC: 57 MG/DL
HGB BLD-MCNC: 13.8 G/DL
IMM GRANULOCYTES NFR BLD AUTO: 0.2 %
LDLC SERPL CALC-MCNC: 50 MG/DL
LYMPHOCYTES # BLD AUTO: 3.26 K/UL
LYMPHOCYTES NFR BLD AUTO: 38.1 %
MAN DIFF?: NORMAL
MCHC RBC-ENTMCNC: 27.7 PG
MCHC RBC-ENTMCNC: 31.2 GM/DL
MCV RBC AUTO: 88.8 FL
MICROALBUMIN 24H UR DL<=1MG/L-MCNC: <1.2 MG/DL
MICROALBUMIN/CREAT 24H UR-RTO: NORMAL MG/G
MONOCYTES # BLD AUTO: 0.53 K/UL
MONOCYTES NFR BLD AUTO: 6.2 %
NEUTROPHILS # BLD AUTO: 4.49 K/UL
NEUTROPHILS NFR BLD AUTO: 52.6 %
PLATELET # BLD AUTO: 276 K/UL
POTASSIUM SERPL-SCNC: 4.3 MMOL/L
PROT SERPL-MCNC: 8.5 G/DL
RBC # BLD: 4.99 M/UL
RBC # FLD: 13 %
SODIUM SERPL-SCNC: 138 MMOL/L
T4 FREE SERPL-MCNC: 1.4 NG/DL
TRIGL SERPL-MCNC: 286 MG/DL
TSH SERPL-ACNC: 0.85 UIU/ML
WBC # FLD AUTO: 8.55 K/UL

## 2020-02-04 PROCEDURE — 83036 HEMOGLOBIN GLYCOSYLATED A1C: CPT | Mod: QW

## 2020-02-04 PROCEDURE — 82962 GLUCOSE BLOOD TEST: CPT

## 2020-02-04 PROCEDURE — 99214 OFFICE O/P EST MOD 30 MIN: CPT

## 2020-02-04 NOTE — HISTORY OF PRESENT ILLNESS
[de-identified] : 69-year-old female with a history of diabetes, hypertension and hyperlipidemia who comes in today with complaints of right knee pain.  Was so bad on December 26 she was not unable to walk on her leg and went to the emergency department.  X-rays were done which revealed arthritis and a fairly large calcific body.  Arthrocentesis was done and patient was sent home.  No infection was noted.  She is continued to have pain in the right knee and now the pain is radiating up toward the inside of her thigh.  I reviewed her results and the calcific density is in the approximate place where her pain has been in the medial joint line.  The knee remains swollen and tender to touch although she states it is improved.\par \par In terms of her diabetes and hypertension she has had no problems with her medications.  Denies any lightheadedness dizziness polyuria or polydipsia.

## 2020-02-04 NOTE — ASSESSMENT
[FreeTextEntry1] : 1.  Arthritis of right knee with calcific density.  Question etiology.  Needs Ortho evaluation and MRI.  MRI was ordered in anticipation of the needs of the orthopedist.  Further management pending the results.  Check a urine microalbumin\par 2.  Diabetes well controlled at this time.  Reminded of need for eye exam.  Foot care reiterated.\par 3.  Hypertension with BP stable at this time.  Continue current management.\par 4.  Hyperlipidemia with last LDL at goal.  She has not had one done in some time and will check a lipid profile today.\par 5.  Follow-up in 3 to 4 months or as needed

## 2020-04-24 ENCOUNTER — RX RENEWAL (OUTPATIENT)
Age: 70
End: 2020-04-24

## 2020-04-26 ENCOUNTER — RX RENEWAL (OUTPATIENT)
Age: 70
End: 2020-04-26

## 2020-07-16 ENCOUNTER — NON-APPOINTMENT (OUTPATIENT)
Age: 70
End: 2020-07-16

## 2020-07-16 ENCOUNTER — APPOINTMENT (OUTPATIENT)
Dept: CARDIOLOGY | Facility: CLINIC | Age: 70
End: 2020-07-16
Payer: MEDICARE

## 2020-07-16 VITALS — DIASTOLIC BLOOD PRESSURE: 75 MMHG | OXYGEN SATURATION: 97 % | SYSTOLIC BLOOD PRESSURE: 150 MMHG | HEART RATE: 64 BPM

## 2020-07-16 PROCEDURE — 99213 OFFICE O/P EST LOW 20 MIN: CPT

## 2020-07-16 PROCEDURE — 93000 ELECTROCARDIOGRAM COMPLETE: CPT

## 2020-07-16 NOTE — PHYSICAL EXAM
[General Appearance - Well Developed] : well developed [Normal Appearance] : normal appearance [Well Groomed] : well groomed [General Appearance - Well Nourished] : well nourished [No Deformities] : no deformities [General Appearance - In No Acute Distress] : no acute distress [Normal Conjunctiva] : the conjunctiva exhibited no abnormalities [Eyelids - No Xanthelasma] : the eyelids demonstrated no xanthelasmas [Normal Oral Mucosa] : normal oral mucosa [No Oral Pallor] : no oral pallor [No Oral Cyanosis] : no oral cyanosis [Normal Jugular Venous A Waves Present] : normal jugular venous A waves present [Normal Jugular Venous V Waves Present] : normal jugular venous V waves present [No Jugular Venous Roberts A Waves] : no jugular venous roberts A waves [Exaggerated Use Of Accessory Muscles For Inspiration] : no accessory muscle use [Respiration, Rhythm And Depth] : normal respiratory rhythm and effort [Auscultation Breath Sounds / Voice Sounds] : lungs were clear to auscultation bilaterally [Heart Sounds] : normal S1 and S2 [Heart Rate And Rhythm] : heart rate and rhythm were normal [Murmurs] : no murmurs present [Edema] : no peripheral edema present [Arterial Pulses Normal] : the arterial pulses were normal [Abdomen Soft] : soft [Abdomen Tenderness] : non-tender [Abdomen Mass (___ Cm)] : no abdominal mass palpated [Gait - Sufficient For Exercise Testing] : the gait was sufficient for exercise testing [Abnormal Walk] : normal gait [Nail Clubbing] : no clubbing of the fingernails [Cyanosis, Localized] : no localized cyanosis [Petechial Hemorrhages (___cm)] : no petechial hemorrhages [] : no rash [Skin Color & Pigmentation] : normal skin color and pigmentation [No Venous Stasis] : no venous stasis [No Skin Ulcers] : no skin ulcer [Skin Lesions] : no skin lesions [No Xanthoma] : no  xanthoma was observed [Oriented To Time, Place, And Person] : oriented to person, place, and time [Affect] : the affect was normal [Mood] : the mood was normal [No Anxiety] : not feeling anxious

## 2020-07-18 NOTE — DISCUSSION/SUMMARY
[FreeTextEntry1] : s/p RPDA AARON\par \par Feels well\par No angina\par No HF\par BP acceptable\par May stop plavix and c/w ASA\par \par RTO 4-6 mo\par

## 2020-07-18 NOTE — HISTORY OF PRESENT ILLNESS
[FreeTextEntry1] : Prior PCI to RPDA for dyspnea, abn cardiac CT\par RTO for f/u\par \par Feels well\par No complaints\par No CP\par No LE edema\par No palpitations\par

## 2020-07-18 NOTE — REASON FOR VISIT
[Coronary Artery Disease] : coronary artery disease [Hyperlipidemia] : hyperlipidemia [Hypertension] : hypertension [Medication Management] : Medication management [Follow-Up - Clinic] : a clinic follow-up of

## 2020-07-28 ENCOUNTER — OUTPATIENT (OUTPATIENT)
Dept: OUTPATIENT SERVICES | Facility: HOSPITAL | Age: 70
LOS: 1 days | End: 2020-07-28
Payer: MEDICARE

## 2020-07-28 ENCOUNTER — RESULT REVIEW (OUTPATIENT)
Age: 70
End: 2020-07-28

## 2020-07-28 ENCOUNTER — APPOINTMENT (OUTPATIENT)
Dept: ULTRASOUND IMAGING | Facility: HOSPITAL | Age: 70
End: 2020-07-28
Payer: MEDICARE

## 2020-07-28 DIAGNOSIS — Z00.00 ENCOUNTER FOR GENERAL ADULT MEDICAL EXAMINATION WITHOUT ABNORMAL FINDINGS: ICD-10-CM

## 2020-07-28 DIAGNOSIS — I25.10 ATHEROSCLEROTIC HEART DISEASE OF NATIVE CORONARY ARTERY WITHOUT ANGINA PECTORIS: ICD-10-CM

## 2020-07-28 DIAGNOSIS — E11.9 TYPE 2 DIABETES MELLITUS WITHOUT COMPLICATIONS: ICD-10-CM

## 2020-07-28 PROCEDURE — 93923 UPR/LXTR ART STDY 3+ LVLS: CPT | Mod: 26

## 2020-07-28 PROCEDURE — 93923 UPR/LXTR ART STDY 3+ LVLS: CPT

## 2020-10-13 ENCOUNTER — APPOINTMENT (OUTPATIENT)
Dept: INTERNAL MEDICINE | Facility: CLINIC | Age: 70
End: 2020-10-13
Payer: MEDICARE

## 2020-10-13 VITALS
HEART RATE: 59 BPM | WEIGHT: 137 LBS | SYSTOLIC BLOOD PRESSURE: 132 MMHG | OXYGEN SATURATION: 99 % | BODY MASS INDEX: 26.9 KG/M2 | HEIGHT: 60 IN | DIASTOLIC BLOOD PRESSURE: 70 MMHG

## 2020-10-13 DIAGNOSIS — Z12.11 ENCOUNTER FOR SCREENING FOR MALIGNANT NEOPLASM OF COLON: ICD-10-CM

## 2020-10-13 DIAGNOSIS — Z12.12 ENCOUNTER FOR SCREENING FOR MALIGNANT NEOPLASM OF COLON: ICD-10-CM

## 2020-10-13 LAB
GLUCOSE BLDC GLUCOMTR-MCNC: 136
HBA1C MFR BLD HPLC: 7.1

## 2020-10-13 PROCEDURE — G0439: CPT

## 2020-10-13 PROCEDURE — 83036 HEMOGLOBIN GLYCOSYLATED A1C: CPT | Mod: QW

## 2020-10-13 PROCEDURE — 82962 GLUCOSE BLOOD TEST: CPT

## 2020-10-13 PROCEDURE — 90662 IIV NO PRSV INCREASED AG IM: CPT

## 2020-10-13 PROCEDURE — G0008: CPT

## 2020-10-14 LAB
ALBUMIN SERPL ELPH-MCNC: 4.6 G/DL
ALP BLD-CCNC: 68 U/L
ALT SERPL-CCNC: 12 U/L
ANION GAP SERPL CALC-SCNC: 12 MMOL/L
AST SERPL-CCNC: 16 U/L
BILIRUB SERPL-MCNC: 0.4 MG/DL
BUN SERPL-MCNC: 10 MG/DL
CALCIUM SERPL-MCNC: 9.7 MG/DL
CHLORIDE SERPL-SCNC: 97 MMOL/L
CHOLEST SERPL-MCNC: 162 MG/DL
CHOLEST/HDLC SERPL: 2.8 RATIO
CO2 SERPL-SCNC: 26 MMOL/L
CREAT SERPL-MCNC: 0.47 MG/DL
GLUCOSE SERPL-MCNC: 106 MG/DL
HDLC SERPL-MCNC: 58 MG/DL
LDLC SERPL CALC-MCNC: 77 MG/DL
POTASSIUM SERPL-SCNC: 4.9 MMOL/L
PROT SERPL-MCNC: 7.2 G/DL
SODIUM SERPL-SCNC: 136 MMOL/L
TRIGL SERPL-MCNC: 139 MG/DL

## 2021-01-21 ENCOUNTER — NON-APPOINTMENT (OUTPATIENT)
Age: 71
End: 2021-01-21

## 2021-01-21 ENCOUNTER — APPOINTMENT (OUTPATIENT)
Dept: CARDIOLOGY | Facility: CLINIC | Age: 71
End: 2021-01-21
Payer: MEDICARE

## 2021-01-21 VITALS — SYSTOLIC BLOOD PRESSURE: 160 MMHG | OXYGEN SATURATION: 99 % | DIASTOLIC BLOOD PRESSURE: 76 MMHG | HEART RATE: 58 BPM

## 2021-01-21 PROCEDURE — 99213 OFFICE O/P EST LOW 20 MIN: CPT

## 2021-01-21 PROCEDURE — 99072 ADDL SUPL MATRL&STAF TM PHE: CPT

## 2021-01-21 PROCEDURE — 93000 ELECTROCARDIOGRAM COMPLETE: CPT

## 2021-01-21 NOTE — DISCUSSION/SUMMARY
[FreeTextEntry1] : s/p RPDA AARON\par \par Feels well\par No angina\par No HF\par BP elevated (first time this elevation is noted. will monitor for now and if elevated on subsequent visit would titrate med)\par \par  c/w ASA\par \par RTO 4-6 mo\par

## 2021-01-21 NOTE — PHYSICAL EXAM
[General Appearance - Well Developed] : well developed [Normal Appearance] : normal appearance [Well Groomed] : well groomed [General Appearance - Well Nourished] : well nourished [No Deformities] : no deformities [General Appearance - In No Acute Distress] : no acute distress [Normal Conjunctiva] : the conjunctiva exhibited no abnormalities [Eyelids - No Xanthelasma] : the eyelids demonstrated no xanthelasmas [Normal Oral Mucosa] : normal oral mucosa [No Oral Pallor] : no oral pallor [No Oral Cyanosis] : no oral cyanosis [Normal Jugular Venous A Waves Present] : normal jugular venous A waves present [Normal Jugular Venous V Waves Present] : normal jugular venous V waves present [No Jugular Venous Roberts A Waves] : no jugular venous roberts A waves [Respiration, Rhythm And Depth] : normal respiratory rhythm and effort [Exaggerated Use Of Accessory Muscles For Inspiration] : no accessory muscle use [Auscultation Breath Sounds / Voice Sounds] : lungs were clear to auscultation bilaterally [Heart Rate And Rhythm] : heart rate and rhythm were normal [Heart Sounds] : normal S1 and S2 [Murmurs] : no murmurs present [Arterial Pulses Normal] : the arterial pulses were normal [Edema] : no peripheral edema present [Abdomen Soft] : soft [Abdomen Tenderness] : non-tender [Abdomen Mass (___ Cm)] : no abdominal mass palpated [Abnormal Walk] : normal gait [Gait - Sufficient For Exercise Testing] : the gait was sufficient for exercise testing [Nail Clubbing] : no clubbing of the fingernails [Cyanosis, Localized] : no localized cyanosis [Petechial Hemorrhages (___cm)] : no petechial hemorrhages [Skin Color & Pigmentation] : normal skin color and pigmentation [] : no rash [No Venous Stasis] : no venous stasis [No Skin Ulcers] : no skin ulcer [Skin Lesions] : no skin lesions [No Xanthoma] : no  xanthoma was observed [Oriented To Time, Place, And Person] : oriented to person, place, and time [Affect] : the affect was normal [Mood] : the mood was normal [No Anxiety] : not feeling anxious

## 2021-01-21 NOTE — HISTORY OF PRESENT ILLNESS
[FreeTextEntry1] : Prior PCI to RPDA for dyspnea, abn cardiac CT\par RTO for f/u\par \par Feels well\par No complaints\par No CP\par No LE edema\par No palpitations\par Most recent labs reviewed

## 2021-03-16 ENCOUNTER — RESULT REVIEW (OUTPATIENT)
Age: 71
End: 2021-03-16

## 2021-03-16 ENCOUNTER — OUTPATIENT (OUTPATIENT)
Dept: OUTPATIENT SERVICES | Facility: HOSPITAL | Age: 71
LOS: 1 days | End: 2021-03-16
Payer: MEDICARE

## 2021-03-16 ENCOUNTER — APPOINTMENT (OUTPATIENT)
Dept: MAMMOGRAPHY | Facility: IMAGING CENTER | Age: 71
End: 2021-03-16
Payer: MEDICARE

## 2021-03-16 DIAGNOSIS — Z00.00 ENCOUNTER FOR GENERAL ADULT MEDICAL EXAMINATION WITHOUT ABNORMAL FINDINGS: ICD-10-CM

## 2021-03-16 PROCEDURE — 77063 BREAST TOMOSYNTHESIS BI: CPT

## 2021-03-16 PROCEDURE — 77063 BREAST TOMOSYNTHESIS BI: CPT | Mod: 26

## 2021-03-16 PROCEDURE — 77067 SCR MAMMO BI INCL CAD: CPT

## 2021-03-16 PROCEDURE — 77067 SCR MAMMO BI INCL CAD: CPT | Mod: 26

## 2021-05-10 ENCOUNTER — NON-APPOINTMENT (OUTPATIENT)
Age: 71
End: 2021-05-10

## 2021-05-24 ENCOUNTER — NON-APPOINTMENT (OUTPATIENT)
Age: 71
End: 2021-05-24

## 2021-05-25 ENCOUNTER — RX RENEWAL (OUTPATIENT)
Age: 71
End: 2021-05-25

## 2021-05-25 ENCOUNTER — APPOINTMENT (OUTPATIENT)
Dept: INTERNAL MEDICINE | Facility: CLINIC | Age: 71
End: 2021-05-25
Payer: MEDICARE

## 2021-05-25 DIAGNOSIS — R10.13 EPIGASTRIC PAIN: ICD-10-CM

## 2021-05-25 LAB
GLUCOSE BLDC GLUCOMTR-MCNC: 113
HBA1C MFR BLD HPLC: 7.5

## 2021-05-25 PROCEDURE — 82962 GLUCOSE BLOOD TEST: CPT

## 2021-05-25 PROCEDURE — 83036 HEMOGLOBIN GLYCOSYLATED A1C: CPT | Mod: QW

## 2021-05-25 PROCEDURE — 99072 ADDL SUPL MATRL&STAF TM PHE: CPT

## 2021-05-25 PROCEDURE — 99214 OFFICE O/P EST MOD 30 MIN: CPT

## 2021-05-25 RX ORDER — FAMOTIDINE 20 MG/1
20 TABLET, FILM COATED ORAL
Qty: 60 | Refills: 2 | Status: DISCONTINUED | COMMUNITY
Start: 2019-01-29 | End: 2021-05-25

## 2021-05-25 RX ORDER — KRILL/OM-3/DHA/EPA/PHOSPHO/AST 1000-230MG
CAPSULE ORAL
Refills: 0 | Status: ACTIVE | COMMUNITY

## 2021-05-25 NOTE — REVIEW OF SYSTEMS
[Abdominal Pain] : abdominal pain [Fever] : no fever [Chills] : no chills [Fatigue] : no fatigue [Discharge] : no discharge [Dysuria] : no dysuria [Joint Pain] : no joint pain [Itching] : no itching [Headache] : no headache [FreeTextEntry7] : see HPI, epigastric and lower abdomen area floating pain on/off [de-identified] : vetigo improved

## 2021-05-25 NOTE — PHYSICAL EXAM
[No Acute Distress] : no acute distress [Normal Sclera/Conjunctiva] : normal sclera/conjunctiva [Normal Outer Ear/Nose] : the outer ears and nose were normal in appearance [Normal Oropharynx] : the oropharynx was normal [Normal Rate] : normal rate  [Regular Rhythm] : with a regular rhythm [Normal S1, S2] : normal S1 and S2 [No Carotid Bruits] : no carotid bruits [No Abdominal Bruit] : a ~M bruit was not heard ~T in the abdomen [Declined Breast Exam] : declined breast exam  [Soft] : abdomen soft [Non-distended] : non-distended [Normal Bowel Sounds] : normal bowel sounds [Normal] : no CVA or spinal tenderness [No Joint Swelling] : no joint swelling [No Rash] : no rash [Coordination Grossly Intact] : coordination grossly intact [Normal Mood] : the mood was normal [de-identified] : mild tenderness on palpation on lower mid abdomen 3/10, no bulging

## 2021-05-25 NOTE — HISTORY OF PRESENT ILLNESS
[FreeTextEntry1] : f/u after hospitalization to [facility] on 4/29/21 for 2days. [de-identified] : JUAN R ULLOA  is a 70 year old F  who presents with hx HTN, CAD with PTCA, Stent insertion, DM and HLD. \par No hospital information we have today except discharge paper. 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 Admitted for 2 days and got evaluated. To F/u Dr. Arceo, cardiologist in July 2021. Currently no CP, SOB. Taking Plavix 75mg po qd and ASA 81mg po qd. No melen. no BRBPR, eating and moving bowel is okay.\par \par Still has abdominal pain 3/10 , worse time 10/10 2days ago. floating abdominal pain from epigastric area to lower abdomen. Couldn't make appointment for GI due to her insurance issue. Pt made appt with Dr. Burton general surgery on 6/7/21.\par Vertigo got better with Meclizine 25mg tid prn. \par She c/o b/l ear's heavy feeling seen by ENT  Dr. Monson from [facility]: no specific finding? no medication recommended. \par She wants Dr. Kitchen to guide her for the proper specialist for her GI follow up.\par \par COVID vaccinated with Pfizer: 2/24/21, 3/24/21.\par Flu vaccinated 11/2020.\par \par \par \par \par \par

## 2021-05-25 NOTE — ASSESSMENT
[FreeTextEntry1] : JUAN R ULLOA  is a 70 year old F  who presents with hx HTN, CAD with PTCA, Stent insertion, DM and HLD came today after hospitalization at [facility] on 4/29/21. She should contact [facility] for release of medical information since we have only discharge paper that pt received. \par \par # epigastric pain\par occasionally got worse, 2 days ago 10/10 floating abdominal pain with increased gas pain, belching. today pain 3/10.\par Reported normal bowel movement with regular diet intake with DM. \par Referred to [facility] due to hardship with her medical insurance. \par She stated that she made appointment with Dr. Fontenot , general surgery on 6/7/21.\par She will make appt with vascular MD by discharge paper. \par \par # HTN/ CAD\par on [id number] po qd and ASA 81mg po qd\par on Enalapril 20mg bid, Hydralazine 50mg bid, metoprolol 50mg bid. \par 130/80 today.\par Hospital discharge note referred her with GI, General surgery, vascular MDs.\par \par # DM\par Continue with Metformin 500mg 2 tab po bid\par POCT glucose: 113, A1C 7.5\par Encouraged low sugar diet and regular meal time. \par \par #HLD\par on simvastatin 40mg po qd\par \par -?aorthic thrombus from home care services but patient is unaware.  Will attempt to get records from [facility].\par \par -lab as planned\par -f/u 6month later or prn

## 2021-05-25 NOTE — END OF VISIT
[FreeTextEntry3] : Pt seen and examined in conjunction with Holly Bernal NP acting as scribe as well.  I agree with the history and physical as documented.

## 2021-06-28 DIAGNOSIS — D12.6 BENIGN NEOPLASM OF COLON, UNSPECIFIED: ICD-10-CM

## 2021-09-02 ENCOUNTER — NON-APPOINTMENT (OUTPATIENT)
Age: 71
End: 2021-09-02

## 2021-09-02 ENCOUNTER — APPOINTMENT (OUTPATIENT)
Dept: CARDIOLOGY | Facility: CLINIC | Age: 71
End: 2021-09-02
Payer: MEDICARE

## 2021-09-02 VITALS
HEIGHT: 60 IN | BODY MASS INDEX: 26.7 KG/M2 | SYSTOLIC BLOOD PRESSURE: 147 MMHG | DIASTOLIC BLOOD PRESSURE: 80 MMHG | HEART RATE: 54 BPM | WEIGHT: 136 LBS | OXYGEN SATURATION: 99 %

## 2021-09-02 DIAGNOSIS — Z95.5 PRESENCE OF CORONARY ANGIOPLASTY IMPLANT AND GRAFT: ICD-10-CM

## 2021-09-02 PROCEDURE — 99214 OFFICE O/P EST MOD 30 MIN: CPT

## 2021-09-02 PROCEDURE — 93000 ELECTROCARDIOGRAM COMPLETE: CPT

## 2021-09-02 NOTE — HISTORY OF PRESENT ILLNESS
[FreeTextEntry1] : Prior PCI to RPDA for dyspnea, abn cardiac CT\par RTO for f/u\par \par Feels well\par Mostly complains of lower abd pain - seeing GI\par No CP\par No LE edema\par No palpitations

## 2021-09-02 NOTE — DISCUSSION/SUMMARY
[FreeTextEntry1] : s/p RPDA AARON\par \par  \par No angina\par No HF \par \par  c/w ASA\par \par RTO 4-6 mo\par

## 2021-09-02 NOTE — PHYSICAL EXAM
From: Armin Landeros  To: Isela Amato DO  Sent: 10/3/2019 9:17 AM CDT  Subject: Prescription Question    Hello.  Still waiting to hear back from the nurse about which over the counter medication I should take since neither of the prescriptions you sen [Well Developed] : well developed [Well Nourished] : well nourished [No Acute Distress] : no acute distress [Normal Conjunctiva] : normal conjunctiva [Normal Venous Pressure] : normal venous pressure [No Carotid Bruit] : no carotid bruit [Normal S1, S2] : normal S1, S2 [No Murmur] : no murmur [No Rub] : no rub [No Gallop] : no gallop [Clear Lung Fields] : clear lung fields [Good Air Entry] : good air entry [No Respiratory Distress] : no respiratory distress  [Soft] : abdomen soft [Non Tender] : non-tender [No Masses/organomegaly] : no masses/organomegaly [Normal Bowel Sounds] : normal bowel sounds [Normal Gait] : normal gait [No Edema] : no edema [No Cyanosis] : no cyanosis [No Clubbing] : no clubbing [No Varicosities] : no varicosities [No Rash] : no rash [No Skin Lesions] : no skin lesions [Moves all extremities] : moves all extremities [No Focal Deficits] : no focal deficits [Normal Speech] : normal speech [Alert and Oriented] : alert and oriented [Normal memory] : normal memory

## 2021-09-21 ENCOUNTER — APPOINTMENT (OUTPATIENT)
Dept: OPHTHALMOLOGY | Facility: CLINIC | Age: 71
End: 2021-09-21
Payer: MEDICARE

## 2021-09-21 ENCOUNTER — NON-APPOINTMENT (OUTPATIENT)
Age: 71
End: 2021-09-21

## 2021-09-21 PROCEDURE — 92014 COMPRE OPH EXAM EST PT 1/>: CPT

## 2021-09-21 PROCEDURE — 92134 CPTRZ OPH DX IMG PST SGM RTA: CPT

## 2021-11-22 ENCOUNTER — APPOINTMENT (OUTPATIENT)
Dept: OPHTHALMOLOGY | Facility: CLINIC | Age: 71
End: 2021-11-22

## 2021-12-26 ENCOUNTER — RX RENEWAL (OUTPATIENT)
Age: 71
End: 2021-12-26

## 2022-01-19 ENCOUNTER — RX RENEWAL (OUTPATIENT)
Age: 72
End: 2022-01-19

## 2022-02-01 ENCOUNTER — RX RENEWAL (OUTPATIENT)
Age: 72
End: 2022-02-01

## 2022-02-05 ENCOUNTER — RX RENEWAL (OUTPATIENT)
Age: 72
End: 2022-02-05

## 2022-02-17 ENCOUNTER — RX RENEWAL (OUTPATIENT)
Age: 72
End: 2022-02-17

## 2022-02-18 ENCOUNTER — NON-APPOINTMENT (OUTPATIENT)
Age: 72
End: 2022-02-18

## 2022-02-22 ENCOUNTER — APPOINTMENT (OUTPATIENT)
Dept: INTERNAL MEDICINE | Facility: CLINIC | Age: 72
End: 2022-02-22
Payer: MEDICARE

## 2022-02-22 VITALS
BODY MASS INDEX: 26.5 KG/M2 | HEIGHT: 60 IN | OXYGEN SATURATION: 98 % | DIASTOLIC BLOOD PRESSURE: 78 MMHG | RESPIRATION RATE: 14 BRPM | SYSTOLIC BLOOD PRESSURE: 140 MMHG | WEIGHT: 135 LBS | HEART RATE: 63 BPM

## 2022-02-22 DIAGNOSIS — Z86.19 PERSONAL HISTORY OF OTHER INFECTIOUS AND PARASITIC DISEASES: ICD-10-CM

## 2022-02-22 DIAGNOSIS — R20.8 OTHER DISTURBANCES OF SKIN SENSATION: ICD-10-CM

## 2022-02-22 DIAGNOSIS — R29.898 OTHER SYMPTOMS AND SIGNS INVOLVING THE MUSCULOSKELETAL SYSTEM: ICD-10-CM

## 2022-02-22 DIAGNOSIS — A04.8 OTHER SPECIFIED BACTERIAL INTESTINAL INFECTIONS: ICD-10-CM

## 2022-02-22 DIAGNOSIS — Z13.820 ENCOUNTER FOR SCREENING FOR OSTEOPOROSIS: ICD-10-CM

## 2022-02-22 DIAGNOSIS — D64.9 ANEMIA, UNSPECIFIED: ICD-10-CM

## 2022-02-22 DIAGNOSIS — K62.5 HEMORRHAGE OF ANUS AND RECTUM: ICD-10-CM

## 2022-02-22 DIAGNOSIS — M25.519 PAIN IN UNSPECIFIED SHOULDER: ICD-10-CM

## 2022-02-22 DIAGNOSIS — Z87.898 PERSONAL HISTORY OF OTHER SPECIFIED CONDITIONS: ICD-10-CM

## 2022-02-22 DIAGNOSIS — Z92.29 PERSONAL HISTORY OF OTHER DRUG THERAPY: ICD-10-CM

## 2022-02-22 DIAGNOSIS — R20.0 ANESTHESIA OF SKIN: ICD-10-CM

## 2022-02-22 DIAGNOSIS — M67.912 UNSPECIFIED DISORDER OF SYNOVIUM AND TENDON, LEFT SHOULDER: ICD-10-CM
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
## 2022-02-22 DIAGNOSIS — Z00.00 ENCOUNTER FOR GENERAL ADULT MEDICAL EXAMINATION W/OUT ABNORMAL FINDINGS: ICD-10-CM

## 2022-02-22 DIAGNOSIS — Z13.31 ENCOUNTER FOR SCREENING FOR DEPRESSION: ICD-10-CM

## 2022-02-22 DIAGNOSIS — T23.001A BURN OF UNSPECIFIED DEGREE OF RIGHT HAND, UNSPECIFIED SITE, INITIAL ENCOUNTER: ICD-10-CM

## 2022-02-22 DIAGNOSIS — R10.9 UNSPECIFIED ABDOMINAL PAIN: ICD-10-CM

## 2022-02-22 DIAGNOSIS — I73.9 PERIPHERAL VASCULAR DISEASE, UNSPECIFIED: ICD-10-CM

## 2022-02-22 LAB — HBA1C MFR BLD HPLC: 7.2

## 2022-02-22 PROCEDURE — G0444 DEPRESSION SCREEN ANNUAL: CPT

## 2022-02-22 PROCEDURE — 83036 HEMOGLOBIN GLYCOSYLATED A1C: CPT | Mod: QW

## 2022-02-22 PROCEDURE — G0439: CPT

## 2022-04-16 PROBLEM — M67.912 DISORDER OF LEFT ROTATOR CUFF: Status: RESOLVED | Noted: 2018-07-24 | Resolved: 2022-04-16
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
## 2022-04-16 PROBLEM — Z87.898 HISTORY OF DYSURIA: Status: RESOLVED | Noted: 2017-04-25 | Resolved: 2022-04-16

## 2022-04-16 PROBLEM — I73.9 CLAUDICATION, INTERMITTENT: Status: ACTIVE | Noted: 2022-02-22

## 2022-04-16 PROBLEM — T23.001A BURN OF HAND, RIGHT: Status: RESOLVED | Noted: 2018-03-12 | Resolved: 2022-04-16

## 2022-04-16 PROBLEM — Z86.19 HISTORY OF HELICOBACTER PYLORI INFECTION: Status: RESOLVED | Noted: 2019-04-30 | Resolved: 2022-04-16

## 2022-04-16 PROBLEM — Z92.29 HISTORY OF PNEUMOCOCCAL VACCINATION: Status: RESOLVED | Noted: 2018-03-27 | Resolved: 2022-04-16

## 2022-04-16 PROBLEM — A04.8 HELICOBACTER PYLORI STOOL TEST POSITIVE: Status: RESOLVED | Noted: 2019-03-07 | Resolved: 2022-04-16

## 2022-04-16 LAB
25(OH)D3 SERPL-MCNC: 30.6 NG/ML
ALBUMIN SERPL ELPH-MCNC: 4.6 G/DL
ALP BLD-CCNC: 71 U/L
ALT SERPL-CCNC: 13 U/L
ANION GAP SERPL CALC-SCNC: 13 MMOL/L
AST SERPL-CCNC: 19 U/L
BASOPHILS # BLD AUTO: 0.05 K/UL
BASOPHILS NFR BLD AUTO: 0.6 %
BILIRUB SERPL-MCNC: 0.3 MG/DL
BUN SERPL-MCNC: 6 MG/DL
CALCIUM SERPL-MCNC: 9.8 MG/DL
CHLORIDE SERPL-SCNC: 97 MMOL/L
CHOLEST SERPL-MCNC: 148 MG/DL
CO2 SERPL-SCNC: 25 MMOL/L
CREAT SERPL-MCNC: 0.47 MG/DL
CREAT SPEC-SCNC: 21 MG/DL
EOSINOPHIL # BLD AUTO: 0.09 K/UL
EOSINOPHIL NFR BLD AUTO: 1.1 %
FERRITIN SERPL-MCNC: 11 NG/ML
FOLATE SERPL-MCNC: 18.2 NG/ML
GLUCOSE SERPL-MCNC: 103 MG/DL
HCT VFR BLD CALC: 37 %
HDLC SERPL-MCNC: 54 MG/DL
HGB BLD-MCNC: 11.3 G/DL
IMM GRANULOCYTES NFR BLD AUTO: 0.2 %
IRON SATN MFR SERPL: 10 %
IRON SERPL-MCNC: 43 UG/DL
LDLC SERPL CALC-MCNC: 65 MG/DL
LYMPHOCYTES # BLD AUTO: 2.53 K/UL
LYMPHOCYTES NFR BLD AUTO: 29.9 %
MAN DIFF?: NORMAL
MCHC RBC-ENTMCNC: 25.6 PG
MCHC RBC-ENTMCNC: 30.5 GM/DL
MCV RBC AUTO: 83.7 FL
MICROALBUMIN 24H UR DL<=1MG/L-MCNC: <1.2 MG/DL
MICROALBUMIN/CREAT 24H UR-RTO: NORMAL MG/G
MONOCYTES # BLD AUTO: 0.67 K/UL
MONOCYTES NFR BLD AUTO: 7.9 %
NEUTROPHILS # BLD AUTO: 5.11 K/UL
NEUTROPHILS NFR BLD AUTO: 60.3 %
NONHDLC SERPL-MCNC: 94 MG/DL
PLATELET # BLD AUTO: 233 K/UL
POTASSIUM SERPL-SCNC: 5 MMOL/L
PROT SERPL-MCNC: 7.2 G/DL
RBC # BLD: 4.42 M/UL
RBC # FLD: 13.9 %
SODIUM SERPL-SCNC: 135 MMOL/L
T4 FREE SERPL-MCNC: 1.5 NG/DL
TIBC SERPL-MCNC: 410 UG/DL
TRIGL SERPL-MCNC: 141 MG/DL
TSH SERPL-ACNC: 0.8 UIU/ML
UIBC SERPL-MCNC: 368 UG/DL
VIT B12 SERPL-MCNC: 586 PG/ML
WBC # FLD AUTO: 8.47 K/UL

## 2022-04-16 RX ORDER — CLONIDINE HYDROCHLORIDE 0.1 MG/1
0.1 TABLET ORAL TWICE DAILY
Qty: 180 | Refills: 3 | Status: DISCONTINUED | COMMUNITY
End: 2022-04-16

## 2022-04-16 NOTE — PHYSICAL EXAM
[No Acute Distress] : no acute distress [Well Nourished] : well nourished [Well Developed] : well developed [Well-Appearing] : well-appearing [Normal Sclera/Conjunctiva] : normal sclera/conjunctiva [PERRL] : pupils equal round and reactive to light [EOMI] : extraocular movements intact [Normal Outer Ear/Nose] : the outer ears and nose were normal in appearance [No JVD] : no jugular venous distention [No Lymphadenopathy] : no lymphadenopathy [Supple] : supple [Thyroid Normal, No Nodules] : the thyroid was normal and there were no nodules present [No Respiratory Distress] : no respiratory distress  [No Accessory Muscle Use] : no accessory muscle use [Clear to Auscultation] : lungs were clear to auscultation bilaterally [Normal Rate] : normal rate  [Regular Rhythm] : with a regular rhythm [Normal S1, S2] : normal S1 and S2 [No Murmur] : no murmur heard [No Carotid Bruits] : no carotid bruits [No Abdominal Bruit] : a ~M bruit was not heard ~T in the abdomen [No Varicosities] : no varicosities [Pedal Pulses Present] : the pedal pulses are present [No Edema] : there was no peripheral edema [No Palpable Aorta] : no palpable aorta [No Extremity Clubbing/Cyanosis] : no extremity clubbing/cyanosis [Normal Appearance] : normal in appearance [Soft] : abdomen soft [Non Tender] : non-tender [Non-distended] : non-distended [No Masses] : no abdominal mass palpated [No HSM] : no HSM [Normal Bowel Sounds] : normal bowel sounds [Normal Posterior Cervical Nodes] : no posterior cervical lymphadenopathy [Normal Anterior Cervical Nodes] : no anterior cervical lymphadenopathy [No CVA Tenderness] : no CVA  tenderness [No Spinal Tenderness] : no spinal tenderness [No Joint Swelling] : no joint swelling [Grossly Normal Strength/Tone] : grossly normal strength/tone [No Rash] : no rash [Coordination Grossly Intact] : coordination grossly intact [No Focal Deficits] : no focal deficits [Normal Gait] : normal gait [Deep Tendon Reflexes (DTR)] : deep tendon reflexes were 2+ and symmetric [Normal Affect] : the affect was normal [Normal Insight/Judgement] : insight and judgment were intact

## 2022-04-26 ENCOUNTER — OUTPATIENT (OUTPATIENT)
Dept: OUTPATIENT SERVICES | Facility: HOSPITAL | Age: 72
LOS: 1 days | End: 2022-04-26
Payer: COMMERCIAL

## 2022-04-26 ENCOUNTER — APPOINTMENT (OUTPATIENT)
Dept: RADIOLOGY | Facility: IMAGING CENTER | Age: 72
End: 2022-04-26
Payer: MEDICARE

## 2022-04-26 DIAGNOSIS — Z00.8 ENCOUNTER FOR OTHER GENERAL EXAMINATION: ICD-10-CM

## 2022-04-26 PROCEDURE — 77080 DXA BONE DENSITY AXIAL: CPT | Mod: 26

## 2022-04-26 PROCEDURE — 77080 DXA BONE DENSITY AXIAL: CPT

## 2022-05-05 NOTE — ED PROCEDURE NOTE - CPROC ED INDICATIONS1
1. BP is controlled in office today, 132/76.   2. Continue lisinopril 10 mg qam and metoprolol tartrate 50 mg bid.   3. Metoprolol dosage should not increase due to 1st degree AV block.   4. I reminded the patient of the importance of a low sodium diet and exercise.   RUQ Pain

## 2022-05-10 NOTE — REVIEW OF SYSTEMS
[Vision Problems] : vision problems [Dyspnea on Exertion] : dyspnea on exertion [Unsteady Walking] : ataxia [Negative] : Heme/Lymph [FreeTextEntry9] : legs heavy [de-identified] : neuropathy

## 2022-05-10 NOTE — HISTORY OF PRESENT ILLNESS
[de-identified] : Abeba is a 72 yo female with a h/o DM, HTN, hyperlipidemia , CAD and gastritis here for her AWV.  \par \par She c/o occ NEGRETE with stairs\par No CP\par Legs weak and heavy with walking\par \par Doesn't check sugars and doesn't want to.  She did see opthalmology in September.  She was found to have cataracts and Dry AMD OS, early stage.  No  mention of retinopathy.\par \par She had an EGD and colonoscopy in 6/21 that showed a diminutive 2 mm polyp that was a tubular adenoma and and EGD that showed candida esophagitis but no H. Pylori or evidence of celiac disease and gastritis.  She was advised to repeat the colonoscopy in 5 years and completed a course of nystatin swish and swallow.  She is on pantoprazole for the gastritis.  Gets occasional abdominal cramps but none recently.\par \par Mammogram is UTD and is due next month.  SHe also needs a BMD.\par \par

## 2022-05-10 NOTE — HEALTH RISK ASSESSMENT
[Never] : Never [No] : In the past 12 months have you used drugs other than those required for medical reasons? No [No falls in past year] : Patient reported no falls in the past year [1] : 1) Little interest or pleasure doing things for several days (1) [0] : 2) Feeling down, depressed, or hopeless: Not at all (0) [PHQ-2 Negative - No further assessment needed] : PHQ-2 Negative - No further assessment needed [No Retinopathy] : No retinopathy [With Patient/Caregiver] : , with patient/caregiver [Good] : ~his/her~  mood as  good [Patient reported mammogram was normal] : Patient reported mammogram was normal [Patient reported bone density results were normal] : Patient reported bone density results were normal [Health Literacy] : health literacy [Alone] : lives alone [Fully functional (bathing, dressing, toileting, transferring, walking, feeding)] : Fully functional (bathing, dressing, toileting, transferring, walking, feeding) [Fully functional (using the telephone, shopping, preparing meals, housekeeping, doing laundry, using] : Fully functional and needs no help or supervision to perform IADLs (using the telephone, shopping, preparing meals, housekeeping, doing laundry, using transportation, managing medications and managing finances) [Reports changes in vision] : Reports changes in vision [de-identified] : minimal [[id number]] : 1 [EyeExamDate] : 09/21 [Change in mental status noted] : No change in mental status noted [Language] : denies difficulty with language [Behavior] : denies difficulty with behavior [Learning/Retaining New Information] : denies difficulty learning/retaining new information [Handling Complex Tasks] : denies difficulty handling complex tasks [Reasoning] : denies difficulty with reasoning [Spatial Ability and Orientation] : denies difficulty with spatial ability and orientation [Reports changes in hearing] : Reports no changes in hearing [MammogramDate] : 03/21 [MammogramComments] : BIRADS 2 [BoneDensityDate] : 07/19 [ColonoscopyDate] : 06/21 [ColonoscopyComments] : 2mm tubular adenoma, repeat in 5 years [FreeTextEntry2] : retired [AdvancecareDate] : 02/22 [FreeTextEntry4] : Does not have - has no family.  Has a good friend which she could consider.

## 2022-05-10 NOTE — ADDENDUM
[FreeTextEntry1] : Called pt - never received results of BMD which was normal and labs (notable for borderline anemia). Message again left.  She was advised at the time of her AWV to f/u in 3 months.  Message left to schedule as she has yet to do so.  Repeat CBC at that time.

## 2022-05-26 ENCOUNTER — APPOINTMENT (OUTPATIENT)
Dept: CARDIOLOGY | Facility: CLINIC | Age: 72
End: 2022-05-26
Payer: MEDICARE

## 2022-05-26 VITALS
WEIGHT: 138 LBS | BODY MASS INDEX: 27.09 KG/M2 | HEIGHT: 60 IN | SYSTOLIC BLOOD PRESSURE: 144 MMHG | HEART RATE: 61 BPM | DIASTOLIC BLOOD PRESSURE: 73 MMHG | OXYGEN SATURATION: 97 %

## 2022-05-26 PROCEDURE — 93000 ELECTROCARDIOGRAM COMPLETE: CPT

## 2022-05-26 PROCEDURE — 99214 OFFICE O/P EST MOD 30 MIN: CPT

## 2022-05-30 ENCOUNTER — NON-APPOINTMENT (OUTPATIENT)
Age: 72
End: 2022-05-30

## 2022-05-30 NOTE — CARDIOLOGY SUMMARY
[de-identified] : 5/26/22\par Sinus  Rhythm \par -RSR(V1) -nondiagnostic. \par  Voltage criteria for LVH  (R(I)+S(III) exceeds 2.00 mV). \par  -ST depression  -Seen with left ventricular hypertrophy (strain) or digitalis effect -consider ischemia. \par \par ABNORMAL

## 2022-05-30 NOTE — DISCUSSION/SUMMARY
[FreeTextEntry1] : s/p RPDA AARON\par No angina\par No HF \par \par  c/w ASA\par \par RTO 4-6 mo\par

## 2022-05-31 ENCOUNTER — APPOINTMENT (OUTPATIENT)
Dept: INTERNAL MEDICINE | Facility: CLINIC | Age: 72
End: 2022-05-31
Payer: MEDICARE

## 2022-05-31 ENCOUNTER — RESULT CHARGE (OUTPATIENT)
Age: 72
End: 2022-05-31

## 2022-05-31 VITALS
WEIGHT: 138 LBS | SYSTOLIC BLOOD PRESSURE: 138 MMHG | RESPIRATION RATE: 14 BRPM | OXYGEN SATURATION: 98 % | HEART RATE: 72 BPM | HEIGHT: 60 IN | BODY MASS INDEX: 27.09 KG/M2 | DIASTOLIC BLOOD PRESSURE: 76 MMHG

## 2022-05-31 DIAGNOSIS — R10.33 PERIUMBILICAL PAIN: ICD-10-CM

## 2022-05-31 DIAGNOSIS — R10.2 PELVIC AND PERINEAL PAIN: ICD-10-CM

## 2022-05-31 PROCEDURE — 99214 OFFICE O/P EST MOD 30 MIN: CPT

## 2022-05-31 NOTE — HISTORY OF PRESENT ILLNESS
[de-identified] : C/O vaginal pain on and off for the past few months.  Pain is severe in nature and can last for 10 min.  Not associated with urination.  Has not seen GYN in a long time.  No bulging.\par \par C/O longstanding periumbilical pain.  Saw GI in the past about this and had a CT scan.  CT scan from hospitalization last year at [place] reviewed and did not show any thing to account for her complaints of pain.  She was given Levbid in the past by GI but never picked up the prescription because it was too expensive.  She uses pantoprazole as needed for her epigastric pain which is secondary to GERD.  She has had both a recent EGD and colonoscopy.\par \par She also has been getting vaginal pain that is sharp in nature.  Denies any sensation of uterine prolapse.  Has not seen GYN in a very long time.\par \par In terms of her diabetes, she has no complaints of polyuria or polydipsia.  Hemoglobin A1c was done today at the time of this visit.\par \par

## 2022-05-31 NOTE — ASSESSMENT
[FreeTextEntry1] : 1.  Abdominal pain -patient is a poor historian.  Has had longstanding periumbilical pain of unknown etiology from what I can piece together by review of the chart.  She also has a history of epigastric pain and has had an EGD.  She gives some vague history of dark stools but not black but she did have a mild anemia at the time of her last visit.  We will recheck her CBC along with iron studies today and likely will refer back to GI.\par 2.  Vaginal pain which is sharp in nature.  She does not give a history of prolapse but has not had an exam in years.  Referred to GYN for further evaluation.  Will send for a pelvic/transvaginal ultrasound as well.\par 3.  Diabetes mellitus with hemoglobin A1c equal to 7.0.  We will continue with current management.\par 4.  Follow-up in 3 months

## 2022-06-09 LAB
ALBUMIN SERPL ELPH-MCNC: 4.6 G/DL
ALP BLD-CCNC: 77 U/L
ALT SERPL-CCNC: 13 U/L
ANION GAP SERPL CALC-SCNC: 15 MMOL/L
AST SERPL-CCNC: 19 U/L
BASOPHILS # BLD AUTO: 0.05 K/UL
BASOPHILS NFR BLD AUTO: 0.7 %
BILIRUB SERPL-MCNC: 0.2 MG/DL
BUN SERPL-MCNC: 6 MG/DL
CALCIUM SERPL-MCNC: 9.5 MG/DL
CHLORIDE SERPL-SCNC: 96 MMOL/L
CO2 SERPL-SCNC: 24 MMOL/L
CREAT SERPL-MCNC: 0.41 MG/DL
EGFR: 105 ML/MIN/1.73M2
EOSINOPHIL # BLD AUTO: 0.09 K/UL
EOSINOPHIL NFR BLD AUTO: 1.2 %
FERRITIN SERPL-MCNC: 15 NG/ML
GLUCOSE SERPL-MCNC: 174 MG/DL
HBA1C MFR BLD HPLC: 7
HCT VFR BLD CALC: 35.8 %
HGB BLD-MCNC: 10.8 G/DL
IMM GRANULOCYTES NFR BLD AUTO: 0.1 %
IRON SATN MFR SERPL: 10 %
IRON SERPL-MCNC: 40 UG/DL
LYMPHOCYTES # BLD AUTO: 2.36 K/UL
LYMPHOCYTES NFR BLD AUTO: 32.3 %
MAN DIFF?: NORMAL
MCHC RBC-ENTMCNC: 25.1 PG
MCHC RBC-ENTMCNC: 30.2 GM/DL
MCV RBC AUTO: 83.3 FL
MONOCYTES # BLD AUTO: 0.62 K/UL
MONOCYTES NFR BLD AUTO: 8.5 %
NEUTROPHILS # BLD AUTO: 4.18 K/UL
NEUTROPHILS NFR BLD AUTO: 57.2 %
PLATELET # BLD AUTO: 219 K/UL
POTASSIUM SERPL-SCNC: 4.3 MMOL/L
PROT SERPL-MCNC: 7.1 G/DL
RBC # BLD: 4.3 M/UL
RBC # FLD: 14.6 %
SODIUM SERPL-SCNC: 135 MMOL/L
TIBC SERPL-MCNC: 390 UG/DL
UIBC SERPL-MCNC: 350 UG/DL
WBC # FLD AUTO: 7.31 K/UL

## 2022-06-14 ENCOUNTER — OUTPATIENT (OUTPATIENT)
Dept: OUTPATIENT SERVICES | Facility: HOSPITAL | Age: 72
LOS: 1 days | End: 2022-06-14
Payer: COMMERCIAL

## 2022-06-14 ENCOUNTER — APPOINTMENT (OUTPATIENT)
Dept: MAMMOGRAPHY | Facility: IMAGING CENTER | Age: 72
End: 2022-06-14
Payer: MEDICARE

## 2022-06-14 ENCOUNTER — RESULT REVIEW (OUTPATIENT)
Age: 72
End: 2022-06-14

## 2022-06-14 DIAGNOSIS — Z00.8 ENCOUNTER FOR OTHER GENERAL EXAMINATION: ICD-10-CM

## 2022-06-14 PROCEDURE — 77063 BREAST TOMOSYNTHESIS BI: CPT | Mod: 26

## 2022-06-14 PROCEDURE — 77063 BREAST TOMOSYNTHESIS BI: CPT

## 2022-06-14 PROCEDURE — 77067 SCR MAMMO BI INCL CAD: CPT | Mod: 26

## 2022-06-14 PROCEDURE — 77067 SCR MAMMO BI INCL CAD: CPT

## 2022-07-07 ENCOUNTER — APPOINTMENT (OUTPATIENT)
Dept: ULTRASOUND IMAGING | Facility: IMAGING CENTER | Age: 72
End: 2022-07-07

## 2022-07-07 ENCOUNTER — OUTPATIENT (OUTPATIENT)
Dept: OUTPATIENT SERVICES | Facility: HOSPITAL | Age: 72
LOS: 1 days | End: 2022-07-07
Payer: MEDICARE

## 2022-07-07 DIAGNOSIS — Z00.8 ENCOUNTER FOR OTHER GENERAL EXAMINATION: ICD-10-CM

## 2022-07-07 DIAGNOSIS — R10.2 PELVIC AND PERINEAL PAIN: ICD-10-CM

## 2022-07-07 PROCEDURE — 76856 US EXAM PELVIC COMPLETE: CPT

## 2022-07-07 PROCEDURE — 76856 US EXAM PELVIC COMPLETE: CPT | Mod: 26

## 2022-07-07 PROCEDURE — 76830 TRANSVAGINAL US NON-OB: CPT | Mod: 26

## 2022-07-07 PROCEDURE — 76830 TRANSVAGINAL US NON-OB: CPT

## 2022-08-16 NOTE — ED ADULT NURSE NOTE - SPO2 (%)
97 Surgeon/Pathologist Verbiage (Will Incorporate Name Of Surgeon From Intro If Not Blank): operated in two distinct and integrated capacities as the surgeon and pathologist.

## 2022-08-24 ENCOUNTER — NON-APPOINTMENT (OUTPATIENT)
Age: 72
End: 2022-08-24

## 2022-08-24 ENCOUNTER — APPOINTMENT (OUTPATIENT)
Dept: CARDIOLOGY | Facility: CLINIC | Age: 72
End: 2022-08-24

## 2022-08-24 VITALS
HEART RATE: 71 BPM | WEIGHT: 138 LBS | OXYGEN SATURATION: 96 % | SYSTOLIC BLOOD PRESSURE: 154 MMHG | DIASTOLIC BLOOD PRESSURE: 79 MMHG | BODY MASS INDEX: 27.09 KG/M2 | HEIGHT: 60 IN

## 2022-08-24 PROCEDURE — 93000 ELECTROCARDIOGRAM COMPLETE: CPT

## 2022-08-24 PROCEDURE — 99215 OFFICE O/P EST HI 40 MIN: CPT | Mod: 25

## 2022-08-25 NOTE — CARDIOLOGY SUMMARY
[de-identified] : 8/24/22\par Sinus  Rhythm \par -RSR(V1) -nondiagnostic. \par  Voltage criteria for LVH  (R(I)+S(III) exceeds 2.50 mV)  -Voltage criteria w/o ST/T abnormality may be normal. \par  -Inferior ST-elevation -repolarization variant. \par  -ST depression  -Nondiagnostic -possible digitalis effect, -consider subendocardial injury/ischemia. \par \par ABNORMAL

## 2022-08-25 NOTE — CARDIOLOGY SUMMARY
[de-identified] : 8/24/22\par Sinus  Rhythm \par -RSR(V1) -nondiagnostic. \par  Voltage criteria for LVH  (R(I)+S(III) exceeds 2.50 mV)  -Voltage criteria w/o ST/T abnormality may be normal. \par  -Inferior ST-elevation -repolarization variant. \par  -ST depression  -Nondiagnostic -possible digitalis effect, -consider subendocardial injury/ischemia. \par \par ABNORMAL

## 2022-08-25 NOTE — DISCUSSION/SUMMARY
[EKG obtained to assist in diagnosis and management of assessed problem(s)] : EKG obtained to assist in diagnosis and management of assessed problem(s) [FreeTextEntry1] : s/p RPDA AARON\par Returning with CP\par Discussed testing options including cath vs stress test - elected to proceed with cath\par \par  c/w ASA\par  \par Time spent reviewing prior stress and cath images

## 2022-09-02 ENCOUNTER — NON-APPOINTMENT (OUTPATIENT)
Age: 72
End: 2022-09-02

## 2022-09-03 ENCOUNTER — OUTPATIENT (OUTPATIENT)
Dept: OUTPATIENT SERVICES | Facility: HOSPITAL | Age: 72
LOS: 1 days | End: 2022-09-03
Payer: COMMERCIAL

## 2022-09-03 DIAGNOSIS — Z11.52 ENCOUNTER FOR SCREENING FOR COVID-19: ICD-10-CM

## 2022-09-03 DIAGNOSIS — R07.89 OTHER CHEST PAIN: ICD-10-CM

## 2022-09-03 LAB — SARS-COV-2 RNA SPEC QL NAA+PROBE: SIGNIFICANT CHANGE UP

## 2022-09-03 PROCEDURE — U0003: CPT

## 2022-09-03 PROCEDURE — U0005: CPT

## 2022-09-03 PROCEDURE — C9803: CPT

## 2022-09-06 ENCOUNTER — TRANSCRIPTION ENCOUNTER (OUTPATIENT)
Age: 72
End: 2022-09-06

## 2022-09-06 ENCOUNTER — OUTPATIENT (OUTPATIENT)
Dept: OUTPATIENT SERVICES | Facility: HOSPITAL | Age: 72
LOS: 1 days | End: 2022-09-06
Payer: MEDICARE

## 2022-09-06 VITALS
RESPIRATION RATE: 18 BRPM | HEART RATE: 70 BPM | OXYGEN SATURATION: 96 % | DIASTOLIC BLOOD PRESSURE: 73 MMHG | SYSTOLIC BLOOD PRESSURE: 163 MMHG | WEIGHT: 134.92 LBS | TEMPERATURE: 99 F | HEIGHT: 60 IN

## 2022-09-06 VITALS
SYSTOLIC BLOOD PRESSURE: 157 MMHG | DIASTOLIC BLOOD PRESSURE: 76 MMHG | RESPIRATION RATE: 18 BRPM | HEART RATE: 73 BPM | OXYGEN SATURATION: 95 %

## 2022-09-06 DIAGNOSIS — R07.89 OTHER CHEST PAIN: ICD-10-CM

## 2022-09-06 LAB
ANION GAP SERPL CALC-SCNC: 14 MMOL/L — SIGNIFICANT CHANGE UP (ref 5–17)
BUN SERPL-MCNC: 6 MG/DL — LOW (ref 7–23)
CALCIUM SERPL-MCNC: 9.6 MG/DL — SIGNIFICANT CHANGE UP (ref 8.4–10.5)
CHLORIDE SERPL-SCNC: 94 MMOL/L — LOW (ref 96–108)
CO2 SERPL-SCNC: 25 MMOL/L — SIGNIFICANT CHANGE UP (ref 22–31)
CREAT SERPL-MCNC: 0.36 MG/DL — LOW (ref 0.5–1.3)
EGFR: 108 ML/MIN/1.73M2 — SIGNIFICANT CHANGE UP
GLUCOSE SERPL-MCNC: 142 MG/DL — HIGH (ref 70–99)
HCT VFR BLD CALC: 35.6 % — SIGNIFICANT CHANGE UP (ref 34.5–45)
HGB BLD-MCNC: 11.1 G/DL — LOW (ref 11.5–15.5)
MCHC RBC-ENTMCNC: 24.8 PG — LOW (ref 27–34)
MCHC RBC-ENTMCNC: 31.2 GM/DL — LOW (ref 32–36)
MCV RBC AUTO: 79.6 FL — LOW (ref 80–100)
NRBC # BLD: 0 /100 WBCS — SIGNIFICANT CHANGE UP (ref 0–0)
PLATELET # BLD AUTO: 346 K/UL — SIGNIFICANT CHANGE UP (ref 150–400)
POTASSIUM SERPL-MCNC: 4.2 MMOL/L — SIGNIFICANT CHANGE UP (ref 3.5–5.3)
POTASSIUM SERPL-SCNC: 4.2 MMOL/L — SIGNIFICANT CHANGE UP (ref 3.5–5.3)
RBC # BLD: 4.47 M/UL — SIGNIFICANT CHANGE UP (ref 3.8–5.2)
RBC # FLD: 14.4 % — SIGNIFICANT CHANGE UP (ref 10.3–14.5)
SODIUM SERPL-SCNC: 133 MMOL/L — LOW (ref 135–145)
WBC # BLD: 10.41 K/UL — SIGNIFICANT CHANGE UP (ref 3.8–10.5)
WBC # FLD AUTO: 10.41 K/UL — SIGNIFICANT CHANGE UP (ref 3.8–10.5)

## 2022-09-06 PROCEDURE — C1894: CPT

## 2022-09-06 PROCEDURE — 93010 ELECTROCARDIOGRAM REPORT: CPT

## 2022-09-06 PROCEDURE — 93458 L HRT ARTERY/VENTRICLE ANGIO: CPT

## 2022-09-06 PROCEDURE — 93005 ELECTROCARDIOGRAM TRACING: CPT

## 2022-09-06 PROCEDURE — 85027 COMPLETE CBC AUTOMATED: CPT

## 2022-09-06 PROCEDURE — C1887: CPT

## 2022-09-06 PROCEDURE — 93458 L HRT ARTERY/VENTRICLE ANGIO: CPT | Mod: 26

## 2022-09-06 PROCEDURE — C1769: CPT

## 2022-09-06 PROCEDURE — 99152 MOD SED SAME PHYS/QHP 5/>YRS: CPT

## 2022-09-06 PROCEDURE — 93571 IV DOP VEL&/PRESS C FLO 1ST: CPT | Mod: 26,RC

## 2022-09-06 PROCEDURE — 93571 IV DOP VEL&/PRESS C FLO 1ST: CPT | Mod: RC

## 2022-09-06 PROCEDURE — 80048 BASIC METABOLIC PNL TOTAL CA: CPT

## 2022-09-06 RX ORDER — METFORMIN HYDROCHLORIDE 850 MG/1
1 TABLET ORAL
Qty: 0 | Refills: 0 | DISCHARGE

## 2022-09-06 RX ORDER — SODIUM CHLORIDE 9 MG/ML
250 INJECTION INTRAMUSCULAR; INTRAVENOUS; SUBCUTANEOUS ONCE
Refills: 0 | Status: COMPLETED | OUTPATIENT
Start: 2022-09-06 | End: 2022-09-06

## 2022-09-06 RX ORDER — SODIUM CHLORIDE 9 MG/ML
500 INJECTION INTRAMUSCULAR; INTRAVENOUS; SUBCUTANEOUS
Refills: 0 | Status: DISCONTINUED | OUTPATIENT
Start: 2022-09-06 | End: 2022-09-20

## 2022-09-06 RX ADMIN — SODIUM CHLORIDE 75 MILLILITER(S): 9 INJECTION INTRAMUSCULAR; INTRAVENOUS; SUBCUTANEOUS at 11:20

## 2022-09-06 RX ADMIN — SODIUM CHLORIDE 750 MILLILITER(S): 9 INJECTION INTRAMUSCULAR; INTRAVENOUS; SUBCUTANEOUS at 11:00

## 2022-09-06 NOTE — ASU DISCHARGE PLAN (ADULT/PEDIATRIC) - ASU DC SPECIAL INSTRUCTIONSFT
Wound Care:   the day AFTER your procedure remove bandage GENTLY, and clean using  mild soap and gentle warm, water stream, pat dry. leave OPEN to air. YOU MAY SHOWER   DO NOT apply lotions, creams, ointments, powder, perfumes to your incision site  DO NOT SOAK your site for 1 week ( no baths, no pools, no tubs, etc...)  Check  your groin and /or wrist daily. A small amount of bruising, and soreness are normal    ACTIVITY: for 24 hours   - DO NOT DRIVE  - DO NOT make any important decisions or sign legal documents   - DO NOT operate heavy machineries   - you may resume sexual activity in 48 hours, unless otherwise instructed by your cardiologist     If your procedure was done through the WRIST: for the NEXT 3DAYS:  - avoid pushing, pulling, with that affected wrist   - avoid repeated movement of that hand and wrist ( e.g: typing, hammering)  - DO NOT LIFT anything more than 5 lbs     If your procedure was done through the GROIN: for the NEXT 5 DAYS  - Limit climbing stairs, DO NOT soak in bathtub or pool  - no strenuous activities, pushing, pulling, straining  - Do not lift anything 10lbs or heavier     MEDICATION:   take your medications as explained ( see discharge paperwork)   If you received a STENT, you will be taking antiplatelet medications to KEEP YOUR STENT OPEN ( e.g: Aspirin, Plavix, Brilinta, Effient, etc).  Take as prescribed DO NOT STOP taking them without consulting with your cardiologist first.     Follow heart healthy diet recommended by your doctor, , if you smoke STOP SMOKING ( may call [phone] for center of tobacco control if you need assistance)     CALL your doctor to make appointment in 2 WEEKS     ***CALL YOUR DOCTOR***  if you experience: fever, chills, body aches, or severe pain, swelling, redness, heat or yellow discharge at incision site  If you experience Bleeding or excruciating pain at the procedural site, swelling ( golf ball size) at your procedural site  If you experience CHEST PAIN  If you experience extremity numbness, tingling, temperature change ( of your procedural site)   If you are unable to reach your doctor, you may contact:   -Cardiology Office at [facility] at [phone] or   - [facility] [phone]  - [facility] [phone]

## 2022-09-06 NOTE — ASU PATIENT PROFILE, ADULT - FALL HARM RISK - UNIVERSAL INTERVENTIONS
Bed in lowest position, wheels locked, appropriate side rails in place/Call bell, personal items and telephone in reach/Instruct patient to call for assistance before getting out of bed or chair/Non-slip footwear when patient is out of bed/[place] to call system/Physically safe environment - no spills, clutter or unnecessary equipment/Purposeful Proactive Rounding/Room/bathroom lighting operational, light cord in reach

## 2022-09-06 NOTE — H&P CARDIOLOGY - HISTORY OF PRESENT ILLNESS
This is a 73y/o female with no known implantable devices noted Covid 19 negative Vaccinated with Moderna x 2 doses with PMHX of CAD s/p Stent to RPDA , HTN, HLD, DM2 compliant with meds uncomplicated on Metformin last dose on 9/5/22 , Anemia, Bilateral cataracts, Arthritis knee, and Carpel tunnel syndrome. PT with recent complaints of Chest pain . Discussed with Dr. Pearce Stress vs [facility] . Pt to proceed with [facility] today with Dr. Pearce. Currently CP free No acute distress noted.   < from: Cardiac Cath Lab - Adult (10.24.19 @ 14:40) >  CORONARY VESSELS: The coronary circulation is right dominant.  LM:   --  LM: Normal.  LAD:   --  Mid LAD: There was a 40 % stenosis.  --  D1: Angiography showed minor luminal irregularities with no flow  limiting lesions.  CX:   --  Proximal circumflex: Angiography showed minor luminal  irregularities with no flow limiting lesions.  --  OM1: Angiography showed minor luminal irregularities with no flow  limiting lesions.  --  OM2: Angiography showed minor luminal irregularities with no flow  limiting lesions.  RCA:   --  RPDA: Therewas a discrete 90 % stenosis at the ostium of the  vessel segment.  COMPLICATIONS: There were no complications.  DIAGNOSTIC IMPRESSIONS: Severe RPDA disease. Mild LAD and Lcx disease.  INTERVENTIONAL RECOMMENDATIONS: Aspirin and Plavix  Prepared and signed by  Nick Pearce M.D.  Signed 10/27/2019 16:01:40    < end of copied text >  < from: CT Heart with Coronaries (08.13.19 @ 10:29) >  Coronary arteries:   Coronary artery calcium Agatston score:    Left main (LM) coronary artery:  3  Left anterior descending (LAD) coronary artery:  27  Ramus intermedius (RI):  15  Left circumflex (LCX) coronary artery:  1  Right coronary artery (RCA):  134  Total:  180    Right-dominant coronary circulation.     The LM coronary artery has minimal non-calcified and calcified plaque.    The LAD coronary artery has mild (30-49%) calcified plaque in the   proximal segment.  The mid to distal segments are small in caliber.  A   short, superficial myocardial bridge is noted in the mid segment.  The   diagonal branch has minimal (<30%) non-calcified plaque.    The small-caliber RI branch has mild (30-49%) calcified plaque.    The LCX coronary artery has minimal (<30%) non-calcified and calcified     < end of copied text >  < from: CT Heart with Coronaries (08.13.19 @ 10:29) >  plaque proximally.  The obtuse marginal (OM) branch has minimal (<30%)   non-calcified plaque.     The large-caliber RCA has minimal (<30%) non-calcified and calcified   plaque in the proximal, mid and distal segments.  The RCA gives off the   sinoatrial edyta branch.  The right posterior descending artery (PDA) has   severe (>70%) mixed non-calcified and calcified plaque at the ostium.    The PDA extends to the left ventricular apex.  The proximal right   posterolateral (RPL) branch has mild (30-49%) mixed non-calcified and   calcified plaque.     Aorta:  No thoracic aortic dissection noted in the visualized thoracic aorta.      Mild non-calcified and calcified plaque is present in the visualized   thoracic aorta.    Pericardium:  There is no pericardial effusion.      < end of copied text >  < from: CT Heart with Coronaries (08.13.19 @ 10:29) >  Chambers:  The left atrium appears qualitatively mildly dilated.    Non-cardiac: No potentially significant noncardiac findings.    < end of copied text >  < from: CT Heart with Coronaries (08.13.19 @ 10:29) >  IMPRESSION:    1.  Severe, obstructive 1-vessel coronary artery disease:  LM:  minimal non-calcified and calcified plaque  LAD:  mild (30-49%) calcified plaque in the proximal segment  minimal (<30%) non-calcified plaque in the diagonal branch  RI:  mild (30-49%) calcified plaque  LCX:  minimal (<30%) non-calcified and calcified plaque proximally  minimal (<30%) non-calcified plaque in the OM branch  RCA:  minimal (<30%) non-calcified and calcified plaque in the proximal, mid   and distal segments  severe (>70%) mixed non-calcified and calcified plaque at the ostium of   the PDA  mild (30-49%) mixed non-calcified and calcified plaque in the proximal   RPL branch  2.  Moderate coronary artery calcium (Agatston score 181) as delineated   above.    Coronary artery findings communicated with Dr. Carol Kitchen.  JOCE RAMOS M.D., ATTENDING CARDIOLOGIST  This document has been electronically signed.    < end of copied text >  < from: CT Heart with Coronaries (08.13.19 @ 10:29) >  IMPRESSION:    1.  Severe, obstructive 1-vessel coronary artery disease:  LM:  minimal non-calcified and calcified plaque  LAD:  mild (30-49%) calcified plaque in the proximal segment  minimal (<30%) non-calcified plaque in the diagonal branch  RI:  mild (30-49%) calcified plaque  LCX:  minimal (<30%) non-calcified and calcified plaque proximally  minimal (<30%) non-calcified plaque in the OM branch  RCA:  minimal (<30%) non-calcified and calcified plaque in the proximal, mid   and distal segments  severe (>70%) mixed non-calcified and calcified plaque at the ostium of   the PDA  mild (30-49%) mixed non-calcified and calcified plaque in the proximal   RPL branch  2.  Moderate coronary artery calcium (Agatston score 181) as delineated   above.      Coronary artery findings communicated with Dr. Carol Kitchen.  JOCE RAMOS M.D., ATTENDING CARDIOLOGIST  This document has been electronically signed.    < end of copied text >   This is a 73y/o [place] female with no known implantable devices noted Covid 19 negative Vaccinated with Moderna x 2 doses with PMHX of CAD s/p Stent to RPDA , HTN, HLD, DM2 compliant with meds uncomplicated on Metformin last dose on 9/5/22 , Anemia, Bilateral cataracts, Arthritis knee, and Carpel tunnel syndrome. PT with recent complaints of Chest pain midsternal pt describes as an " ache" and radiates to her bilateral shoulders with and without rest lasting 15minutes and relieved with Tylenol  . Discussed with Dr. Pearce Stress vs C . Pt to proceed with [facility] today with Dr. Pearce. Currently CP free No acute distress noted.   < from: Cardiac Cath Lab - Adult (10.24.19 @ 14:40) >  CORONARY VESSELS: The coronary circulation is right dominant.  LM:   --  LM: Normal.  LAD:   --  Mid LAD: There was a 40 % stenosis.  --  D1: Angiography showed minor luminal irregularities with no flow  limiting lesions.  CX:   --  Proximal circumflex: Angiography showed minor luminal  irregularities with no flow limiting lesions.  --  OM1: Angiography showed minor luminal irregularities with no flow  limiting lesions.  --  OM2: Angiography showed minor luminal irregularities with no flow  limiting lesions.  RCA:   --  RPDA: Therewas a discrete 90 % stenosis at the ostium of the  vessel segment.  COMPLICATIONS: There were no complications.  DIAGNOSTIC IMPRESSIONS: Severe RPDA disease. Mild LAD and Lcx disease.  INTERVENTIONAL RECOMMENDATIONS: Aspirin and Plavix  Prepared and signed by  Nick Pearce M.D.  Signed 10/27/2019 16:01:40    < end of copied text >  < from: CT Heart with Coronaries (08.13.19 @ 10:29) >  Coronary arteries:   Coronary artery calcium Agatston score:    Left main (LM) coronary artery:  3  Left anterior descending (LAD) coronary artery:  27  Ramus intermedius (RI):  15  Left circumflex (LCX) coronary artery:  1  Right coronary artery (RCA):  134  Total:  180    Right-dominant coronary circulation.     The LM coronary artery has minimal non-calcified and calcified plaque.    The LAD coronary artery has mild (30-49%) calcified plaque in the   proximal segment.  The mid to distal segments are small in caliber.  A   short, superficial myocardial bridge is noted in the mid segment.  The   diagonal branch has minimal (<30%) non-calcified plaque.    The small-caliber RI branch has mild (30-49%) calcified plaque.    The LCX coronary artery has minimal (<30%) non-calcified and calcified     < end of copied text >  < from: CT Heart with Coronaries (08.13.19 @ 10:29) >  plaque proximally.  The obtuse marginal (OM) branch has minimal (<30%)   non-calcified plaque.     The large-caliber RCA has minimal (<30%) non-calcified and calcified   plaque in the proximal, mid and distal segments.  The RCA gives off the   sinoatrial edyta branch.  The right posterior descending artery (PDA) has   severe (>70%) mixed non-calcified and calcified plaque at the ostium.    The PDA extends to the left ventricular apex.  The proximal right   posterolateral (RPL) branch has mild (30-49%) mixed non-calcified and   calcified plaque.     Aorta:  No thoracic aortic dissection noted in the visualized thoracic aorta.      Mild non-calcified and calcified plaque is present in the visualized   thoracic aorta.    Pericardium:  There is no pericardial effusion.      < end of copied text >  < from: CT Heart with Coronaries (08.13.19 @ 10:29) >  Chambers:  The left atrium appears qualitatively mildly dilated.    Non-cardiac: No potentially significant noncardiac findings.    < end of copied text >  < from: CT Heart with Coronaries (08.13.19 @ 10:29) >  IMPRESSION:    1.  Severe, obstructive 1-vessel coronary artery disease:  LM:  minimal non-calcified and calcified plaque  LAD:  mild (30-49%) calcified plaque in the proximal segment  minimal (<30%) non-calcified plaque in the diagonal branch  RI:  mild (30-49%) calcified plaque  LCX:  minimal (<30%) non-calcified and calcified plaque proximally  minimal (<30%) non-calcified plaque in the OM branch  RCA:  minimal (<30%) non-calcified and calcified plaque in the proximal, mid   and distal segments  severe (>70%) mixed non-calcified and calcified plaque at the ostium of   the PDA  mild (30-49%) mixed non-calcified and calcified plaque in the proximal   RPL branch  2.  Moderate coronary artery calcium (Agatston score 181) as delineated   above.    Coronary artery findings communicated with Dr. Carol Kitchen.  JOCE RAMOS M.D., ATTENDING CARDIOLOGIST  This document has been electronically signed.    < end of copied text >  < from: CT Heart with Coronaries (08.13.19 @ 10:29) >  IMPRESSION:    1.  Severe, obstructive 1-vessel coronary artery disease:  LM:  minimal non-calcified and calcified plaque  LAD:  mild (30-49%) calcified plaque in the proximal segment  minimal (<30%) non-calcified plaque in the diagonal branch  RI:  mild (30-49%) calcified plaque  LCX:  minimal (<30%) non-calcified and calcified plaque proximally  minimal (<30%) non-calcified plaque in the OM branch  RCA:  minimal (<30%) non-calcified and calcified plaque in the proximal, mid   and distal segments  severe (>70%) mixed non-calcified and calcified plaque at the ostium of   the PDA  mild (30-49%) mixed non-calcified and calcified plaque in the proximal   RPL branch  2.  Moderate coronary artery calcium (Agatston score 181) as delineated   above.      Coronary artery findings communicated with Dr. Carol Kitchen.  JOCE RAMOS M.D., ATTENDING CARDIOLOGIST  This document has been electronically signed.    < end of copied text >

## 2022-09-06 NOTE — ASU DISCHARGE PLAN (ADULT/PEDIATRIC) - CARE PROVIDER_API CALL
Nick Pearce (MD)  Cardiovascular Disease; Interventional Cardiology  [address] [place] [address]  Phone: [phone]  Fax: [phone]  Follow Up Time:

## 2022-10-13 PROBLEM — Z13.31 SCREENING FOR DEPRESSION: Status: ACTIVE | Noted: 2020-10-13

## 2022-10-25 ENCOUNTER — APPOINTMENT (OUTPATIENT)
Dept: INTERNAL MEDICINE | Facility: CLINIC | Age: 72
End: 2022-10-25

## 2022-10-25 VITALS
WEIGHT: 137 LBS | HEART RATE: 77 BPM | OXYGEN SATURATION: 98 % | DIASTOLIC BLOOD PRESSURE: 84 MMHG | HEIGHT: 60 IN | BODY MASS INDEX: 26.9 KG/M2 | SYSTOLIC BLOOD PRESSURE: 142 MMHG

## 2022-10-25 DIAGNOSIS — M79.89 OTHER SPECIFIED SOFT TISSUE DISORDERS: ICD-10-CM

## 2022-10-25 DIAGNOSIS — Z23 ENCOUNTER FOR IMMUNIZATION: ICD-10-CM

## 2022-10-25 LAB — HBA1C MFR BLD HPLC: 7.4

## 2022-10-25 PROCEDURE — 99214 OFFICE O/P EST MOD 30 MIN: CPT | Mod: 25

## 2022-10-25 PROCEDURE — G0008: CPT

## 2022-10-25 PROCEDURE — 90662 IIV NO PRSV INCREASED AG IM: CPT

## 2022-10-25 PROCEDURE — 83036 HEMOGLOBIN GLYCOSYLATED A1C: CPT | Mod: QW

## 2022-10-25 NOTE — HISTORY OF PRESENT ILLNESS
[de-identified] : 72-year-old female with a history of diabetes, hypertension and hyperlipidemia as well as CAD status post AARON and mild anemia with negative colonoscopy in the 7/21 here with complaints of weakness, chest pain when going up the stairs sometimes associated with shortness of breath and new onset edema in her feet.  Admits to drinking a lot of water.\par \par Chart was reviewed and she was seen recently by cardiology with plans for cardiac catheterization.  When I asked her about this she had no knowledge of it and does not even remember having the conversation with the nurse.  According to her she was told everything was fine.  Per cardiology note she had no lower extremity edema at that time.

## 2022-10-25 NOTE — ASSESSMENT
[FreeTextEntry1] : 1.  Intermittent chest pain with stairs which she describes as an uncomfortable feeling, some associated shortness of breath and now with complaints of swelling in feet which was seen on exam.  She also complains of weakness.  Per review of the chart she was supposed to had a cardiac catheterization because of the complaints of chest pain but seem to have no idea that this test was supposed to occur.  Her health literacy is very low and she often does not understand things.  I went through in detail what the cardiologist recommendations were, sent a message to the cardiologist and instructed her to call cardiology as soon as she gets home to discuss rescheduling the test.  In the meantime we will check labs to check her albumin level as well as rule out any hyponatremia.  She admitted to drinking a lot of water during the day and I we will have her cut back on this.  She can keep her legs elevated when sitting.  We will check a BNP.\par 2.  Diabetes mellitus with hemoglobin A1c equal to 7.4 today.  No change in management.\par 3.  Hypertension with adequate blood pressure today will not make any changes pending cardiology evaluation.\par 4.  Mild anemia with borderline iron studies and essentially normal colonoscopy and mild gastritis on EGD in 7/21.  Will rule out any worsening anemia today.  CBC and iron studies sent.\par 5.  Hyperlipidemia with last LDL below goal at 65.  Continue current management.  Check lipids.\par 6.  Follow-up pending above.

## 2022-11-27 ENCOUNTER — RX RENEWAL (OUTPATIENT)
Age: 72
End: 2022-11-27

## 2022-11-27 RX ORDER — PANTOPRAZOLE 40 MG/1
40 TABLET, DELAYED RELEASE ORAL
Qty: 90 | Refills: 3 | Status: ACTIVE | COMMUNITY
Start: 2019-04-02 | End: 1900-01-01

## 2022-12-05 LAB
ALBUMIN SERPL ELPH-MCNC: 4 G/DL
ALP BLD-CCNC: 85 U/L
ALT SERPL-CCNC: 10 U/L
ANION GAP SERPL CALC-SCNC: 13 MMOL/L
AST SERPL-CCNC: 17 U/L
BASOPHILS # BLD AUTO: 0.05 K/UL
BASOPHILS NFR BLD AUTO: 0.6 %
BILIRUB SERPL-MCNC: 0.2 MG/DL
BUN SERPL-MCNC: 5 MG/DL
CALCIUM SERPL-MCNC: 9.9 MG/DL
CHLORIDE SERPL-SCNC: 98 MMOL/L
CHOLEST SERPL-MCNC: 141 MG/DL
CO2 SERPL-SCNC: 27 MMOL/L
CREAT SERPL-MCNC: 0.55 MG/DL
CREAT SPEC-SCNC: 22 MG/DL
EGFR: 97 ML/MIN/1.73M2
EOSINOPHIL # BLD AUTO: 0.13 K/UL
EOSINOPHIL NFR BLD AUTO: 1.5 %
FERRITIN SERPL-MCNC: 23 NG/ML
GLUCOSE SERPL-MCNC: 128 MG/DL
HCT VFR BLD CALC: 34 %
HDLC SERPL-MCNC: 49 MG/DL
HGB BLD-MCNC: 10.3 G/DL
IMM GRANULOCYTES NFR BLD AUTO: 0.7 %
IRON SATN MFR SERPL: 10 %
IRON SERPL-MCNC: 32 UG/DL
LDLC SERPL CALC-MCNC: 60 MG/DL
LYMPHOCYTES # BLD AUTO: 2.06 K/UL
LYMPHOCYTES NFR BLD AUTO: 23.8 %
MAN DIFF?: NORMAL
MCHC RBC-ENTMCNC: 25.2 PG
MCHC RBC-ENTMCNC: 30.3 GM/DL
MCV RBC AUTO: 83.1 FL
MICROALBUMIN 24H UR DL<=1MG/L-MCNC: 81.1 MG/DL
MICROALBUMIN/CREAT 24H UR-RTO: 3621 MG/G
MONOCYTES # BLD AUTO: 0.6 K/UL
MONOCYTES NFR BLD AUTO: 6.9 %
NEUTROPHILS # BLD AUTO: 5.74 K/UL
NEUTROPHILS NFR BLD AUTO: 66.5 %
NONHDLC SERPL-MCNC: 92 MG/DL
NT-PROBNP SERPL-MCNC: 1561 PG/ML
PLATELET # BLD AUTO: 300 K/UL
POTASSIUM SERPL-SCNC: 4.3 MMOL/L
PROT SERPL-MCNC: 6.9 G/DL
RBC # BLD: 4.09 M/UL
RBC # FLD: 17 %
SODIUM SERPL-SCNC: 138 MMOL/L
TIBC SERPL-MCNC: 333 UG/DL
TRIGL SERPL-MCNC: 164 MG/DL
UIBC SERPL-MCNC: 301 UG/DL
WBC # FLD AUTO: 8.64 K/UL

## 2023-01-08 ENCOUNTER — RX RENEWAL (OUTPATIENT)
Age: 73
End: 2023-01-08

## 2023-01-21 ENCOUNTER — RX RENEWAL (OUTPATIENT)
Age: 73
End: 2023-01-21

## 2023-01-31 ENCOUNTER — APPOINTMENT (OUTPATIENT)
Dept: INTERNAL MEDICINE | Facility: CLINIC | Age: 73
End: 2023-01-31
Payer: MEDICARE

## 2023-01-31 VITALS
HEART RATE: 69 BPM | SYSTOLIC BLOOD PRESSURE: 122 MMHG | HEIGHT: 60 IN | OXYGEN SATURATION: 97 % | BODY MASS INDEX: 26.7 KG/M2 | WEIGHT: 136 LBS | DIASTOLIC BLOOD PRESSURE: 74 MMHG

## 2023-01-31 DIAGNOSIS — E11.9 TYPE 2 DIABETES MELLITUS W/OUT COMPLICATIONS: ICD-10-CM

## 2023-01-31 DIAGNOSIS — M75.41 IMPINGEMENT SYNDROME OF RIGHT SHOULDER: ICD-10-CM

## 2023-01-31 LAB — HBA1C MFR BLD HPLC: 8

## 2023-01-31 PROCEDURE — 99214 OFFICE O/P EST MOD 30 MIN: CPT | Mod: 25

## 2023-01-31 PROCEDURE — 83036 HEMOGLOBIN GLYCOSYLATED A1C: CPT | Mod: QW

## 2023-01-31 NOTE — ASSESSMENT
[FreeTextEntry1] : 1.  Diabetes mellitus not controlled distillates should be.  Hemoglobin A1c today 8.8 which is up from her prior.  I would like to add Jardiance 10 mg daily as she has nonobstructive CAD but she would like to work on her diet first.  If no improvement in her A1c in 3 months will add the Jardiance.\par 2.  Nonobstructive CAD for medical management.  Last LDL at goal of less than 70.  Cardiac cath from 9/22 reviewed as it is not in the outpatient chart.  I was able to located in the HIE.\par 3.  Mild iron deficiency anemia of unclear etiology.  Colonoscopy negative in 2021.  We will check iron studies again today along with a B12 and folate as she is on metformin and has not had 1 in the year.  May benefit from an endoscopy or might even need to send her to hematology.\par 4.  Hypertension, well controlled today.  Continue current management\par 5.  Right shoulder pain/impingement -discussed range of motion exercises.  Has been doing Salonpas patches as well as Tylenol without effect.  Would like to see Ortho.  Referral given.\par 6.  Return to office in 3 months or as needed

## 2023-01-31 NOTE — HISTORY OF PRESENT ILLNESS
[de-identified] : 72-year-old female with history of diabetes, CAD, hypertension, hyperlipidemia here with complaints of right shoulder pain that has been going on for a number of months.  It is now going down onto the anterior portion of her chest above the breast and she became concerned.  She also has sciatica that is intermittent in nature but she has not complaining of any real pain into the legs at this time.  Maybe a little bit on the right.\par \par She also has a history of mild iron deficiency anemia.  Does not eat any red meats.  Had a negative colonoscopy in 2021.  Does eat green leafy vegetables and will see black stool after eating spinach.  Otherwise stool is normal.\par \par

## 2023-02-07 ENCOUNTER — APPOINTMENT (OUTPATIENT)
Dept: CARDIOLOGY | Facility: CLINIC | Age: 73
End: 2023-02-07
Payer: MEDICARE

## 2023-02-07 ENCOUNTER — NON-APPOINTMENT (OUTPATIENT)
Age: 73
End: 2023-02-07

## 2023-02-07 VITALS — DIASTOLIC BLOOD PRESSURE: 72 MMHG | HEART RATE: 64 BPM | OXYGEN SATURATION: 95 % | SYSTOLIC BLOOD PRESSURE: 149 MMHG

## 2023-02-07 DIAGNOSIS — I10 ESSENTIAL (PRIMARY) HYPERTENSION: ICD-10-CM

## 2023-02-07 DIAGNOSIS — E78.5 HYPERLIPIDEMIA, UNSPECIFIED: ICD-10-CM

## 2023-02-07 PROCEDURE — 99214 OFFICE O/P EST MOD 30 MIN: CPT | Mod: 25

## 2023-02-07 PROCEDURE — 93000 ELECTROCARDIOGRAM COMPLETE: CPT

## 2023-02-08 NOTE — DISCUSSION/SUMMARY
[FreeTextEntry1] : s/p RPDA AARON \par Recath 9/22 with patent stent and mild CAD\par No change to meds\par \par c/w ASA\par  \par Time spent reviewing prior stress and cath images [EKG obtained to assist in diagnosis and management of assessed problem(s)] : EKG obtained to assist in diagnosis and management of assessed problem(s)

## 2023-02-08 NOTE — CARDIOLOGY SUMMARY
[de-identified] : 2/7/23\par Sinus  Rhythm \par -RSR(V1) -nondiagnostic. \par  Voltage criteria for LVH  (R(I)+S(III) exceeds 2.00 mV). \par  -ST depression  -Seen with left ventricular hypertrophy (strain) or digitalis effect -consider ischemia. \par \par ABNORMAL

## 2023-02-13 LAB
BASOPHILS # BLD AUTO: 0.05 K/UL
BASOPHILS NFR BLD AUTO: 0.6 %
EOSINOPHIL # BLD AUTO: 0.22 K/UL
EOSINOPHIL NFR BLD AUTO: 2.7 %
FERRITIN SERPL-MCNC: 10 NG/ML
FOLATE SERPL-MCNC: 17.5 NG/ML
HCT VFR BLD CALC: 34.5 %
HGB BLD-MCNC: 10.2 G/DL
IMM GRANULOCYTES NFR BLD AUTO: 0.4 %
IRON SATN MFR SERPL: 6 %
IRON SERPL-MCNC: 23 UG/DL
LYMPHOCYTES # BLD AUTO: 2.55 K/UL
LYMPHOCYTES NFR BLD AUTO: 31.8 %
MAN DIFF?: NORMAL
MCHC RBC-ENTMCNC: 23.8 PG
MCHC RBC-ENTMCNC: 29.6 GM/DL
MCV RBC AUTO: 80.6 FL
MONOCYTES # BLD AUTO: 0.59 K/UL
MONOCYTES NFR BLD AUTO: 7.3 %
NEUTROPHILS # BLD AUTO: 4.59 K/UL
NEUTROPHILS NFR BLD AUTO: 57.2 %
PLATELET # BLD AUTO: 254 K/UL
RBC # BLD: 4.28 M/UL
RBC # FLD: 14.4 %
TIBC SERPL-MCNC: 420 UG/DL
UIBC SERPL-MCNC: 397 UG/DL
VIT B12 SERPL-MCNC: 896 PG/ML
WBC # FLD AUTO: 8.03 K/UL

## 2023-03-07 ENCOUNTER — APPOINTMENT (OUTPATIENT)
Dept: ORTHOPEDIC SURGERY | Facility: CLINIC | Age: 73
End: 2023-03-07
Payer: MEDICARE

## 2023-03-07 VITALS
HEART RATE: 63 BPM | BODY MASS INDEX: 26.7 KG/M2 | SYSTOLIC BLOOD PRESSURE: 152 MMHG | WEIGHT: 136 LBS | HEIGHT: 60 IN | TEMPERATURE: 97.8 F | OXYGEN SATURATION: 100 % | DIASTOLIC BLOOD PRESSURE: 78 MMHG

## 2023-03-07 DIAGNOSIS — M75.31 CALCIFIC TENDINITIS OF RIGHT SHOULDER: ICD-10-CM

## 2023-03-07 PROCEDURE — 99203 OFFICE O/P NEW LOW 30 MIN: CPT

## 2023-03-07 PROCEDURE — 73030 X-RAY EXAM OF SHOULDER: CPT | Mod: RT

## 2023-03-09 PROBLEM — M75.31 CALCIFIC TENDINITIS OF RIGHT SHOULDER: Status: ACTIVE | Noted: 2023-03-09

## 2023-03-09 NOTE — HISTORY OF PRESENT ILLNESS
[de-identified] : 72 year old female with chronic right shoulder pain. She describes the pain as intermittent and achy. The pain radiates from the shoulder to her wrist and she notes intermittent tingling of her fingers. She denies prior injury or trauma. She takes Tylenol for the pain with minimal relief. She states her primary care MD told her she she can not take NSAIDs but is unsure of the reason.  \par \par The patient's past medical history, past surgical history, medications and allergies were reviewed by me today with the patient and documented accordingly. In addition, the patient's family and social history, which were noncontributory to this visit, were reviewed also.

## 2023-03-09 NOTE — ADDENDUM
[FreeTextEntry1] : This note was written by Majo Pelletier on 03/07/2023 acting solely as a scribe for Dr. Ben Cee.\par \par All medical record entries made by the Scribe were at my, Dr. Ben Cee, direction and personally dictated by me on 03/07/2023. I have personally reviewed the chart and agree that the record accurately reflects my personal performance of the history, physical exam, assessment and plan.

## 2023-03-09 NOTE — DISCUSSION/SUMMARY
[de-identified] : 71 y/o female with right shoulder calcific tendinopathy\par \par The patient presents today with chronic right shoulder pain consistent with a clinical diagnosis of calcific tendinopathy. Radiographs show a calcification at the rotator cuff insertion consistent with chronic degenerative changes to the rotator cuff insertion. Clinically, the patient has specific point tenderness to this location as well as positive impingement signs. I discussed the pathophysiology of the diagnosis and treatment options including nonoperative management as first line treatment. Anti-inflammatory medications (NSAID's) with physical therapy for strengthening and conditioning of the joint to preserve shoulder range of motion is typically required. Corticosteroid injection may be indicated for refractory cases and for acute symptomatic pain relief. \par \par Recommendations: Begin trial of PT, Rx given. Conservative care & observation, this includes rest/activity avoidance until less symptomatic with subsequent gradual return to full activity as tolerated. Acetaminophen as tolerated, with application of ice to the area 2-3x daily for 20 minutes after periods of activity. \par \par Follow up as needed.

## 2023-03-09 NOTE — PHYSICAL EXAM
[de-identified] : Oriented to time, place, person\par Mood: Normal\par Affect: Normal\par Appearance: Healthy, well appearing, no acute distress.\par Gait: Normal\par Assistive Devices: None\par \par Right shoulder exam:\par \par Inspection: No malalignment, No defects, No atrophy\par Skin: No masses, No lesions\par Neck: Negative Spurling, full ROM, no pain with ROM\par AROM: FF to 180, abduction to 90, ER to 60, IR to mid lumbar\par Painful arc ROM: none\par Tenderness: No bicipital tenderness, no tenderness to greater tuberosity/RTC insertion, + anterior shoulder/lesser tuberosity tenderness\par Strength: 5/5 ER, 5/5 IR in adduction, 5/5 supraspinatus testing, negative Wetzel's test\par AC joint: No TTP/pain with cross arm testing\par Biceps: Speed Negative, Yergason Negative \par Impingement test: + Ramachandran, + Neer\par Vasc: 2+ radial pulse \par Stability: Stable \par Neuro: AIN, PIN, Ulnar nerve intact to motor\par Sensation: Intact to light touch throughout  [de-identified] : The following radiographs were ordered and read by me during this patients visit. I reviewed each radiograph in detail with the patient and discussed the findings as highlighted below.\par \par 3 views of right shoulder were obtained today, 03/07/2023, that show no acute fracture or dislocation. There is no glenohumeral and no AC joint degenerative change seen. Type II acromion. There is no significant malalignment. Evidence of calcific tendinitis.

## 2023-03-31 ENCOUNTER — RX RENEWAL (OUTPATIENT)
Age: 73
End: 2023-03-31

## 2023-04-05 ENCOUNTER — APPOINTMENT (OUTPATIENT)
Dept: INTERNAL MEDICINE | Facility: CLINIC | Age: 73
End: 2023-04-05
Payer: MEDICARE

## 2023-04-05 ENCOUNTER — INPATIENT (INPATIENT)
Facility: HOSPITAL | Age: 73
LOS: 5 days | Discharge: HOME | DRG: 314 | End: 2023-04-11
Attending: INTERNAL MEDICINE | Admitting: HOSPITALIST
Payer: MEDICARE

## 2023-04-05 ENCOUNTER — NON-APPOINTMENT (OUTPATIENT)
Age: 73
End: 2023-04-05

## 2023-04-05 VITALS
SYSTOLIC BLOOD PRESSURE: 152 MMHG | DIASTOLIC BLOOD PRESSURE: 75 MMHG | TEMPERATURE: 99 F | RESPIRATION RATE: 16 BRPM | HEART RATE: 81 BPM | WEIGHT: 136.03 LBS

## 2023-04-05 VITALS — TEMPERATURE: 99 F

## 2023-04-05 VITALS — DIASTOLIC BLOOD PRESSURE: 86 MMHG | SYSTOLIC BLOOD PRESSURE: 168 MMHG | OXYGEN SATURATION: 96 % | HEART RATE: 89 BPM

## 2023-04-05 DIAGNOSIS — R07.9 CHEST PAIN, UNSPECIFIED: ICD-10-CM

## 2023-04-05 DIAGNOSIS — N17.9 ACUTE KIDNEY FAILURE, UNSPECIFIED: ICD-10-CM

## 2023-04-05 DIAGNOSIS — E87.1 HYPO-OSMOLALITY AND HYPONATREMIA: ICD-10-CM

## 2023-04-05 DIAGNOSIS — I25.10 ATHEROSCLEROTIC HEART DISEASE OF NATIVE CORONARY ARTERY W/OUT ANGINA PECTORIS: ICD-10-CM

## 2023-04-05 DIAGNOSIS — E11.9 TYPE 2 DIABETES MELLITUS WITHOUT COMPLICATIONS: ICD-10-CM

## 2023-04-05 DIAGNOSIS — I31.9 DISEASE OF PERICARDIUM, UNSPECIFIED: ICD-10-CM

## 2023-04-05 DIAGNOSIS — Z29.9 ENCOUNTER FOR PROPHYLACTIC MEASURES, UNSPECIFIED: ICD-10-CM

## 2023-04-05 DIAGNOSIS — I10 ESSENTIAL (PRIMARY) HYPERTENSION: ICD-10-CM

## 2023-04-05 DIAGNOSIS — I48.91 UNSPECIFIED ATRIAL FIBRILLATION: ICD-10-CM

## 2023-04-05 DIAGNOSIS — I25.10 ATHEROSCLEROTIC HEART DISEASE OF NATIVE CORONARY ARTERY WITHOUT ANGINA PECTORIS: ICD-10-CM

## 2023-04-05 DIAGNOSIS — R53.1 WEAKNESS: ICD-10-CM

## 2023-04-05 DIAGNOSIS — D50.9 IRON DEFICIENCY ANEMIA, UNSPECIFIED: ICD-10-CM

## 2023-04-05 LAB
ALBUMIN SERPL ELPH-MCNC: 2.8 G/DL — LOW (ref 3.3–5)
ALP SERPL-CCNC: 159 U/L — HIGH (ref 40–120)
ALT FLD-CCNC: 17 U/L — SIGNIFICANT CHANGE UP (ref 10–45)
ANION GAP SERPL CALC-SCNC: 12 MMOL/L — SIGNIFICANT CHANGE UP (ref 5–17)
APTT BLD: 32.8 SEC — SIGNIFICANT CHANGE UP (ref 27.5–35.5)
AST SERPL-CCNC: 23 U/L — SIGNIFICANT CHANGE UP (ref 10–40)
BASOPHILS # BLD AUTO: 0.04 K/UL — SIGNIFICANT CHANGE UP (ref 0–0.2)
BASOPHILS NFR BLD AUTO: 0.3 % — SIGNIFICANT CHANGE UP (ref 0–2)
BILIRUB SERPL-MCNC: 0.2 MG/DL — SIGNIFICANT CHANGE UP (ref 0.2–1.2)
BUN SERPL-MCNC: 13 MG/DL — SIGNIFICANT CHANGE UP (ref 7–23)
CALCIUM SERPL-MCNC: 8.5 MG/DL — SIGNIFICANT CHANGE UP (ref 8.4–10.5)
CHLORIDE SERPL-SCNC: 94 MMOL/L — LOW (ref 96–108)
CO2 SERPL-SCNC: 22 MMOL/L — SIGNIFICANT CHANGE UP (ref 22–31)
CREAT SERPL-MCNC: 1.1 MG/DL — SIGNIFICANT CHANGE UP (ref 0.5–1.3)
CRP SERPL-MCNC: 286 MG/L — HIGH (ref 0–4)
EGFR: 53 ML/MIN/1.73M2 — LOW
EOSINOPHIL # BLD AUTO: 0.09 K/UL — SIGNIFICANT CHANGE UP (ref 0–0.5)
EOSINOPHIL NFR BLD AUTO: 0.7 % — SIGNIFICANT CHANGE UP (ref 0–6)
GLUCOSE SERPL-MCNC: 207 MG/DL — HIGH (ref 70–99)
HCT VFR BLD CALC: 28.1 % — LOW (ref 34.5–45)
HGB BLD-MCNC: 8.6 G/DL — LOW (ref 11.5–15.5)
IMM GRANULOCYTES NFR BLD AUTO: 0.7 % — SIGNIFICANT CHANGE UP (ref 0–0.9)
INR BLD: 1.21 RATIO — HIGH (ref 0.88–1.16)
LYMPHOCYTES # BLD AUTO: 1.54 K/UL — SIGNIFICANT CHANGE UP (ref 1–3.3)
LYMPHOCYTES # BLD AUTO: 11.5 % — LOW (ref 13–44)
MAGNESIUM SERPL-MCNC: 1.6 MG/DL — SIGNIFICANT CHANGE UP (ref 1.6–2.6)
MCHC RBC-ENTMCNC: 24.3 PG — LOW (ref 27–34)
MCHC RBC-ENTMCNC: 30.6 GM/DL — LOW (ref 32–36)
MCV RBC AUTO: 79.4 FL — LOW (ref 80–100)
MONOCYTES # BLD AUTO: 1.41 K/UL — HIGH (ref 0–0.9)
MONOCYTES NFR BLD AUTO: 10.5 % — SIGNIFICANT CHANGE UP (ref 2–14)
NEUTROPHILS # BLD AUTO: 10.24 K/UL — HIGH (ref 1.8–7.4)
NEUTROPHILS NFR BLD AUTO: 76.3 % — SIGNIFICANT CHANGE UP (ref 43–77)
NRBC # BLD: 0 /100 WBCS — SIGNIFICANT CHANGE UP (ref 0–0)
NT-PROBNP SERPL-SCNC: 2285 PG/ML — HIGH (ref 0–300)
PLATELET # BLD AUTO: 385 K/UL — SIGNIFICANT CHANGE UP (ref 150–400)
POTASSIUM SERPL-MCNC: 4.3 MMOL/L — SIGNIFICANT CHANGE UP (ref 3.5–5.3)
POTASSIUM SERPL-SCNC: 4.3 MMOL/L — SIGNIFICANT CHANGE UP (ref 3.5–5.3)
PROT SERPL-MCNC: 6.8 G/DL — SIGNIFICANT CHANGE UP (ref 6–8.3)
PROTHROM AB SERPL-ACNC: 14.1 SEC — HIGH (ref 10.5–13.4)
RAPID RVP RESULT: SIGNIFICANT CHANGE UP
RBC # BLD: 3.54 M/UL — LOW (ref 3.8–5.2)
RBC # FLD: 16.9 % — HIGH (ref 10.3–14.5)
SARS-COV-2 RNA SPEC QL NAA+PROBE: SIGNIFICANT CHANGE UP
SARS-COV-2 RNA SPEC QL NAA+PROBE: SIGNIFICANT CHANGE UP
SODIUM SERPL-SCNC: 128 MMOL/L — LOW (ref 135–145)
TROPONIN T, HIGH SENSITIVITY RESULT: 23 NG/L — SIGNIFICANT CHANGE UP (ref 0–51)
TROPONIN T, HIGH SENSITIVITY RESULT: 24 NG/L — SIGNIFICANT CHANGE UP (ref 0–51)
WBC # BLD: 13.41 K/UL — HIGH (ref 3.8–10.5)
WBC # FLD AUTO: 13.41 K/UL — HIGH (ref 3.8–10.5)

## 2023-04-05 PROCEDURE — 99223 1ST HOSP IP/OBS HIGH 75: CPT | Mod: GC

## 2023-04-05 PROCEDURE — 93000 ELECTROCARDIOGRAM COMPLETE: CPT

## 2023-04-05 PROCEDURE — 93306 TTE W/DOPPLER COMPLETE: CPT | Mod: 26

## 2023-04-05 PROCEDURE — 99285 EMERGENCY DEPT VISIT HI MDM: CPT

## 2023-04-05 PROCEDURE — 71275 CT ANGIOGRAPHY CHEST: CPT | Mod: 26,MA

## 2023-04-05 PROCEDURE — 99215 OFFICE O/P EST HI 40 MIN: CPT | Mod: 25

## 2023-04-05 PROCEDURE — 71045 X-RAY EXAM CHEST 1 VIEW: CPT | Mod: 26

## 2023-04-05 RX ORDER — HYDRALAZINE HCL 50 MG
50 TABLET ORAL THREE TIMES A DAY
Refills: 0 | Status: DISCONTINUED | OUTPATIENT
Start: 2023-04-05 | End: 2023-04-06

## 2023-04-05 RX ORDER — METOPROLOL TARTRATE 50 MG
5 TABLET ORAL ONCE
Refills: 0 | Status: COMPLETED | OUTPATIENT
Start: 2023-04-05 | End: 2023-04-05

## 2023-04-05 RX ORDER — SIMVASTATIN 20 MG/1
40 TABLET, FILM COATED ORAL AT BEDTIME
Refills: 0 | Status: DISCONTINUED | OUTPATIENT
Start: 2023-04-05 | End: 2023-04-10

## 2023-04-05 RX ORDER — METOPROLOL TARTRATE 50 MG
50 TABLET ORAL
Refills: 0 | Status: DISCONTINUED | OUTPATIENT
Start: 2023-04-05 | End: 2023-04-05

## 2023-04-05 RX ORDER — INSULIN LISPRO 100/ML
VIAL (ML) SUBCUTANEOUS AT BEDTIME
Refills: 0 | Status: DISCONTINUED | OUTPATIENT
Start: 2023-04-05 | End: 2023-04-06

## 2023-04-05 RX ORDER — HEPARIN SODIUM 5000 [USP'U]/ML
2500 INJECTION INTRAVENOUS; SUBCUTANEOUS EVERY 6 HOURS
Refills: 0 | Status: DISCONTINUED | OUTPATIENT
Start: 2023-04-05 | End: 2023-04-08

## 2023-04-05 RX ORDER — GABAPENTIN 400 MG/1
300 CAPSULE ORAL THREE TIMES A DAY
Refills: 0 | Status: DISCONTINUED | OUTPATIENT
Start: 2023-04-05 | End: 2023-04-11

## 2023-04-05 RX ORDER — COLCHICINE 0.6 MG
0.6 TABLET ORAL ONCE
Refills: 0 | Status: COMPLETED | OUTPATIENT
Start: 2023-04-05 | End: 2023-04-05

## 2023-04-05 RX ORDER — ASPIRIN/CALCIUM CARB/MAGNESIUM 324 MG
650 TABLET ORAL EVERY 8 HOURS
Refills: 0 | Status: DISCONTINUED | OUTPATIENT
Start: 2023-04-05 | End: 2023-04-07

## 2023-04-05 RX ORDER — FERROUS SULFATE 325(65) MG
325 TABLET ORAL
Refills: 0 | Status: DISCONTINUED | OUTPATIENT
Start: 2023-04-05 | End: 2023-04-11

## 2023-04-05 RX ORDER — HEPARIN SODIUM 5000 [USP'U]/ML
5000 INJECTION INTRAVENOUS; SUBCUTANEOUS EVERY 6 HOURS
Refills: 0 | Status: DISCONTINUED | OUTPATIENT
Start: 2023-04-05 | End: 2023-04-08

## 2023-04-05 RX ORDER — INSULIN LISPRO 100/ML
VIAL (ML) SUBCUTANEOUS
Refills: 0 | Status: DISCONTINUED | OUTPATIENT
Start: 2023-04-05 | End: 2023-04-06

## 2023-04-05 RX ORDER — ASPIRIN/CALCIUM CARB/MAGNESIUM 324 MG
325 TABLET ORAL ONCE
Refills: 0 | Status: COMPLETED | OUTPATIENT
Start: 2023-04-05 | End: 2023-04-05

## 2023-04-05 RX ORDER — HEPARIN SODIUM 5000 [USP'U]/ML
1100 INJECTION INTRAVENOUS; SUBCUTANEOUS
Qty: 25000 | Refills: 0 | Status: DISCONTINUED | OUTPATIENT
Start: 2023-04-05 | End: 2023-04-06

## 2023-04-05 RX ORDER — SODIUM CHLORIDE 9 MG/ML
1000 INJECTION INTRAMUSCULAR; INTRAVENOUS; SUBCUTANEOUS
Refills: 0 | Status: DISCONTINUED | OUTPATIENT
Start: 2023-04-05 | End: 2023-04-06

## 2023-04-05 RX ORDER — ASPIRIN/CALCIUM CARB/MAGNESIUM 324 MG
650 TABLET ORAL EVERY 8 HOURS
Refills: 0 | Status: DISCONTINUED | OUTPATIENT
Start: 2023-04-05 | End: 2023-04-05

## 2023-04-05 RX ORDER — COLCHICINE 0.6 MG
0.6 TABLET ORAL EVERY 12 HOURS
Refills: 0 | Status: DISCONTINUED | OUTPATIENT
Start: 2023-04-05 | End: 2023-04-11

## 2023-04-05 RX ORDER — PANTOPRAZOLE SODIUM 20 MG/1
40 TABLET, DELAYED RELEASE ORAL
Refills: 0 | Status: DISCONTINUED | OUTPATIENT
Start: 2023-04-05 | End: 2023-04-11

## 2023-04-05 RX ADMIN — Medication 5 MILLIGRAM(S): at 21:14

## 2023-04-05 RX ADMIN — Medication 0.6 MILLIGRAM(S): at 18:26

## 2023-04-05 RX ADMIN — Medication 325 MILLIGRAM(S): at 18:26

## 2023-04-05 RX ADMIN — HEPARIN SODIUM 1100 UNIT(S)/HR: 5000 INJECTION INTRAVENOUS; SUBCUTANEOUS at 22:38

## 2023-04-05 RX ADMIN — Medication 650 MILLIGRAM(S): at 22:07

## 2023-04-05 RX ADMIN — Medication 5 MILLIGRAM(S): at 22:07

## 2023-04-05 RX ADMIN — SODIUM CHLORIDE 75 MILLILITER(S): 9 INJECTION INTRAMUSCULAR; INTRAVENOUS; SUBCUTANEOUS at 22:44

## 2023-04-05 NOTE — H&P ADULT - NSHPREVIEWOFSYSTEMS_GEN_ALL_CORE
REVIEW OF SYSTEMS:    CONSTITUTIONAL: No weakness, fevers or chills  EYES/ENT: No visual changes;  No vertigo or throat pain   NECK: No pain or stiffness  RESPIRATORY: +shortness of breath  CARDIOVASCULAR: +chest pain  GASTROINTESTINAL: No abdominal or epigastric pain. No nausea, vomiting, or hematemesis; No diarrhea or constipation. No melena or hematochezia.  GENITOURINARY: No dysuria, frequency or hematuria  NEUROLOGICAL: No numbness or weakness  SKIN: No itching, rashes

## 2023-04-05 NOTE — H&P ADULT - PROBLEM SELECTOR PLAN 5
Sodium 128 on admission, suspect in setting of poor PO intake.   f/u urine studies  started on 75cc/hr NS Sodium 128 on admission, suspect in setting of poor PO intake.   f/u urine studies  started on 100cc/hr NS

## 2023-04-05 NOTE — H&P ADULT - PROBLEM SELECTOR PLAN 2
New onset this admission. DARRELL-Vasc 5. Rates up to 140 given IV lopressor 5 x2 rates now 120s.   Start heparin gtt  Continue with home metoprolol 50 BID  Obtain TSH New onset this admission. DARRELL-Vasc 5. Rates up to 140 given IV lopressor 5 x2 rates now 120s.   Hold off on further beta blockade since afib is probably compensatory, could consider digoxin or amiodarone if patient becomes hemodynamically unstable  Start heparin gtt, Cardiology aware  Continue with metoprolol 12.5 BID for now per conversation with Cardiology  Obtain TSH

## 2023-04-05 NOTE — ED PROVIDER NOTE - ATTENDING APP SHARED VISIT CONTRIBUTION OF CARE
72yD h/o CAD s/p Stent to RPDA 2019, HTN, HLD, DM2 (on oral meds), p/w pleuritic, "sticking" chest pain x 3-4 days a/w SOB. Assoc with bilateral neck and back pain. Was given ASA x 4 by EMS.   Of note, pt states last week she felt sick with a "cold" and reports subj fever which has resolved since. Exam as above. Differential diagnosis including, but not limited to, PE, pneumonia, pericarditis, ACS, pericardial effusion, pleurisy. Will obtain ekg, place on tele, labs including trop and proBNP, cxr, CTA chest, reassess

## 2023-04-05 NOTE — ED ADULT NURSE NOTE - OBJECTIVE STATEMENT
Pt 73 y/o female, AxOx3, presents ED from PCP office via EMS for chest pain. Pt reporting near syncopal episode yesterday, reporting waking up on floor with plate of food spilled. Presented to PCP office today where she was then prompted to be seen in ED for eval. Pt reporting mid sternal chest pain associated with back pain, r/l chest wall pain. Pt is well appearing, speaking full sentences without difficulty. Breathing spontaneous and unlabored. Upon assessment, abdomen soft and nontender, +strong peripheral pulses, moving all extremities without difficulty, lungs clear. Pt placed on continuous pulse ox and cardiac monitor, NSR noted. Safety and comfort measures initiated- bed placed in lowest position and side rails raised. Pt oriented to call bell system. Pt 73 y/o female, AxOx3, presents ED from PCP office via EMS for chest pain. Pt reporting near syncopal episode yesterday, reporting waking up on floor with plate of food spilled. Presented to PCP office today where she was then prompted to be seen in ED for eval. Pt reporting mid sternal chest pain associated with back pain, r/l chest wall pain. Pt is well appearing, speaking few words at a time. Breathing spontaneous and unlabored but mildly tachypneic. Pt denies SOB. Upon assessment, abdomen soft and nontender, +strong peripheral pulses, moving all extremities without difficulty, lungs clear. Pt placed on continuous pulse ox and cardiac monitor, NSR noted. Safety and comfort measures initiated- bed placed in lowest position and side rails raised. Pt oriented to call bell system.

## 2023-04-05 NOTE — PHYSICAL EXAM
[Normal Rate] : normal rate  [Regular Rhythm] : with a regular rhythm [Normal S1, S2] : normal S1 and S2 [de-identified] : Ill-appearing female in moderate distress.  Having trouble completing sentences. [de-identified] : Mild respiratory distress, essentially clear but able to hear heart sounds posteriorly

## 2023-04-05 NOTE — HISTORY OF PRESENT ILLNESS
[FreeTextEntry8] : 72-year-old female with a history of diabetes, CAD status post stent in the past, neuro aortic thrombus for which no anticoagulation was felt to be necessary in the past, hypertension who comes in today with 3 days of not feeling well.  Has been having shoulder pain for the last 3 weeks but over the last 3 days has developed anterior chest pain with radiation to the shoulders and around to the back.  This is associated with shortness of breath and weakness.  As an aside she then told me that she passed out yesterday while she was eating.  Found herself on the floor.  Thought she was going to die but did not seek help at that time.

## 2023-04-05 NOTE — H&P ADULT - ATTENDING COMMENTS
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 Appreciate cardiology recommendations. POCUS more suggestive for moderate effusion w/o signs of tamponade physiology. Pt developed new onset afib w/ rvr during admission. Discussed w/ cardiology will begin heparin gtt for now.  -Cont. high dose asa and colchicine for pericarditis for now  -Possible viral cause of pericarditis and pericardial effusion? Pt's chest pain seems pleuritic in nature  -Trend PTT, vital signs frequently  -Obtain dedicated echo  -Telemetry  -Holding antihypertensives for now, only low dose BB for afib for now. HR has been 110s to 130s.  -PPI prophylaxis  -F/u cardiology recommendations  -Will have low threshold to reconsult cardiology with any hemodynamic instability  -Keep NPO for now  -Gentle IVF overnight

## 2023-04-05 NOTE — H&P ADULT - PROBLEM SELECTOR PLAN 1
Recent viral illness at [facility] Recent viral illness last week? Now with worsening chest pain, concern for pericarditis after recent URI.   s/p aspirin and colchicine  Cardiology following  Obtain stat ECHO to rule out tamponade physiology  Continue with  TID and Colchicine 0.6 BID, could transition to steroids if concerned about kidney function.  Obtain ESR, CRP Recent viral illness last week? Now with worsening chest pain, concern for pericarditis after recent URI.   s/p aspirin and colchicine  Cardiology following  Obtain stat ECHO to rule out tamponade physiology  Continue with  TID and Colchicine 0.6 BID, could transition to steroids if concerned about kidney function.  Obtain ESR, CRP  Daughter Carmen (RN in [place]) [phone] requests update in AM, Also HCP as per patient.

## 2023-04-05 NOTE — H&P ADULT - NSHPLABSRESULTS_GEN_ALL_CORE
LABS:  cret                        8.6    13.41 )-----------( 385      ( 05 Apr 2023 14:33 )             28.1     04-05    128<L>  |  94<L>  |  13  ----------------------------<  207<H>  4.3   |  22  |  1.10    Ca    8.5      05 Apr 2023 14:33  Mg     1.6     04-05    TPro  6.8  /  Alb  2.8<L>  /  TBili  0.2  /  DBili  x   /  AST  23  /  ALT  17  /  AlkPhos  159<H>  04-05    PT/INR - ( 05 Apr 2023 21:55 )   PT: 14.1 sec;   INR: 1.21 ratio         PTT - ( 05 Apr 2023 21:55 )  PTT:32.8 sec LABS:  cret                        8.6    13.41 )-----------( 385      ( 05 Apr 2023 14:33 )             28.1     04-05    128<L>  |  94<L>  |  13  ----------------------------<  207<H>  4.3   |  22  |  1.10    Ca    8.5      05 Apr 2023 14:33  Mg     1.6     04-05    TPro  6.8  /  Alb  2.8<L>  /  TBili  0.2  /  DBili  x   /  AST  23  /  ALT  17  /  AlkPhos  159<H>  04-05    PT/INR - ( 05 Apr 2023 21:55 )   PT: 14.1 sec;   INR: 1.21 ratio       PTT - ( 05 Apr 2023 21:55 )  PTT:32.8 sec    I have reviewed the labs, imaging and ekg. EKG with NSR HR 82 QTc 427 non-specific ST segment findings, significant motion artifact, CXR w/ L pleural effusion and cardiomegaly

## 2023-04-05 NOTE — ED PROVIDER NOTE - NS ED ATTENDING STATEMENT MOD
This was a shared visit with the BARI. I reviewed and verified the documentation and independently performed the documented:

## 2023-04-05 NOTE — CONSULT NOTE ADULT - ATTENDING COMMENTS
72 year old woman with prior coronary stent, presents with pleuritic chest pain, moderate pericardial effusion on echo, treating for pericarditis. Since admission developed atrial fibrillation with rapid ventricular response. Echocardiogram indicates normal LV systolic function, no regional wall motion abnormality, moderate pericardial effusion, but no signs of hemodynamic compromise, no tamponade physiology. Need rate control of atrial fibrillation, was on metoprolol tartrate 50 mg BID and this should be resumed. Started on heparin infusion for atrial fibrillation. On aspirin now and should stop clopidogrel.    To contact call Cardiology Fellow or Attending as listed on [email or web address] password: [email or web address].

## 2023-04-05 NOTE — ED PROVIDER NOTE - PHYSICAL EXAMINATION
CONSTITUTIONAL: Well appearing and in no apparent distress.  ENT: Airway patent, moist mucous membranes.   EYES: Pupils equal, round and reactive to light. EOMI. Conjunctiva normal appearing.   CARDIAC: Normal rate, regular rhythm.  Heart sounds S1, S2.    RESPIRATORY: Breath sounds clear and equal bilaterally.   CHEST: No lesions or rash.   GASTROINTESTINAL: Abdomen soft, non-tender, not distended.  MUSCULOSKELETAL: Spine appears normal. No LE swelling/edema.   NEUROLOGICAL: Alert and oriented x3, no focal deficits, no motor or sensory deficits. 5/5 muscle strength throughout.  SKIN: Skin normal color, warm, dry and intact.   PSYCHIATRIC: Normal mood and affect.

## 2023-04-05 NOTE — ED PROVIDER NOTE - ATTENDING SHARED VISIT SELECTORS
Received notification from [facility] that med Humira has been approved through 10/14/19. [facility] will contact pt to setup shipment. Pt is aware. History/Exam/Medical Decision Making

## 2023-04-05 NOTE — ED PROVIDER NOTE - PROGRESS NOTE DETAILS
Wet read CTA chest revealed large pericardial effusion  Called cards fellow in the middle of resuscitating unstable pt  Will call back. STAT ECHO ordered for further eval  Harmeet Nguyen PA-C Cards aware, will come eval  Pt HDS, no clinical signs of tamponade  Harmeet Nguyen PA-C

## 2023-04-05 NOTE — H&P ADULT - HISTORY OF PRESENT ILLNESS
72-year-old female with a history of diabetes, CAD s/p stent (2019), neuro aortic thrombus for which no anticoagulation was felt to be necessary in the past, hypertension, and type 2 diabetes who is presenting with acute worsening chest pain over the past 3 days. She reports chest pressure ongoing for 2 weeks with subjective fever last week. Also having poor intake during this time. Pain is located anteriorly and radiates to her sides. Worsens when she takes a deep breath or lies flat. Improves when she sits up and leans forward. Patient also reports losing consciousness 2 days ago at home, unwitnessed, no fall. Saw PCP today who gave the patient 4 aspirin and sent her to ED. Denies lightheadedness, palpitations, abdominal pain, nausea, vomiting, changes in urination or bowel movement, or leg swelling. No recent changes to medications, reports compliance with current regimen.     In ED, vitals rectal temperature 100.3, tachypnea, normotensive, HR 90. Labs significant for WBC 13.4, hemoglobin 8.6, sodium 128, pro-BNP 2285. Imaging significant for CTA chest with large pericardial effusion, no pulmonary embolism. Seen by Cardiology in ED, low concern for tamponade. Received aspirin 325 and colchicine in ED.

## 2023-04-05 NOTE — H&P ADULT - PROBLEM SELECTOR PLAN 3
SCr baseline 0.3-0.5  Now with SCr 1.10  Obtain urine studies SCr baseline 0.3-0.5  Now with SCr 1.10  Obtain urine studies  Careful monitoring while on NSAIDs

## 2023-04-05 NOTE — ED PROVIDER NOTE - OBJECTIVE STATEMENT
71 yo F with a PMH of CAD s/p Stent to RPDA 2019, HTN, HLD, DM2 (on oral meds), follows with Cards- Dr. Mirza (last saw him 3 mo ago) p/w pleuritic chest pain x 3-4 days a/w SOB. States she feels a "sticking" pain in her chest that worsens when she takes a deep breath. Sxs constant since onset and now associated with bilateral neck and back pain. Went to see her PMD today for eval and they called EMS to bring her to ED. Pt states she never had pain like this before. Did not have chest pain at the time she had LHC, at that time she had presented for routine cardiac evaluation and had scheduled cath. Has been taking Tylenol w/o relief. Was given ASA x 4 by EMS. Denies nausea, vomiting, fever, chills, cough, palpitations, leg pain/swelling, abd pan, headache, dizziness, syncope. Of note, pt states last week she felt sick with a "cold" and reports subj fever which has resolved since.

## 2023-04-05 NOTE — CONSULT NOTE ADULT - SUBJECTIVE AND OBJECTIVE BOX
Patient seen and evaluated at bedside    Chief Complaint:     HPI:    73 yo F with a PMH of CAD s/p Stent to RPDA 2019, HTN, HLD, DM2 (on oral meds), follows with Cards- Dr. Mirza (last saw him 3 mo ago) p/w pleuritic chest pain x 3-4 days a/w SOB. States she feels a "sticking" pain in her chest that worsens when she takes a deep breath. Sxs constant since onset and now associated with bilateral neck and back pain. Went to see her PMD today for eval and they called EMS to bring her to ED. Pt states she never had pain like this before. Did not have chest pain at the time she had LHC, at that time she had presented for routine cardiac evaluation and had scheduled cath. Has been taking Tylenol w/o relief. Was given ASA x 4 by EMS. Denies nausea, vomiting, fever, chills, cough, palpitations, leg pain/swelling, abd pan, headache, dizziness, syncope. Of note, pt states last week she felt sick with a "cold" and reports subj fever which has resolved since.        PMHx:   Hypertension    Hyperlipidemia    Diabetes    CAD (coronary artery disease)        PSHx:   No significant past surgical history        Allergies:  felodipine (Unknown)      Home Meds:  Hypertension    Hyperlipidemia    Diabetes    CAD (coronary artery disease)        Current Medications:       FAMILY HISTORY:  Family history of cerebrovascular accident (CVA)        Social History:  Smoking History:  Alcohol Use:  Drug Use:    REVIEW OF SYSTEMS:  Constitutional:     [ ] negative [ ] fevers [ ] chills [ ] weight loss [ ] weight gain  HEENT:                  [ ] negative [ ] dry eyes [ ] eye irritation [ ] postnasal drip [ ] nasal congestion  CV:                         [ ] negative  [ ] chest pain [ ] orthopnea [ ] palpitations [ ] murmur  Resp:                     [ ] negative [ ] cough [ ] shortness of breath [ ] dyspnea [ ] wheezing [ ] sputum [ ]hemoptysis  GI:                          [ ] negative [ ] nausea [ ] vomiting [ ] diarrhea [ ] constipation [ ] abd pain [ ] dysphagia   :                        [ ] negative [ ] dysuria [ ] nocturia [ ] hematuria [ ] increased urinary frequency  Musculoskeletal: [ ] negative [ ] back pain [ ] myalgias [ ] arthralgias [ ] fracture  Skin:                       [ ] negative [ ] rash [ ] itch  Neurological:        [ ] negative [ ] headache [ ] dizziness [ ] syncope [ ] weakness [ ] numbness  Psychiatric:           [ ] negative [ ] anxiety [ ] depression  Endocrine:            [ ] negative [ ] diabetes [ ] thyroid problem  Heme/Lymph:      [ ] negative [ ] anemia [ ] bleeding problem  Allergic/Immune: [ ] negative [ ] itchy eyes [ ] nasal discharge [ ] hives [ ] angioedema    [ ] All other systems negative  [ ] Unable to assess ROS due to      Physical Exam:  T(F): 99 (04-05), Max: 100.3 (04-05)  HR: 70 (04-05) (70 - 90)  BP: 149/70 (04-05) (122/80 - 152/75)  RR: 20 (04-05)  SpO2: 98% (04-05)  GENERAL: No acute distress, well-developed  HEAD:  Atraumatic, Normocephalic  ENT: EOMI, PERRLA, conjunctiva and sclera clear, Neck supple, No JVD, moist mucosa  CHEST/LUNG: Clear to auscultation bilaterally; No wheeze, equal breath sounds bilaterally   BACK: No spinal tenderness  HEART: Regular rate and rhythm; No murmurs, rubs, or gallops  ABDOMEN: Soft, Nontender, Nondistended; Bowel sounds present  EXTREMITIES:  No clubbing, cyanosis, or edema  PSYCH: Nl behavior, nl affect  NEUROLOGY: AAOx3, non-focal, cranial nerves intact  SKIN: Normal color, No rashes or lesions  LINES:    Cardiovascular Diagnostic Testing:    ECG: Personally reviewed:    Echo: Personally reviewed:    Stress Testing:    Cath:    Imaging:    CXR: Personally reviewed    Labs: Personally reviewed                        8.6    13.41 )-----------( 385      ( 05 Apr 2023 14:33 )             28.1     04-05    128<L>  |  94<L>  |  13  ----------------------------<  207<H>  4.3   |  22  |  1.10    Ca    8.5      05 Apr 2023 14:33  Mg     1.6     04-05    TPro  6.8  /  Alb  2.8<L>  /  TBili  0.2  /  DBili  x   /  AST  23  /  ALT  17  /  AlkPhos  159<H>  04-05                CARDIAC MARKERS ( 05 Apr 2023 16:54 )  24 ng/L / x     / x     / x     / x     / x      CARDIAC MARKERS ( 05 Apr 2023 14:33 )  23 ng/L / x     / x     / x     / x     / x          felodipine (Unknown)      T(F): 99 (04-05), Max: 100.3 (04-05)  HR: 70 (04-05) (70 - 90)  BP: 149/70 (04-05) (122/80 - 152/75)  RR: 20 (04-05)  SpO2: 98% (04-05) Patient seen and evaluated at bedside    Chief Complaint:     HPI:    73 yo F with a PMH of CAD s/p Stent to RPDA 2019, HTN, HLD, DM2 (on oral meds), follows with Cards- Dr. Pearce (last saw him 3 mo ago) p/w pleuritic chest pain x 3-4 days a/w SOB. States she feels a "sticking" pain in her chest that worsens when she takes a deep breath. Sxs constant since onset and now associated with bilateral neck and back pain. Went to see her PMD today for eval and they called EMS to bring her to ED. Pt states she never had pain like this before. Did not have chest pain at the time she had LHC, at that time she had presented for routine cardiac evaluation and had scheduled cath. Has been taking Tylenol w/o relief. Was given ASA x 4 by EMS. Denies nausea, vomiting, fever, chills, cough, palpitations, leg pain/swelling, abd pan, headache, dizziness, syncope. Of note, pt states last week she felt sick with a "cold" and reports subj fever which has resolved since.        PMHx:   Hypertension    Hyperlipidemia    Diabetes    CAD (coronary artery disease)        PSHx:   No significant past surgical history        Allergies:  felodipine (Unknown)      Home Meds:  · 	clopidogrel 75 mg oral tablet: 1 tab(s) orally once a day  · 	aspirin 81 mg oral delayed release tablet: 1 tab(s) orally once a day  · 	metFORMIN 500 mg oral tablet: 1 tab(s) orally 3 times a day  	Hold 48 hrs, resume 9/9/22  · 	hydrALAZINE 50 mg oral tablet: 1 tab(s) orally 2 times a day  · 	cloNIDine 0.1 mg oral tablet: 1 tab(s) orally 2 times a day  · 	enalapril 20 mg oral tablet: 1 tab(s) orally 2 times a day  · 	simvastatin 40 mg oral tablet: 1 tab(s) orally once a day (at bedtime)  · 	gabapentin 300 mg oral capsule: 1 cap(s) orally 3 times a day  · 	metoprolol tartrate 50 mg oral tablet: 1 tab(s) orally 2 times a day  · 	pantoprazole 40 mg oral delayed release tablet: 1 tab(s) orally once a day      Current Medications:       FAMILY HISTORY:  Family history of cerebrovascular accident (CVA)        Social History:  Smoking History:  Alcohol Use:  Drug Use:    REVIEW OF SYSTEMS:  Constitutional:     [ ] negative [ ] fevers [ ] chills [ ] weight loss [ ] weight gain  HEENT:                  [ ] negative [ ] dry eyes [ ] eye irritation [ ] postnasal drip [ ] nasal congestion  CV:                         [ ] negative  [ ] chest pain [ ] orthopnea [ ] palpitations [ ] murmur  Resp:                     [ ] negative [ ] cough [ ] shortness of breath [ ] dyspnea [ ] wheezing [ ] sputum [ ]hemoptysis  GI:                          [ ] negative [ ] nausea [ ] vomiting [ ] diarrhea [ ] constipation [ ] abd pain [ ] dysphagia   :                        [ ] negative [ ] dysuria [ ] nocturia [ ] hematuria [ ] increased urinary frequency  Musculoskeletal: [ ] negative [ ] back pain [ ] myalgias [ ] arthralgias [ ] fracture  Skin:                       [ ] negative [ ] rash [ ] itch  Neurological:        [ ] negative [ ] headache [ ] dizziness [ ] syncope [ ] weakness [ ] numbness  Psychiatric:           [ ] negative [ ] anxiety [ ] depression  Endocrine:            [ ] negative [ ] diabetes [ ] thyroid problem  Heme/Lymph:      [ ] negative [ ] anemia [ ] bleeding problem  Allergic/Immune: [ ] negative [ ] itchy eyes [ ] nasal discharge [ ] hives [ ] angioedema    [ ] All other systems negative  [ ] Unable to assess ROS due to      Physical Exam:  T(F): 99 (04-05), Max: 100.3 (04-05)  HR: 70 (04-05) (70 - 90)  BP: 149/70 (04-05) (122/80 - 152/75)  RR: 20 (04-05)  SpO2: 98% (04-05)  GENERAL: No acute distress, well-developed  HEAD:  Atraumatic, Normocephalic  ENT: EOMI, PERRLA, conjunctiva and sclera clear, Neck supple, No JVD, moist mucosa  CHEST/LUNG: Clear to auscultation bilaterally; No wheeze, equal breath sounds bilaterally   BACK: No spinal tenderness  HEART: Regular rate and rhythm; No murmurs, rubs, or gallops  ABDOMEN: Soft, Nontender, Nondistended; Bowel sounds present  EXTREMITIES:  No clubbing, cyanosis, or edema  PSYCH: Nl behavior, nl affect  NEUROLOGY: AAOx3, non-focal, cranial nerves intact  SKIN: Normal color, No rashes or lesions  LINES:    Cardiovascular Diagnostic Testing:    ECG: Personally reviewed:    Echo: Personally reviewed:    Stress Testing:    Cath:    Imaging:    CXR: Personally reviewed    ACC: [id number] EXAM: CT ANGIO CHEST PULM [facility] ORDERED BY: MAKENZIE LUTZ    PROCEDURE DATE: 04/05/2023        INTERPRETATION: ACC: [id number]  INDICATION, CLINICAL INFORMATION: pleuritic chest pain  TECHNIQUE: CT pulmonary angiogram of the chest . Uneventful administration of 70 cc Omnipaque 350. Coronal, sagittal and 3-D/MIP images were reconstructed/reviewed.  COMPARISON: Cardiac CT 8/13/2019 chest CT.    FINDINGS:    PULMONARY VESSELS: No pulmonary embolus. Main pulmonary artery normal in diameter.    HEART AND VASCULATURE: Heart size is normal. Large pericardial effusion. No aortic aneurysm or dissection.    LUNGS, AIRWAYS, PLEURA: Patent central airways. Small left pleural effusion. Subsegmental atelectasis of left lower lobe and lingula.    MEDIASTINUM: No mass or lymphadenopathy.    UPPER ABDOMEN: Within normal limits.    BONES AND SOFT TISSUES: No aggressive osseous lesion.    LOWER NECK: Within normal limits.    IMPRESSION:    No pulmonary embolus.    Large pericardial effusion.    Small left pleural effusion and mild atelectasis at the left base.    These findings were discussed with MOISES LUTZ, on 4/5/2023 4:41 PM with read back.    Labs: Personally reviewed                        8.6    13.41 )-----------( 385      ( 05 Apr 2023 14:33 )             28.1     04-05    128<L>  |  94<L>  |  13  ----------------------------<  207<H>  4.3   |  22  |  1.10    Ca    8.5      05 Apr 2023 14:33  Mg     1.6     04-05    TPro  6.8  /  Alb  2.8<L>  /  TBili  0.2  /  DBili  x   /  AST  23  /  ALT  17  /  AlkPhos  159<H>  04-05                CARDIAC MARKERS ( 05 Apr 2023 16:54 )  24 ng/L / x     / x     / x     / x     / x      CARDIAC MARKERS ( 05 Apr 2023 14:33 )  23 ng/L / x     / x     / x     / x     / x          felodipine (Unknown)      T(F): 99 (04-05), Max: 100.3 (04-05)  HR: 70 (04-05) (70 - 90)  BP: 149/70 (04-05) (122/80 - 152/75)  RR: 20 (04-05)  SpO2: 98% (04-05) Patient seen and evaluated at bedside    Chief Complaint: chest pain    HPI:    71 yo F with a PMH of CAD s/p Stent to RPDA 2019, HTN, HLD, DM2 (on oral meds), follows with Cards- Dr. Pearce (last saw him 3 mo ago) p/w pleuritic chest pain x 3-4 days a/w SOB. States she feels a "sticking" pain in her chest that worsens when she takes a deep breath. The pain also worsens with laying back and improves with sitting up. Sxs constant since onset and now associated with bilateral neck and back pain. Went to see her PMD today for eval and they called EMS to bring her to ED. Pt states she never had pain like this before. Did not have chest pain at the time she had LHC, at that time she had presented for routine cardiac evaluation and had scheduled cath. Has been taking Tylenol w/o relief. Was given ASA x 4 by EMS. Denies nausea, vomiting, fever, chills, cough, palpitations, leg pain/swelling, abd pan, headache, dizziness, syncope. Of note, pt states last week she felt sick with a "cold" and reports subj fever which has resolved since.        PMHx:   Hypertension    Hyperlipidemia    Diabetes    CAD (coronary artery disease)        PSHx:   No significant past surgical history        Allergies:  felodipine (Unknown)      Home Meds:  · 	clopidogrel 75 mg oral tablet: 1 tab(s) orally once a day  · 	aspirin 81 mg oral delayed release tablet: 1 tab(s) orally once a day  · 	metFORMIN 500 mg oral tablet: 1 tab(s) orally 3 times a day  	Hold 48 hrs, resume 22  · 	hydrALAZINE 50 mg oral tablet: 1 tab(s) orally 2 times a day  · 	cloNIDine 0.1 mg oral tablet: 1 tab(s) orally 2 times a day  · 	enalapril 20 mg oral tablet: 1 tab(s) orally 2 times a day  · 	simvastatin 40 mg oral tablet: 1 tab(s) orally once a day (at bedtime)  · 	gabapentin 300 mg oral capsule: 1 cap(s) orally 3 times a day  · 	metoprolol tartrate 50 mg oral tablet: 1 tab(s) orally 2 times a day  · 	pantoprazole 40 mg oral delayed release tablet: 1 tab(s) orally once a day      Current Medications:       FAMILY HISTORY:  Family history of cerebrovascular accident (CVA)        Social History:  Smoking History:  Alcohol Use:  Drug Use:    REVIEW OF SYSTEMS:  Constitutional:     [ ] negative [ ] fevers [ ] chills [ ] weight loss [ ] weight gain  HEENT:                  [ ] negative [ ] dry eyes [ ] eye irritation [ ] postnasal drip [ ] nasal congestion  CV:                         [ ] negative  [ ] chest pain [ ] orthopnea [ ] palpitations [ ] murmur  Resp:                     [ ] negative [ ] cough [ ] shortness of breath [ ] dyspnea [ ] wheezing [ ] sputum [ ]hemoptysis  GI:                          [ ] negative [ ] nausea [ ] vomiting [ ] diarrhea [ ] constipation [ ] abd pain [ ] dysphagia   :                        [ ] negative [ ] dysuria [ ] nocturia [ ] hematuria [ ] increased urinary frequency  Musculoskeletal: [ ] negative [ ] back pain [ ] myalgias [ ] arthralgias [ ] fracture  Skin:                       [ ] negative [ ] rash [ ] itch  Neurological:        [ ] negative [ ] headache [ ] dizziness [ ] syncope [ ] weakness [ ] numbness  Psychiatric:           [ ] negative [ ] anxiety [ ] depression  Endocrine:            [ ] negative [ ] diabetes [ ] thyroid problem  Heme/Lymph:      [ ] negative [ ] anemia [ ] bleeding problem  Allergic/Immune: [ ] negative [ ] itchy eyes [ ] nasal discharge [ ] hives [ ] angioedema    [ ] All other systems negative  [ ] Unable to assess ROS due to      Physical Exam:  T(F): 99 (), Max: 100.3 ()  HR: 70 () (70 - 90)  BP: 149/70 () (122/80 - 152/75)  RR: 20 ()  SpO2: 98% ()  GENERAL: No acute distress, well-developed  HEAD:  Atraumatic, Normocephalic  ENT: EOMI, PERRLA, conjunctiva and sclera clear, Neck supple, No JVD, moist mucosa  CHEST/LUNG: Clear to auscultation bilaterally; No wheeze, equal breath sounds bilaterally   BACK: No spinal tenderness  HEART: Regular rate and rhythm; No murmurs, rubs, or gallops  ABDOMEN: Soft, Nontender, Nondistended; Bowel sounds present  EXTREMITIES:  No clubbing, cyanosis, or edema  PSYCH: Nl behavior, nl affect  NEUROLOGY: AAOx3, non-focal, cranial nerves intact  SKIN: Normal color, No rashes or lesions  LINES:    Cardiovascular Diagnostic Testing:    ECG: Personally reviewed:    Echo: Personally reviewed:    Stress Testing:    Cath:    Study Date:     2022   Name:           JUAN R ULLOA   :            1950   (72 years)   Gender:         female   MR#:            [id number]   [facility]#:           [id number]   Patient Class:  Outpatient     Cath Lab Report    Diagnostic Cardiologist:       Nick Pearce MD   Fellow:                        Rosalia Wilson   Referring Physician:           Nick Pearce MD     Procedures Performed   Procedures:              1.    Arterial Access - Right Femoral     2.    Diagnostic Coronary Angiography   3.    Left Heart Cath   4.    Ultrasound Guided Access   5.    iFR/dFR/rFR     Indications:               Unstable angina     Diagnostic Conclusions:     Patent RPDA stent with mild restenosis and normal IFR. Mostly  unchanged LCA disease.  Recommendations:         10/19:  CORONARY VESSELS: The coronary circulation is right dominant.  LM:   --  LM: Normal.  LAD:   --  Mid LAD: There was a 40 % stenosis.  --  D1: Angiography showed minor luminal irregularities with no flow  limiting lesions.  CX:   --  Proximal circumflex: Angiography showed minor luminal  irregularities with no flow limiting lesions.  --  OM1: Angiography showed minor luminal irregularities with no flow  limiting lesions.  --  OM2: Angiography showed minor luminal irregularities with no flow  limiting lesions.  RCA:   --  RPDA: Therewas a discrete 90 % stenosis at the ostium of the  vessel segment.  Stent to RPDA was deployed    Medical therapy.       Imaging:    CXR: Personally reviewed    ACC: [id number] [facility]AM: [facility] ORDERED BY: MAKENZIE LUTZ    PROCEDURE DATE: 2023        INTERPRETATION: ACC: [id number]  INDICATION, CLINICAL INFORMATION: pleuritic chest pain  TECHNIQUE: CT pulmonary angiogram of the chest . Uneventful administration of 70 cc Omnipaque 350. Coronal, sagittal and 3-D/MIP images were reconstructed/reviewed.  COMPARISON: Cardiac CT 2019 chest CT.    FINDINGS:    PULMONARY VESSELS: No pulmonary embolus. Main pulmonary artery normal in diameter.    HEART AND VASCULATURE: Heart size is normal. Large pericardial effusion. No aortic aneurysm or dissection.    LUNGS, AIRWAYS, PLEURA: Patent central airways. Small left pleural effusion. Subsegmental atelectasis of left lower lobe and lingula.    MEDIASTINUM: No mass or lymphadenopathy.    UPPER ABDOMEN: Within normal limits.    BONES AND SOFT TISSUES: No aggressive osseous lesion.    LOWER NECK: Within normal limits.    IMPRESSION:    No pulmonary embolus.    Large pericardial effusion.    Small left pleural effusion and mild atelectasis at the left base.    These findings were discussed with MOISES LUTZ, on 2023 4:41 PM with read back.    Labs: Personally reviewed                        8.6    13.41 )-----------( 385      ( 2023 14:33 )             28.1         128<L>  |  94<L>  |  13  ----------------------------<  207<H>  4.3   |  22  |  1.10    Ca    8.5      2023 14:33  Mg     1.6         TPro  6.8  /  Alb  2.8<L>  /  TBili  0.2  /  DBili  x   /  AST  23  /  ALT  17  /  AlkPhos  159<H>                  CARDIAC MARKERS ( 2023 16:54 )  24 ng/L / x     / x     / x     / x     / x      CARDIAC MARKERS ( 2023 14:33 )  23 ng/L / x     / x     / x     / x     / x          felodipine (Unknown)      T(F): 99 (), Max: 100.3 ()  HR: 70 () (70 - 90)  BP: 149/70 () (122/80 - 152/75)  RR: 20 ()  SpO2: 98% () Patient seen and evaluated at bedside    Chief Complaint: chest pain    HPI:    73 yo F with a PMH of CAD s/p Stent to RPDA 2019, HTN, HLD, DM2 (on oral meds), follows with Cards- Dr. Pearce (last saw him 3 mo ago) p/w pleuritic chest pain x 3-4 days a/w SOB. States she feels a "sticking" pain in her chest that worsens when she takes a deep breath. The pain also worsens with laying back and improves with sitting up. Sxs constant since onset and now associated with bilateral neck and back pain. Went to see her PMD today for eval and they called EMS to bring her to ED. Pt states she never had pain like this before. Did not have chest pain at the time she had LHC, at that time she had presented for routine cardiac evaluation and had scheduled cath. Has been taking Tylenol w/o relief. Was given ASA x 4 by EMS. Denies nausea, vomiting, fever, chills, cough, palpitations, leg pain/swelling, abd pan, headache, dizziness, syncope. Of note, pt states last week she felt sick with a "cold" and reports subj fever which has resolved since.      PMHx:   Hypertension    Hyperlipidemia    Diabetes    CAD (coronary artery disease)        PSHx:   No significant past surgical history        Allergies:  felodipine (Unknown)      Home Meds:  · 	clopidogrel 75 mg oral tablet: 1 tab(s) orally once a day  · 	aspirin 81 mg oral delayed release tablet: 1 tab(s) orally once a day  · 	metFORMIN 500 mg oral tablet: 1 tab(s) orally 3 times a day  	Hold 48 hrs, resume 22  · 	hydrALAZINE 50 mg oral tablet: 1 tab(s) orally 2 times a day  · 	cloNIDine 0.1 mg oral tablet: 1 tab(s) orally 2 times a day  · 	enalapril 20 mg oral tablet: 1 tab(s) orally 2 times a day  · 	simvastatin 40 mg oral tablet: 1 tab(s) orally once a day (at bedtime)  · 	gabapentin 300 mg oral capsule: 1 cap(s) orally 3 times a day  · 	metoprolol tartrate 50 mg oral tablet: 1 tab(s) orally 2 times a day  · 	pantoprazole 40 mg oral delayed release tablet: 1 tab(s) orally once a day      Current Medications:       FAMILY HISTORY:  Family history of cerebrovascular accident (CVA)        Social History:  Smoking History:  Alcohol Use:  Drug Use:    REVIEW OF SYSTEMS:  Constitutional:     [ ] negative [ ] fevers [ ] chills [ ] weight loss [ ] weight gain  HEENT:                  [ ] negative [ ] dry eyes [ ] eye irritation [ ] postnasal drip [ ] nasal congestion  CV:                         [ ] negative  [ ] chest pain [ ] orthopnea [ ] palpitations [ ] murmur  Resp:                     [ ] negative [ ] cough [ ] shortness of breath [ ] dyspnea [ ] wheezing [ ] sputum [ ]hemoptysis  GI:                          [ ] negative [ ] nausea [ ] vomiting [ ] diarrhea [ ] constipation [ ] abd pain [ ] dysphagia   :                        [ ] negative [ ] dysuria [ ] nocturia [ ] hematuria [ ] increased urinary frequency  Musculoskeletal: [ ] negative [ ] back pain [ ] myalgias [ ] arthralgias [ ] fracture  Skin:                       [ ] negative [ ] rash [ ] itch  Neurological:        [ ] negative [ ] headache [ ] dizziness [ ] syncope [ ] weakness [ ] numbness  Psychiatric:           [ ] negative [ ] anxiety [ ] depression  Endocrine:            [ ] negative [ ] diabetes [ ] thyroid problem  Heme/Lymph:      [ ] negative [ ] anemia [ ] bleeding problem  Allergic/Immune: [ ] negative [ ] itchy eyes [ ] nasal discharge [ ] hives [ ] angioedema    [ ] All other systems negative  [ ] Unable to assess ROS due to      Physical Exam:  T(F): 99 (), Max: 100.3 ()  HR: 70 () (70 - 90)  BP: 149/70 () (122/80 - 152/75)  RR: 20 ()  SpO2: 98% ()  GENERAL: No acute distress, well-developed  HEAD:  Atraumatic, Normocephalic  ENT: EOMI, PERRLA, conjunctiva and sclera clear, Neck supple, No JVD, moist mucosa  CHEST/LUNG: Clear to auscultation bilaterally; No wheeze, equal breath sounds bilaterally   BACK: No spinal tenderness  HEART: Regular rate and rhythm; No murmurs, rubs, or gallops  ABDOMEN: Soft, Nontender, Nondistended; Bowel sounds present  EXTREMITIES:  No clubbing, cyanosis, or edema  PSYCH: Nl behavior, nl affect  NEUROLOGY: AAOx3, non-focal, cranial nerves intact  SKIN: Normal color, No rashes or lesions  LINES:    Cardiovascular Diagnostic Testing:    ECG: Personally reviewed:    Echo: Personally reviewed:    Stress Testing:    Cath:    Study Date:     2022   Name:           JUAN R ULLOA   :            1950   (72 years)   Gender:         female   MR#:            [id number]   [facility]#:           [id number]   Patient Class:  Outpatient     Cath Lab Report    Diagnostic Cardiologist:       Nick Pearce MD   Fellow:                        Rosalia Wilson   Referring Physician:           Nick Pearce MD     Procedures Performed   Procedures:              1.    Arterial Access - Right Femoral     2.    Diagnostic Coronary Angiography   3.    Left Heart Cath   4.    Ultrasound Guided Access   5.    iFR/dFR/rFR     Indications:               Unstable angina     Diagnostic Conclusions:     Patent RPDA stent with mild restenosis and normal IFR. Mostly  unchanged LCA disease.  Recommendations:         10/19:  CORONARY VESSELS: The coronary circulation is right dominant.  LM:   --  LM: Normal.  LAD:   --  Mid LAD: There was a 40 % stenosis.  --  D1: Angiography showed minor luminal irregularities with no flow  limiting lesions.  CX:   --  Proximal circumflex: Angiography showed minor luminal  irregularities with no flow limiting lesions.  --  OM1: Angiography showed minor luminal irregularities with no flow  limiting lesions.  --  OM2: Angiography showed minor luminal irregularities with no flow  limiting lesions.  RCA:   --  RPDA: Therewas a discrete 90 % stenosis at the ostium of the  vessel segment.  Stent to RPDA was deployed    Medical therapy.       Imaging:    CXR: Personally reviewed    ACC: [id number] EXAM: [facility] BY: MAKENZIE LUTZ    PROCEDURE DATE: 2023        INTERPRETATION: ACC: [id number]  INDICATION, CLINICAL INFORMATION: pleuritic chest pain  TECHNIQUE: CT pulmonary angiogram of the chest . Uneventful administration of 70 cc Omnipaque 350. Coronal, sagittal and 3-D/MIP images were reconstructed/reviewed.  COMPARISON: Cardiac CT 2019 chest CT.    FINDINGS:    PULMONARY VESSELS: No pulmonary embolus. Main pulmonary artery normal in diameter.    HEART AND VASCULATURE: Heart size is normal. Large pericardial effusion. No aortic aneurysm or dissection.    LUNGS, AIRWAYS, PLEURA: Patent central airways. Small left pleural effusion. Subsegmental atelectasis of left lower lobe and lingula.    MEDIASTINUM: No mass or lymphadenopathy.    UPPER ABDOMEN: Within normal limits.    BONES AND SOFT TISSUES: No aggressive osseous lesion.    LOWER NECK: Within normal limits.    IMPRESSION:    No pulmonary embolus.    Large pericardial effusion.    Small left pleural effusion and mild atelectasis at the left base.    These findings were discussed with MOISES LUTZ, on 2023 4:41 PM with read back.    Labs: Personally reviewed                        8.6    13.41 )-----------( 385      ( 2023 14:33 )             28.1         128<L>  |  94<L>  |  13  ----------------------------<  207<H>  4.3   |  22  |  1.10    Ca    8.5      2023 14:33  Mg     1.6         TPro  6.8  /  Alb  2.8<L>  /  TBili  0.2  /  DBili  x   /  AST  23  /  ALT  17  /  AlkPhos  159<H>                  CARDIAC MARKERS ( 2023 16:54 )  24 ng/L / x     / x     / x     / x     / x      CARDIAC MARKERS ( 2023 14:33 )  23 ng/L / x     / x     / x     / x     / x          felodipine (Unknown)      T(F): 99 (), Max: 100.3 ()  HR: 70 () (70 - 90)  BP: 149/70 () (122/80 - 152/75)  RR: 20 ()  SpO2: 98% ()

## 2023-04-05 NOTE — H&P ADULT - PROBLEM SELECTOR PLAN 4
Baseline 12-13, now hemoglobin 8.6, may be component of chronic disease with MARQUEZ.   Ferritin 10, % saturation 6 on recent outpatient labs.   Start ferrous sulfate every other day

## 2023-04-05 NOTE — ASSESSMENT
[FreeTextEntry1] : 72-year-old female here with complaints of chest pain radiating around to the back, syncopal episode yesterday, weakness, shortness of breath.  EKG shows repolarization in the inferior leads with possible prior inferior wall MI.  No real acute ischemic changes seen.  4 baby aspirin were given for patient to chew and she was started on O2 nasal cannula.  EMS was called and she will be transported down to the emergency room for further evaluation.  Need to rule out acute coronary syndrome, aortic dissection, possible PE.

## 2023-04-05 NOTE — H&P ADULT - ASSESSMENT
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

## 2023-04-05 NOTE — H&P ADULT - NSHPPHYSICALEXAM_GEN_ALL_CORE
PHYSICAL EXAM:  GENERAL: NAD, lying in bed comfortably  HEAD:  Atraumatic, Normocephalic  EYES: EOMI, PERRLA, conjunctiva and sclera clear  ENT: Moist mucous membranes  NECK: Supple, No JVD  CHEST/LUNG: tachypnea and conversational dyspnea  HEART: irregularly irregular  ABDOMEN: Bowel sounds present; Soft, Nontender, Nondistended.   EXTREMITIES:  2+ Peripheral Pulses, brisk capillary refill. No clubbing, cyanosis, or edema  NERVOUS SYSTEM:  Alert & Oriented X3, speech clear. No deficits   MSK: FROM all 4 extremities, full and equal strength  SKIN: No rashes or lesions PHYSICAL EXAM:  Vital Signs Last 24 Hrs  T(C): 37.6 (04-05-23 @ 23:28), Max: 37.9 (04-05-23 @ 15:22)  T(F): 99.6 (04-05-23 @ 23:28), Max: 100.3 (04-05-23 @ 15:22)  HR: 132 (04-05-23 @ 23:28) (70 - 132)  BP: 127/82 (04-05-23 @ 23:28) (114/71 - 174/83)  BP(mean): --  RR: 26 (04-05-23 @ 23:28) (16 - 26)  SpO2: 93% (04-05-23 @ 23:28) (93% - 99%)    GENERAL: NAD, lying in bed comfortably  HEAD:  Atraumatic, Normocephalic  EYES: EOMI, PERRLA, conjunctiva and sclera clear  ENT: Moist mucous membranes  NECK: Supple, No JVD  CHEST/LUNG: tachypnea and conversational dyspnea  HEART: irregularly irregular  ABDOMEN: Bowel sounds present; Soft, Nontender, Nondistended.   EXTREMITIES:  2+ Peripheral Pulses, brisk capillary refill. No clubbing, cyanosis, or edema  NERVOUS SYSTEM:  Alert & Oriented X3, speech clear. No deficits   MSK: FROM all 4 extremities, full and equal strength  SKIN: No rashes or lesions

## 2023-04-05 NOTE — H&P ADULT - PROBLEM SELECTOR PLAN 9
DVT ppx: heparin gtt  Diet: consistent carb/DASH  Code status: full code  Dispo: admit to medicine DVT ppx: heparin gtt  Diet: NPO for now  Code status: full code  Dispo: admit to medicine

## 2023-04-05 NOTE — CONSULT NOTE ADULT - ASSESSMENT
71 yo F with a PMH of CAD s/p Stent to RPDA 2019, HTN, HLD, DM2 (on oral meds), follows with Cards- Dr. Pearce (last saw him 3 mo ago) p/w pleuritic chest pain x 3-4 days a/w SOB.  CT chest shows large pericardial effusion. Vitals are stable at this time.     #Chest pain  #Large pericardial effusion 71 yo F with a PMH of CAD s/p Stent to RPDA 2019, HTN, HLD, DM2 (on oral meds), follows with Cards- Dr. Pearce (last saw him 3 mo ago) p/w pleuritic chest pain x 3-4 days a/w SOB.  CT chest shows large pericardial effusion. Vitals are stable at this time.     #Chest pain  #Large pericardial effusion  Most likely in the setting of pericarditis as the patient recently had a fever and is having typical symptoms of pericarditis.   71 yo F with a PMH of CAD s/p Stent to RPDA 2019, HTN, HLD, DM2 (on oral meds), follows with Cards- Dr. Pearce (last saw him 3 mo ago) p/w pleuritic chest pain x 3-4 days a/w SOB.  CT chest shows large pericardial effusion. EKG does not show signs of pericarditis or ischemia.  Vitals are stable at this time.     #Chest pain  #Large pericardial effusion  Most likely in the setting of pericarditis as the patient recently had a fever and is having typical symptoms of pericarditis.  -Start the patient on aspirin 650mg q8h  -Start patient on colchicine 0.6mg BID  -Pantoprazole 40mg PO daily  -Please obtain full echo   -Patient is currently hemodynamically stable and not requiring urgent drainage  -Hold home antihypertensives for now, can restart gradually over the next few days    #CAD  -Aspirin as above  -Can stop plavix as stent is from 2019  Continue statin    Maikel Moon MD  Cardiology fellow 73 yo F with a PMH of CAD s/p Stent to RPDA 2019, HTN, HLD, DM2 (on oral meds), follows with Cards- Dr. Pearce (last saw him 3 mo ago) p/w pleuritic chest pain x 3-4 days a/w SOB.  CT chest shows large pericardial effusion. Echo shows moderate pericardial effusion with no evidence of tamponade. EKG does not show signs of pericarditis or ischemia.  Vitals are stable at this time.     #Chest pain  #Large pericardial effusion  Most likely in the setting of pericarditis as the patient recently had a fever and is having typical symptoms of pericarditis.  -Start the patient on aspirin 650mg q8h  -Start patient on colchicine 0.6mg BID  -Pantoprazole 40mg PO daily  -Please obtain full echo, limited echo showed moderate pericardial effusion with no evidence of tamponade  -Patient is currently hemodynamically stable and not requiring urgent drainage  -Hold home antihypertensives for now, can restart gradually over the next few days    #CAD  -Aspirin as above  -Can stop plavix as stent is from 2019  -Continue statin    Maikel Moon MD  Cardiology fellow

## 2023-04-05 NOTE — ED ADULT NURSE REASSESSMENT NOTE - NS ED NURSE REASSESS COMMENT FT1
Pt AxOx3, observed sitting up in stretcher conversing with RN without difficulty. Breathing spontaneous and unlabored. Pt updated on plan of care awaiting bed assignment, RTM tele. Maintained on cont. cardiac monitor.  No acute distress noted. Call bell within reach.

## 2023-04-06 DIAGNOSIS — I48.91 UNSPECIFIED ATRIAL FIBRILLATION: ICD-10-CM

## 2023-04-06 LAB
A1C WITH ESTIMATED AVERAGE GLUCOSE RESULT: 8.5 % — HIGH (ref 4–5.6)
ALBUMIN SERPL ELPH-MCNC: 2.2 G/DL — LOW (ref 3.3–5)
ALP SERPL-CCNC: 116 U/L — SIGNIFICANT CHANGE UP (ref 40–120)
ALT FLD-CCNC: 14 U/L — SIGNIFICANT CHANGE UP (ref 10–45)
ANION GAP SERPL CALC-SCNC: 11 MMOL/L — SIGNIFICANT CHANGE UP (ref 5–17)
ANION GAP SERPL CALC-SCNC: 13 MMOL/L — SIGNIFICANT CHANGE UP (ref 5–17)
APPEARANCE UR: CLEAR — SIGNIFICANT CHANGE UP
APTT BLD: 34.9 SEC — SIGNIFICANT CHANGE UP (ref 27.5–35.5)
APTT BLD: 54.5 SEC — HIGH (ref 27.5–35.5)
APTT BLD: 67.5 SEC — HIGH (ref 27.5–35.5)
AST SERPL-CCNC: 18 U/L — SIGNIFICANT CHANGE UP (ref 10–40)
BACTERIA # UR AUTO: NEGATIVE — SIGNIFICANT CHANGE UP
BASOPHILS # BLD AUTO: 0.03 K/UL — SIGNIFICANT CHANGE UP (ref 0–0.2)
BASOPHILS NFR BLD AUTO: 0.3 % — SIGNIFICANT CHANGE UP (ref 0–2)
BILIRUB SERPL-MCNC: 0.1 MG/DL — LOW (ref 0.2–1.2)
BILIRUB UR-MCNC: NEGATIVE — SIGNIFICANT CHANGE UP
BLD GP AB SCN SERPL QL: NEGATIVE — SIGNIFICANT CHANGE UP
BUN SERPL-MCNC: 12 MG/DL — SIGNIFICANT CHANGE UP (ref 7–23)
BUN SERPL-MCNC: 9 MG/DL — SIGNIFICANT CHANGE UP (ref 7–23)
CALCIUM SERPL-MCNC: 7.2 MG/DL — LOW (ref 8.4–10.5)
CALCIUM SERPL-MCNC: 8.7 MG/DL — SIGNIFICANT CHANGE UP (ref 8.4–10.5)
CHLORIDE SERPL-SCNC: 107 MMOL/L — SIGNIFICANT CHANGE UP (ref 96–108)
CHLORIDE SERPL-SCNC: 99 MMOL/L — SIGNIFICANT CHANGE UP (ref 96–108)
CO2 SERPL-SCNC: 20 MMOL/L — LOW (ref 22–31)
CO2 SERPL-SCNC: 22 MMOL/L — SIGNIFICANT CHANGE UP (ref 22–31)
COLOR SPEC: COLORLESS — SIGNIFICANT CHANGE UP
CREAT ?TM UR-MCNC: 20 MG/DL — SIGNIFICANT CHANGE UP
CREAT SERPL-MCNC: 0.87 MG/DL — SIGNIFICANT CHANGE UP (ref 0.5–1.3)
CREAT SERPL-MCNC: 1.26 MG/DL — SIGNIFICANT CHANGE UP (ref 0.5–1.3)
DIFF PNL FLD: ABNORMAL
EGFR: 45 ML/MIN/1.73M2 — LOW
EGFR: 71 ML/MIN/1.73M2 — SIGNIFICANT CHANGE UP
EOSINOPHIL # BLD AUTO: 0.22 K/UL — SIGNIFICANT CHANGE UP (ref 0–0.5)
EOSINOPHIL NFR BLD AUTO: 2.2 % — SIGNIFICANT CHANGE UP (ref 0–6)
EPI CELLS # UR: 2 /HPF — SIGNIFICANT CHANGE UP
ERYTHROCYTE [SEDIMENTATION RATE] IN BLOOD: 105 MM/HR — HIGH (ref 0–20)
ESTIMATED AVERAGE GLUCOSE: 197 MG/DL — HIGH (ref 68–114)
GLUCOSE BLDC GLUCOMTR-MCNC: 138 MG/DL — HIGH (ref 70–99)
GLUCOSE BLDC GLUCOMTR-MCNC: 170 MG/DL — HIGH (ref 70–99)
GLUCOSE BLDC GLUCOMTR-MCNC: 173 MG/DL — HIGH (ref 70–99)
GLUCOSE BLDC GLUCOMTR-MCNC: 200 MG/DL — HIGH (ref 70–99)
GLUCOSE BLDC GLUCOMTR-MCNC: 226 MG/DL — HIGH (ref 70–99)
GLUCOSE BLDC GLUCOMTR-MCNC: 237 MG/DL — HIGH (ref 70–99)
GLUCOSE SERPL-MCNC: 146 MG/DL — HIGH (ref 70–99)
GLUCOSE SERPL-MCNC: 264 MG/DL — HIGH (ref 70–99)
GLUCOSE UR QL: ABNORMAL
HCT VFR BLD CALC: 25.7 % — LOW (ref 34.5–45)
HCT VFR BLD CALC: 31.8 % — LOW (ref 34.5–45)
HCV AB S/CO SERPL IA: 0.12 S/CO — SIGNIFICANT CHANGE UP (ref 0–0.99)
HCV AB SERPL-IMP: SIGNIFICANT CHANGE UP
HGB BLD-MCNC: 7.8 G/DL — LOW (ref 11.5–15.5)
HGB BLD-MCNC: 9.4 G/DL — LOW (ref 11.5–15.5)
HYALINE CASTS # UR AUTO: 1 /LPF — SIGNIFICANT CHANGE UP (ref 0–2)
IMM GRANULOCYTES NFR BLD AUTO: 0.7 % — SIGNIFICANT CHANGE UP (ref 0–0.9)
KETONES UR-MCNC: NEGATIVE — SIGNIFICANT CHANGE UP
LACTATE SERPL-SCNC: 0.9 MMOL/L — SIGNIFICANT CHANGE UP (ref 0.5–2)
LEUKOCYTE ESTERASE UR-ACNC: NEGATIVE — SIGNIFICANT CHANGE UP
LYMPHOCYTES # BLD AUTO: 1.06 K/UL — SIGNIFICANT CHANGE UP (ref 1–3.3)
LYMPHOCYTES # BLD AUTO: 10.7 % — LOW (ref 13–44)
MAGNESIUM SERPL-MCNC: 1.4 MG/DL — LOW (ref 1.6–2.6)
MAGNESIUM SERPL-MCNC: 2.2 MG/DL — SIGNIFICANT CHANGE UP (ref 1.6–2.6)
MCHC RBC-ENTMCNC: 23.6 PG — LOW (ref 27–34)
MCHC RBC-ENTMCNC: 24.1 PG — LOW (ref 27–34)
MCHC RBC-ENTMCNC: 29.6 GM/DL — LOW (ref 32–36)
MCHC RBC-ENTMCNC: 30.4 GM/DL — LOW (ref 32–36)
MCV RBC AUTO: 79.6 FL — LOW (ref 80–100)
MCV RBC AUTO: 79.9 FL — LOW (ref 80–100)
MONOCYTES # BLD AUTO: 1.04 K/UL — HIGH (ref 0–0.9)
MONOCYTES NFR BLD AUTO: 10.5 % — SIGNIFICANT CHANGE UP (ref 2–14)
NEUTROPHILS # BLD AUTO: 7.49 K/UL — HIGH (ref 1.8–7.4)
NEUTROPHILS NFR BLD AUTO: 75.6 % — SIGNIFICANT CHANGE UP (ref 43–77)
NITRITE UR-MCNC: NEGATIVE — SIGNIFICANT CHANGE UP
NRBC # BLD: 0 /100 WBCS — SIGNIFICANT CHANGE UP (ref 0–0)
NRBC # BLD: 0 /100 WBCS — SIGNIFICANT CHANGE UP (ref 0–0)
OSMOLALITY SERPL: 286 MOSMOL/KG — SIGNIFICANT CHANGE UP (ref 280–301)
OSMOLALITY UR: 166 MOS/KG — LOW (ref 300–900)
PH UR: 6.5 — SIGNIFICANT CHANGE UP (ref 5–8)
PHOSPHATE SERPL-MCNC: 2.2 MG/DL — LOW (ref 2.5–4.5)
PHOSPHATE SERPL-MCNC: 2.6 MG/DL — SIGNIFICANT CHANGE UP (ref 2.5–4.5)
PLATELET # BLD AUTO: 379 K/UL — SIGNIFICANT CHANGE UP (ref 150–400)
PLATELET # BLD AUTO: 495 K/UL — HIGH (ref 150–400)
POTASSIUM SERPL-MCNC: 3 MMOL/L — LOW (ref 3.5–5.3)
POTASSIUM SERPL-MCNC: 4.5 MMOL/L — SIGNIFICANT CHANGE UP (ref 3.5–5.3)
POTASSIUM SERPL-SCNC: 3 MMOL/L — LOW (ref 3.5–5.3)
POTASSIUM SERPL-SCNC: 4.5 MMOL/L — SIGNIFICANT CHANGE UP (ref 3.5–5.3)
POTASSIUM UR-SCNC: 20 MMOL/L — SIGNIFICANT CHANGE UP
PROT ?TM UR-MCNC: 198 MG/DL — HIGH (ref 0–12)
PROT SERPL-MCNC: 5.5 G/DL — LOW (ref 6–8.3)
PROT UR-MCNC: ABNORMAL
PROT/CREAT UR-RTO: 9.9 RATIO — HIGH (ref 0–0.2)
RBC # BLD: 3.23 M/UL — LOW (ref 3.8–5.2)
RBC # BLD: 3.98 M/UL — SIGNIFICANT CHANGE UP (ref 3.8–5.2)
RBC # FLD: 17.1 % — HIGH (ref 10.3–14.5)
RBC # FLD: 17.2 % — HIGH (ref 10.3–14.5)
RBC CASTS # UR COMP ASSIST: 389 /HPF — HIGH (ref 0–4)
RH IG SCN BLD-IMP: POSITIVE — SIGNIFICANT CHANGE UP
SODIUM SERPL-SCNC: 134 MMOL/L — LOW (ref 135–145)
SODIUM SERPL-SCNC: 138 MMOL/L — SIGNIFICANT CHANGE UP (ref 135–145)
SODIUM UR-SCNC: 25 MMOL/L — SIGNIFICANT CHANGE UP
SP GR SPEC: 1.01 — SIGNIFICANT CHANGE UP (ref 1.01–1.02)
TSH SERPL-MCNC: 1.16 UIU/ML — SIGNIFICANT CHANGE UP (ref 0.27–4.2)
UROBILINOGEN FLD QL: NEGATIVE — SIGNIFICANT CHANGE UP
UUN UR-MCNC: 142 MG/DL — SIGNIFICANT CHANGE UP
WBC # BLD: 11.3 K/UL — HIGH (ref 3.8–10.5)
WBC # BLD: 9.91 K/UL — SIGNIFICANT CHANGE UP (ref 3.8–10.5)
WBC # FLD AUTO: 11.3 K/UL — HIGH (ref 3.8–10.5)
WBC # FLD AUTO: 9.91 K/UL — SIGNIFICANT CHANGE UP (ref 3.8–10.5)
WBC UR QL: 10 /HPF — HIGH (ref 0–5)

## 2023-04-06 PROCEDURE — 71045 X-RAY EXAM CHEST 1 VIEW: CPT | Mod: 26

## 2023-04-06 PROCEDURE — 93321 DOPPLER ECHO F-UP/LMTD STD: CPT | Mod: 26

## 2023-04-06 PROCEDURE — 93010 ELECTROCARDIOGRAM REPORT: CPT

## 2023-04-06 PROCEDURE — 99223 1ST HOSP IP/OBS HIGH 75: CPT | Mod: GC

## 2023-04-06 PROCEDURE — 99232 SBSQ HOSP IP/OBS MODERATE 35: CPT | Mod: GC

## 2023-04-06 PROCEDURE — 93308 TTE F-UP OR LMTD: CPT | Mod: 26

## 2023-04-06 RX ORDER — INSULIN LISPRO 100/ML
VIAL (ML) SUBCUTANEOUS
Refills: 0 | Status: DISCONTINUED | OUTPATIENT
Start: 2023-04-06 | End: 2023-04-11

## 2023-04-06 RX ORDER — HEPARIN SODIUM 5000 [USP'U]/ML
1400 INJECTION INTRAVENOUS; SUBCUTANEOUS
Qty: 25000 | Refills: 0 | Status: DISCONTINUED | OUTPATIENT
Start: 2023-04-06 | End: 2023-04-07

## 2023-04-06 RX ORDER — METOPROLOL TARTRATE 50 MG
37.5 TABLET ORAL ONCE
Refills: 0 | Status: COMPLETED | OUTPATIENT
Start: 2023-04-06 | End: 2023-04-06

## 2023-04-06 RX ORDER — HYDROMORPHONE HYDROCHLORIDE 2 MG/ML
0.25 INJECTION INTRAMUSCULAR; INTRAVENOUS; SUBCUTANEOUS ONCE
Refills: 0 | Status: DISCONTINUED | OUTPATIENT
Start: 2023-04-06 | End: 2023-04-07

## 2023-04-06 RX ORDER — DEXTROSE 50 % IN WATER 50 %
12.5 SYRINGE (ML) INTRAVENOUS ONCE
Refills: 0 | Status: DISCONTINUED | OUTPATIENT
Start: 2023-04-06 | End: 2023-04-11

## 2023-04-06 RX ORDER — SODIUM CHLORIDE 9 MG/ML
1000 INJECTION, SOLUTION INTRAVENOUS
Refills: 0 | Status: DISCONTINUED | OUTPATIENT
Start: 2023-04-06 | End: 2023-04-11

## 2023-04-06 RX ORDER — GLUCAGON INJECTION, SOLUTION 0.5 MG/.1ML
1 INJECTION, SOLUTION SUBCUTANEOUS ONCE
Refills: 0 | Status: DISCONTINUED | OUTPATIENT
Start: 2023-04-06 | End: 2023-04-11

## 2023-04-06 RX ORDER — DEXTROSE 50 % IN WATER 50 %
25 SYRINGE (ML) INTRAVENOUS ONCE
Refills: 0 | Status: DISCONTINUED | OUTPATIENT
Start: 2023-04-06 | End: 2023-04-11

## 2023-04-06 RX ORDER — POTASSIUM CHLORIDE 20 MEQ
20 PACKET (EA) ORAL ONCE
Refills: 0 | Status: COMPLETED | OUTPATIENT
Start: 2023-04-06 | End: 2023-04-06

## 2023-04-06 RX ORDER — METOPROLOL TARTRATE 50 MG
50 TABLET ORAL EVERY 12 HOURS
Refills: 0 | Status: DISCONTINUED | OUTPATIENT
Start: 2023-04-06 | End: 2023-04-11

## 2023-04-06 RX ORDER — MAGNESIUM SULFATE 500 MG/ML
2 VIAL (ML) INJECTION ONCE
Refills: 0 | Status: COMPLETED | OUTPATIENT
Start: 2023-04-06 | End: 2023-04-06

## 2023-04-06 RX ORDER — SODIUM CHLORIDE 9 MG/ML
1000 INJECTION INTRAMUSCULAR; INTRAVENOUS; SUBCUTANEOUS
Refills: 0 | Status: DISCONTINUED | OUTPATIENT
Start: 2023-04-06 | End: 2023-04-08

## 2023-04-06 RX ORDER — POTASSIUM CHLORIDE 20 MEQ
40 PACKET (EA) ORAL ONCE
Refills: 0 | Status: COMPLETED | OUTPATIENT
Start: 2023-04-06 | End: 2023-04-06

## 2023-04-06 RX ORDER — IPRATROPIUM/ALBUTEROL SULFATE 18-103MCG
3 AEROSOL WITH ADAPTER (GRAM) INHALATION ONCE
Refills: 0 | Status: DISCONTINUED | OUTPATIENT
Start: 2023-04-06 | End: 2023-04-06

## 2023-04-06 RX ORDER — ACETAMINOPHEN 500 MG
1000 TABLET ORAL ONCE
Refills: 0 | Status: COMPLETED | OUTPATIENT
Start: 2023-04-06 | End: 2023-04-06

## 2023-04-06 RX ORDER — DEXTROSE 50 % IN WATER 50 %
15 SYRINGE (ML) INTRAVENOUS ONCE
Refills: 0 | Status: DISCONTINUED | OUTPATIENT
Start: 2023-04-06 | End: 2023-04-11

## 2023-04-06 RX ORDER — SIMETHICONE 80 MG/1
80 TABLET, CHEWABLE ORAL ONCE
Refills: 0 | Status: COMPLETED | OUTPATIENT
Start: 2023-04-06 | End: 2023-04-06

## 2023-04-06 RX ORDER — INSULIN LISPRO 100/ML
VIAL (ML) SUBCUTANEOUS AT BEDTIME
Refills: 0 | Status: DISCONTINUED | OUTPATIENT
Start: 2023-04-06 | End: 2023-04-11

## 2023-04-06 RX ORDER — METOPROLOL TARTRATE 50 MG
12.5 TABLET ORAL EVERY 12 HOURS
Refills: 0 | Status: DISCONTINUED | OUTPATIENT
Start: 2023-04-06 | End: 2023-04-06

## 2023-04-06 RX ORDER — SODIUM,POTASSIUM PHOSPHATES 278-250MG
1 POWDER IN PACKET (EA) ORAL ONCE
Refills: 0 | Status: COMPLETED | OUTPATIENT
Start: 2023-04-06 | End: 2023-04-06

## 2023-04-06 RX ADMIN — Medication 650 MILLIGRAM(S): at 22:30

## 2023-04-06 RX ADMIN — HEPARIN SODIUM 1400 UNIT(S)/HR: 5000 INJECTION INTRAVENOUS; SUBCUTANEOUS at 08:32

## 2023-04-06 RX ADMIN — Medication 2: at 17:39

## 2023-04-06 RX ADMIN — SODIUM CHLORIDE 100 MILLILITER(S): 9 INJECTION INTRAMUSCULAR; INTRAVENOUS; SUBCUTANEOUS at 00:13

## 2023-04-06 RX ADMIN — SIMETHICONE 80 MILLIGRAM(S): 80 TABLET, CHEWABLE ORAL at 22:30

## 2023-04-06 RX ADMIN — HEPARIN SODIUM 1500 UNIT(S)/HR: 5000 INJECTION INTRAVENOUS; SUBCUTANEOUS at 22:06

## 2023-04-06 RX ADMIN — Medication 12.5 MILLIGRAM(S): at 05:34

## 2023-04-06 RX ADMIN — SODIUM CHLORIDE 75 MILLILITER(S): 9 INJECTION INTRAMUSCULAR; INTRAVENOUS; SUBCUTANEOUS at 15:14

## 2023-04-06 RX ADMIN — Medication 325 MILLIGRAM(S): at 06:10

## 2023-04-06 RX ADMIN — Medication 25 GRAM(S): at 08:28

## 2023-04-06 RX ADMIN — Medication 0.6 MILLIGRAM(S): at 05:33

## 2023-04-06 RX ADMIN — GABAPENTIN 300 MILLIGRAM(S): 400 CAPSULE ORAL at 12:45

## 2023-04-06 RX ADMIN — Medication 0.6 MILLIGRAM(S): at 17:11

## 2023-04-06 RX ADMIN — Medication 650 MILLIGRAM(S): at 05:33

## 2023-04-06 RX ADMIN — Medication 1: at 08:33

## 2023-04-06 RX ADMIN — PANTOPRAZOLE SODIUM 40 MILLIGRAM(S): 20 TABLET, DELAYED RELEASE ORAL at 06:10

## 2023-04-06 RX ADMIN — Medication 50 MILLIGRAM(S): at 17:11

## 2023-04-06 RX ADMIN — Medication 1 PACKET(S): at 08:27

## 2023-04-06 RX ADMIN — GABAPENTIN 300 MILLIGRAM(S): 400 CAPSULE ORAL at 22:30

## 2023-04-06 RX ADMIN — HEPARIN SODIUM 1500 UNIT(S)/HR: 5000 INJECTION INTRAVENOUS; SUBCUTANEOUS at 14:43

## 2023-04-06 RX ADMIN — Medication 650 MILLIGRAM(S): at 12:45

## 2023-04-06 RX ADMIN — SIMVASTATIN 40 MILLIGRAM(S): 20 TABLET, FILM COATED ORAL at 22:30

## 2023-04-06 RX ADMIN — Medication 400 MILLIGRAM(S): at 22:30

## 2023-04-06 RX ADMIN — Medication 40 MILLIEQUIVALENT(S): at 08:27

## 2023-04-06 RX ADMIN — Medication 20 MILLIEQUIVALENT(S): at 09:44

## 2023-04-06 RX ADMIN — GABAPENTIN 300 MILLIGRAM(S): 400 CAPSULE ORAL at 05:32

## 2023-04-06 RX ADMIN — Medication 37.5 MILLIGRAM(S): at 11:14

## 2023-04-06 NOTE — PROGRESS NOTE ADULT - PROBLEM SELECTOR PLAN 7
Pt on metformin at home  - Continue statin  - SSI mealtime and bedtime  - f/u A1c Pt on metformin at home. A1c 8.5  - Continue statin  - SSI mealtime and bedtime

## 2023-04-06 NOTE — PATIENT PROFILE ADULT - FALL HARM RISK - RISK INTERVENTIONS

## 2023-04-06 NOTE — PROGRESS NOTE ADULT - PROBLEM SELECTOR PLAN 1
Recent viral illness last week? Now with worsening chest pain, concern for pericarditis after recent URI.   - limited TTE shows moderate pericardial effusion with no tamponade physiology  - CRP elevated (286), proBNP elevated (2285. Trops wnl  - c/w aspirin 650 q8h  - c/w colchicine 0.6 q12h  - f/u full TTE  - cards recs appreciated; no need for urgent drainage per cards given hemodynamic stability Recent viral illness last week? Now with worsening chest pain, concern for pericarditis after recent URI.   - limited TTE on admission shows moderate pericardial effusion with no tamponade physiology  - CRP elevated (286), proBNP elevated (2285. Trops wnl  - c/w aspirin 650 q8h  - c/w colchicine 0.6 q12h  - repeat full TTE today shows small pericardial effusion, no hemodynamic compromise  - cards recs appreciated; no need for urgent drainage per cards given hemodynamic stability

## 2023-04-06 NOTE — PROGRESS NOTE ADULT - SUBJECTIVE AND OBJECTIVE BOX
DATE OF SERVICE: 23 @ 07:22    Patient is a 72y old  Female who presents with a chief complaint of Chest pain (2023 21:52)      SUBJECTIVE / OVERNIGHT EVENTS:  Pt in AFib w/ RVR overnight to 140s, s/p IV metoprolol 5 x2 -> HR 120s. Started heparin gtt. Noted possible hematuria. Heparin still running this am  Tmax 100.3 since admission, hemodynamically stable. On room air  ***    MEDICATIONS  (STANDING):  aspirin 650 milliGRAM(s) Oral every 8 hours  colchicine 0.6 milliGRAM(s) Oral every 12 hours  ferrous    sulfate 325 milliGRAM(s) Oral <User Schedule>  gabapentin 300 milliGRAM(s) Oral three times a day  heparin  Infusion. 1100 Unit(s)/Hr (11 mL/Hr) IV Continuous <Continuous>  insulin lispro (ADMELOG) corrective regimen sliding scale   SubCutaneous three times a day before meals  insulin lispro (ADMELOG) corrective regimen sliding scale   SubCutaneous at bedtime  metoprolol tartrate 12.5 milliGRAM(s) Oral every 12 hours  pantoprazole    Tablet 40 milliGRAM(s) Oral before breakfast  simvastatin 40 milliGRAM(s) Oral at bedtime  sodium chloride 0.9%. 1000 milliLiter(s) (100 mL/Hr) IV Continuous <Continuous>    MEDICATIONS  (PRN):  heparin   Injectable 5000 Unit(s) IV Push every 6 hours PRN For aPTT less than 40  heparin   Injectable 2500 Unit(s) IV Push every 6 hours PRN For aPTT between 40 - 57      Vital Signs Last 24 Hrs  T(C): 36.6 (2023 04:00), Max: 37.9 (2023 15:22)  T(F): 97.9 (2023 04:00), Max: 100.3 (2023 15:22)  HR: 120 (2023 04:00) (70 - 132)  BP: 145/88 (2023 04:00) (114/71 - 174/83)  BP(mean): --  RR: 20 (2023 04:00) (16 - 26)  SpO2: 94% (2023 04:00) (93% - 99%)    Parameters below as of 2023 04:00  Patient On (Oxygen Delivery Method): room air      CAPILLARY BLOOD GLUCOSE  POCT Blood Glucose.: 173 mg/dL (2023 06:18)  POCT Blood Glucose.: 200 mg/dL (2023 00:37)    I&O's Summary    2023 07:01  -  2023 07:00  --------------------------------------------------------  IN: 0 mL / OUT: 900 mL / NET: -900 mL        PHYSICAL EXAM:  ***  GENERAL: No apparent distress  HEAD:  Atraumatic, normocephalic  EYES: EOMI, PERRLA, conjunctiva and sclera clear  NECK: Supple, no JVD  CHEST/LUNG: Clear to auscultation bilaterally; No wheeze, rales, rhonchi  HEART: Regular rate and rhythm; No murmurs, rubs, or gallops  ABDOMEN: Soft, nontender, nondistended; Bowel sounds present  EXTREMITIES:  2+ peripheral pulses; No clubbing, cyanosis, or edema  PSYCH: AAOx3  NEUROLOGY: No focal deficits  SKIN: No rashes or lesions    LABS:                        7.8    9.91  )-----------( 379      ( 2023 06:30 )             25.7     04-06    138  |  107  |  9   ----------------------------<  146<H>  3.0<L>   |  20<L>  |  0.87    Ca    7.2<L>      2023 06:31  Phos  2.2     04-06  Mg     1.4     04-06    TPro  5.5<L>  /  Alb  2.2<L>  /  TBili  0.1<L>  /  DBili  x   /  AST  18  /  ALT  14  /  AlkPhos  116  04-06    PT/INR - ( 2023 21:55 )   PT: 14.1 sec;   INR: 1.21 ratio         PTT - ( 2023 21:55 )  PTT:32.8 sec      Urinalysis Basic - ( 2023 04:09 )    Color: Colorless / Appearance: Clear / S.012 / pH: x  Gluc: x / Ketone: Negative  / Bili: Negative / Urobili: Negative   Blood: x / Protein: 100 mg/dL / Nitrite: Negative   Leuk Esterase: Negative / RBC: 389 /hpf / WBC 10 /HPF   Sq Epi: x / Non Sq Epi: x / Bacteria: Negative        RADIOLOGY & ADDITIONAL TESTS:  Reviewed    < from: CT Angio Chest PE Protocol w/ IV Cont (23 @ 16:08) >  FINDINGS:    PULMONARY VESSELS: No pulmonary embolus. Main pulmonary artery normal in   diameter.    HEART AND VASCULATURE: Heart size is normal. Large pericardial effusion.   No aortic aneurysm or dissection.    LUNGS, AIRWAYS, PLEURA: Patent central airways. Small left pleural   effusion. Subsegmental atelectasis of left lower lobe and lingula.    MEDIASTINUM: No mass or lymphadenopathy.    UPPER ABDOMEN: Within normal limits.    BONES AND SOFT TISSUES: No aggressive osseous lesion.    LOWER NECK: Within normal limits.    IMPRESSION:    No pulmonary embolus.    Large pericardial effusion.    Small left pleural effusion and mild atelectasis at the left base.    < end of copied text >      < from: TTE W or WO Ultrasound Enhancing Agent (23 @ 18:44) >  CONCLUSIONS:      1. Small leftventricular cavity size. The left ventricular wall thickness is normal. The left ventricular systolic function is normal. There are no regional wall motion abnormalities seen.   2. There is normal left ventricular diastolic function.   3. Normal right ventricular cavity size, normal wall thickness and normal systolic function.   4. Moderate pericardial effusion with no evidence of hemodynamic compromise: respiratory variation across the mitral valve E wave is not greater than 30%, greater than 60% respiratory variation across the tricuspid valve E wave, no early diastolic inversion of the right ventricle, no diastolic collapse of right atrium, exceding 1/3 of the cardiac cycle and IVC and hepatic vein were not imaged on this study.   5. No prior echocardiogram is available for comparison.    < end of copied text >         DATE OF SERVICE: 23 @ 07:22    Patient is a 72y old  Female who presents with a chief complaint of Chest pain (2023 21:52)      SUBJECTIVE / OVERNIGHT EVENTS:  Pt in AFib w/ RVR overnight to 140s, s/p IV metoprolol 5 x2 -> HR 120s. Started heparin gtt. Noted possible hematuria. Heparin still running this am  Tmax 100.3 since admission, hemodynamically stable. On room air  Reports improvement in chest pain today. Denies shortness of breath, dizziness or lightheadedness    MEDICATIONS  (STANDING):  aspirin 650 milliGRAM(s) Oral every 8 hours  colchicine 0.6 milliGRAM(s) Oral every 12 hours  ferrous    sulfate 325 milliGRAM(s) Oral <User Schedule>  gabapentin 300 milliGRAM(s) Oral three times a day  heparin  Infusion. 1100 Unit(s)/Hr (11 mL/Hr) IV Continuous <Continuous>  insulin lispro (ADMELOG) corrective regimen sliding scale   SubCutaneous three times a day before meals  insulin lispro (ADMELOG) corrective regimen sliding scale   SubCutaneous at bedtime  metoprolol tartrate 12.5 milliGRAM(s) Oral every 12 hours  pantoprazole    Tablet 40 milliGRAM(s) Oral before breakfast  simvastatin 40 milliGRAM(s) Oral at bedtime  sodium chloride 0.9%. 1000 milliLiter(s) (100 mL/Hr) IV Continuous <Continuous>    MEDICATIONS  (PRN):  heparin   Injectable 5000 Unit(s) IV Push every 6 hours PRN For aPTT less than 40  heparin   Injectable 2500 Unit(s) IV Push every 6 hours PRN For aPTT between 40 - 57      Vital Signs Last 24 Hrs  T(C): 36.6 (2023 04:00), Max: 37.9 (2023 15:22)  T(F): 97.9 (2023 04:00), Max: 100.3 (2023 15:22)  HR: 120 (2023 04:00) (70 - 132)  BP: 145/88 (2023 04:00) (114/71 - 174/83)  BP(mean): --  RR: 20 (2023 04:00) (16 - 26)  SpO2: 94% (2023 04:00) (93% - 99%)    Parameters below as of 2023 04:00  Patient On (Oxygen Delivery Method): room air      CAPILLARY BLOOD GLUCOSE  POCT Blood Glucose.: 173 mg/dL (2023 06:18)  POCT Blood Glucose.: 200 mg/dL (2023 00:37)    I&O's Summary    2023 07:01  -  2023 07:00  --------------------------------------------------------  IN: 0 mL / OUT: 900 mL / NET: -900 mL        PHYSICAL EXAM:  GENERAL: No apparent distress, seated upright in bed  HEAD:  Atraumatic, normocephalic  EYES: EOMI, PERRLA, conjunctiva and sclera clear  NECK: Supple, no JVD  CHEST/LUNG: Clear to auscultation bilaterally; No wheeze, rales, rhonchi. Appears mildly labored on room air  HEART: Fast, irregular rate & rhythm. No murmurs or rubs  ABDOMEN: Soft, nontender, nondistended; Bowel sounds present  EXTREMITIES:  No LE edema  PSYCH: AAOx3  NEUROLOGY: No focal deficits  SKIN: No rashes or lesions      LABS:                        7.8    9.91  )-----------( 379      ( 2023 06:30 )             25.7     04-06    138  |  107  |  9   ----------------------------<  146<H>  3.0<L>   |  20<L>  |  0.87    Ca    7.2<L>      2023 06:31  Phos  2.2     04-06  Mg     1.4     04-06    TPro  5.5<L>  /  Alb  2.2<L>  /  TBili  0.1<L>  /  DBili  x   /  AST  18  /  ALT  14  /  AlkPhos  116  04-06    PT/INR - ( 2023 21:55 )   PT: 14.1 sec;   INR: 1.21 ratio       PTT - ( 2023 21:55 )  PTT:32.8 sec      Urinalysis Basic - ( 2023 04:09 )    Color: Colorless / Appearance: Clear / S.012 / pH: x  Gluc: x / Ketone: Negative  / Bili: Negative / Urobili: Negative   Blood: x / Protein: 100 mg/dL / Nitrite: Negative   Leuk Esterase: Negative / RBC: 389 /hpf / WBC 10 /HPF   Sq Epi: x / Non Sq Epi: x / Bacteria: Negative        RADIOLOGY & ADDITIONAL TESTS:  Reviewed    < from: TTE W or WO Ultrasound Enhancing Agent (23 @ 06:21) >  CONCLUSIONS:      1. Small pericardial effusion with no evidence of hemodynamic compromise: no diastolic collapse of right atrium, exceding 1/3 of the cardiac cycle and no early diastolic inversion of the right ventricle.   2. Compared to the transthoracic echocardiogram performed on 2023 The pericardial effusion has decreased in size.    ________________________________________________________________________________________  FINDINGS:  Left Ventricle:  The left ventricular systolic function is normal with an ejection fraction visually estimated at 70 to 75%.     Pericardium:  There is Small pericardial effusion measuring 0.80 cm with no evidence of hemodynamic compromise. This was supported by evidence of, the right atrium does not display diastolic collapse, exceding 1/3 of the cardiac cycle and no early diastolic inversion of the right ventricle.    < end of copied text >      < from: CT Angio Chest PE Protocol w/ IV Cont (23 @ 16:08) >  FINDINGS:    PULMONARY VESSELS: No pulmonary embolus. Main pulmonary artery normal in   diameter.    HEART AND VASCULATURE: Heart size is normal. Large pericardial effusion.   No aortic aneurysm or dissection.    LUNGS, AIRWAYS, PLEURA: Patent central airways. Small left pleural   effusion. Subsegmental atelectasis of left lower lobe and lingula.    MEDIASTINUM: No mass or lymphadenopathy.    UPPER ABDOMEN: Within normal limits.    BONES AND SOFT TISSUES: No aggressive osseous lesion.    LOWER NECK: Within normal limits.    IMPRESSION:    No pulmonary embolus.    Large pericardial effusion.    Small left pleural effusion and mild atelectasis at the left base.    < end of copied text >

## 2023-04-06 NOTE — PATIENT PROFILE ADULT - HAVE YOU EXPERIENCED VIOLENCE OR A TRAUMATIC EVENT?
Attempted to contact patient regarding upcoming colonoscopy procedure on 7/12/22 for pre assessment questions. No answer.     Left message to return call to [phone] #3    Covid test scheduled? No. Discuss at home rapid antigen COVID test 1-2 days prior to procedure.    Arrival time: 1215    Facility location:     Sedation type: CS     Indication for procedure: rectal bleeding     Anticoagulants: no.     Bowel prep recommendation: Extended prep d/t constipation    Extended script sent to [place] [id number] [place] [address] Prep instructions sent via [employer].    Alexandra Kessler RN        
Pre assessment questions completed for upcoming Colonoscopy procedure scheduled on 7/12/22    COVID policy reviewed. Patient to complete rapid antigen test one to two days before their scheduled procedure. Patient to bring photo of the results when they come in for their procedure.    Reviewed procedural arrival time 12:15pm and facility location .    Designated  policy reviewed. Instructed to have someone stay 6 hours post procedure.     Procedure indication: Rectal bleeding    Bowel prep recommendation: Extended    Extended prep script sent to See below pharmacy. Prep instructions sent via [employer].    Reviewed Colonoscopy prep instructions with patient. No fiber/iron supplements or foods that contain nuts/seeds 7 days prior to procedure.     Anticoagulation/blood thinners? None    Electronic implanted devices? None    Patient verbalized understanding and had no questions or concerns at this time.    Raiza Zamora RN      
no

## 2023-04-06 NOTE — PROGRESS NOTE ADULT - ASSESSMENT
72F hx DM, HTN, CAD s/p stent (2019), neuro aortic thrombus not on AC, admitted with worsening pleuritic CP x3 days, fevers x1 week, found to have large pericardial effusion on CT chest, no evidence tamponade on limited echo. Started on ASA/Colchicine for suspected pericarditis. Course c/b new onset AFib RVR, started on heparin gtt 72F hx DM, HTN, CAD s/p stent (2019), admitted with worsening pleuritic CP x3 days, fevers x1 week, found to have large pericardial effusion on CT chest, no evidence tamponade on limited echo. Started on ASA/Colchicine for suspected pericarditis. Course c/b new onset AFib RVR, started on heparin gtt

## 2023-04-06 NOTE — PROGRESS NOTE ADULT - PROBLEM SELECTOR PLAN 5
Sodium 128 on admission, suspect in setting of poor PO intake.   - started on 100cc/hr NS  - Na 138 this morning Sodium 128 on admission, suspect in setting of poor PO intake.   - started on 100cc/hr NS  - Na 138 this morning. Plan to hold fluids this am and recheck BMP at noon  - avoid overcorrection

## 2023-04-06 NOTE — PROGRESS NOTE ADULT - ATTENDING COMMENTS
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    -Appreciate cardiology recommendations. POCUS more suggestive for moderate effusion w/o signs of tamponade physiology. Pt developed new onset afib w/ rvr during admission  - continue with heparin gtt for now.  -Cont. high dose asa and colchicine for pericarditis for now  -Possible viral cause of pericarditis and pericardial effusion? Pt's chest pain seems pleuritic in nature  -Trend PTT, vital signs frequently  -Obtain dedicated echo; unsure if role for a diagnostic pericardiocentesis?  -Telemetry  -Holding antihypertensives for now, cardio recs noted, resume patient's regimen of 50mg BID lopressor. HR has been 110s to 130s.  -PPI prophylaxis  - anemia noted; lower than prior baseline; no signs of bleeding noted; f/u anemia panel; repeat labs in PM; iron supplement added on   - no longer need plavix since stent is from 2019  - A1c 8.5   -F/u cardiology recommendations     Rest as above. Discussed with HS. 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    -Appreciate cardiology recommendations. POCUS more suggestive for moderate effusion w/o signs of tamponade physiology. Pt developed new onset afib w/ rvr during admission  - continue with heparin gtt for now.  -Cont. high dose asa and colchicine for pericarditis for now  -Possible viral cause of pericarditis and pericardial effusion? Pt's chest pain seems pleuritic in nature  -Trend PTT, vital signs frequently  -Obtain dedicated echo; unsure if role for a diagnostic pericardiocentesis?  -Telemetry  -Holding antihypertensives for now, cardio recs noted, resume patient's regimen of 50mg BID lopressor. HR has been 110s to 130s.  -PPI prophylaxis  - anemia noted; lower than prior baseline; no signs of bleeding noted; f/u anemia panel; repeat labs in PM; iron supplement added on   - no longer need plavix since stent is from 2019  - A1c 8.5   -F/u cardiology recommendations     Rest as above. Discussed with HS

## 2023-04-06 NOTE — PROGRESS NOTE ADULT - PROBLEM SELECTOR PLAN 9
DVT ppx: heparin gtt  Diet: NPO for now  Code status: full code  Dispo: TBD DVT ppx: heparin gtt  Diet: regular  Code status: full code  Dispo: TBD DVT ppx: heparin gtt  Diet: CC/DASH vegan  Code status: full code  Dispo: TBD

## 2023-04-06 NOTE — PROGRESS NOTE ADULT - PROBLEM SELECTOR PLAN 8
Pt on enalapril 20 bid, clonidine 0.1 bid, hydral 50 bid at home  - hold antihypertensives for now, can restart gradually as tolerated

## 2023-04-06 NOTE — CHART NOTE - NSCHARTNOTEFT_GEN_A_CORE
72 year old woman with prior coronary stent, presents with pleuritic chest pain, moderate pericardial effusion on echo, treating for pericarditis. Since admission developed atrial fibrillation with rapid ventricular response. Echocardiogram indicates normal LV systolic function, no regional wall motion abnormality, moderate pericardial effusion, but no signs of hemodynamic compromise, no tamponade physiology. Need rate control of atrial fibrillation, was on metoprolol tartrate 50 mg BID and this should be resumed. Started on heparin infusion for atrial fibrillation. On aspirin now and should stop clopidogrel.    Discussed recommendations with Resident on primary team. Notably need adequate beta blocker dosing to control atrial fibrillation

## 2023-04-06 NOTE — PROGRESS NOTE ADULT - PROBLEM SELECTOR PLAN 4
Baseline 12-13, now hemoglobin 8.6, may be component of chronic disease with MARQUEZ.   - Ferritin 10, sat 6% on recent outpatient labs.   - Hgb 7.8 today possibly dilutional in setting of fluids vs bleeding after starting heparin?   - start ferrous sulfate Baseline 12-13, now hemoglobin 8.6, may be component of chronic disease with MARQUEZ.   - Ferritin 10, sat 6% on recent outpatient labs.   - Hgb 7.8 today possibly dilutional in setting of fluids vs bleeding after starting heparin?   - start ferrous sulfate  - f/u repeat CBC at noon

## 2023-04-06 NOTE — PROGRESS NOTE ADULT - PROBLEM SELECTOR PLAN 2
New onset this admission. DARRELL-Vasc 5. Rates up to 140 given IV lopressor 5 x2 rates now 120s.   - TSH wnl  - avoid aggressive beta blockade. Per d/w cards, tachycardia likely compensatory.   - if pt becomes hemodynamically unstable could consider digoxin or amiodarone  - c/w metop 12.5 bid per cards  - heparin gtt started overnight New onset this admission. DARRELL-Vasc 5. Rates up to 140 given IV lopressor 5 x2 rates now 120s.   - TSH wnl  - hemodynamically stable  - resume home dose metop 50 bid to improve rate control  - heparin gtt started overnight

## 2023-04-06 NOTE — PROGRESS NOTE ADULT - PROBLEM SELECTOR PLAN 3
SCr baseline 0.3-0.5. SCr on admission 1.10. Likely prerenal in setting of poor PO intake  - FeNa 0.8% c/w prerenal etiology  - SCr 0.87 today, improving  - ctm while on NSAIDs. Pt also did receive contrast for CTA

## 2023-04-06 NOTE — PROGRESS NOTE ADULT - PROBLEM SELECTOR PLAN 6
- ASA for pericarditis  - pt was on Plavix at home. Last stent in 2019, ok to discontinue - ASA for pericarditis  - pt was reportedly on Plavix at home. Last stent in 2019, should discontinue

## 2023-04-06 NOTE — CHART NOTE - NSCHARTNOTEFT_GEN_A_CORE
Called for tachypnea and SOB. Assessed patient at bedside- tachypneic, belly breathing, HTN to 170s with  (afib). Patient also notably diaphoretic, JVD, but with good pulses and warm extremities. Lungs clear and cardiac exam with pericardial rub and tachycardia. Ordered VBG with lactate, which were WNL. IVF at 75 cc/hour stopped, although she had received ~1.5 L and was net -600 cc. CXR to r/o flash pulm edema was normal. Given that patient had large pericardial effusion on CT and c/f possible development of tamponade physiology, cards fellow called to perform bedside echo. Echo showed normal IVC, normal filling and contraction of all four chambers and no tamponade physiology. Called for tachypnea and SOB. Assessed patient at bedside- tachypneic, belly breathing, HTN to 170s with  (afib). Patient also notably diaphoretic, JVD, but with good pulses and warm extremities. Lungs clear and cardiac exam with pericardial rub and tachycardia. Ordered VBG with lactate, which were WNL. IVF at 75 cc/hour stopped, although she had received ~1.5 L and was net -600 cc. CXR to r/o flash pulm edema was normal. Given that patient had large pericardial effusion on CT and c/f possible development of tamponade physiology, cards fellow called to perform bedside echo. Echo showed normal IVC, normal filling and contraction of all four chambers and no tamponade physiology, small pericardial effusion. Given patient complaining of pleuritic chest pain, will treat pain and reassess.

## 2023-04-07 LAB
ANION GAP SERPL CALC-SCNC: 13 MMOL/L — SIGNIFICANT CHANGE UP (ref 5–17)
APTT BLD: 107.5 SEC — HIGH (ref 27.5–35.5)
APTT BLD: 125.8 SEC — CRITICAL HIGH (ref 27.5–35.5)
APTT BLD: 50.3 SEC — HIGH (ref 27.5–35.5)
BASE EXCESS BLDV CALC-SCNC: -2.9 MMOL/L — LOW (ref -2–3)
BUN SERPL-MCNC: 13 MG/DL — SIGNIFICANT CHANGE UP (ref 7–23)
CA-I SERPL-SCNC: 1.13 MMOL/L — LOW (ref 1.15–1.33)
CALCIUM SERPL-MCNC: 8.4 MG/DL — SIGNIFICANT CHANGE UP (ref 8.4–10.5)
CHLORIDE BLDV-SCNC: 100 MMOL/L — SIGNIFICANT CHANGE UP (ref 96–108)
CHLORIDE SERPL-SCNC: 102 MMOL/L — SIGNIFICANT CHANGE UP (ref 96–108)
CO2 BLDV-SCNC: 25 MMOL/L — SIGNIFICANT CHANGE UP (ref 22–26)
CO2 SERPL-SCNC: 21 MMOL/L — LOW (ref 22–31)
CREAT SERPL-MCNC: 1.16 MG/DL — SIGNIFICANT CHANGE UP (ref 0.5–1.3)
EGFR: 50 ML/MIN/1.73M2 — LOW
GAS PNL BLDV: 131 MMOL/L — LOW (ref 136–145)
GAS PNL BLDV: SIGNIFICANT CHANGE UP
GAS PNL BLDV: SIGNIFICANT CHANGE UP
GLUCOSE BLDC GLUCOMTR-MCNC: 171 MG/DL — HIGH (ref 70–99)
GLUCOSE BLDC GLUCOMTR-MCNC: 255 MG/DL — HIGH (ref 70–99)
GLUCOSE BLDC GLUCOMTR-MCNC: 262 MG/DL — HIGH (ref 70–99)
GLUCOSE BLDC GLUCOMTR-MCNC: 305 MG/DL — HIGH (ref 70–99)
GLUCOSE BLDC GLUCOMTR-MCNC: 315 MG/DL — HIGH (ref 70–99)
GLUCOSE BLDV-MCNC: 260 MG/DL — HIGH (ref 70–99)
GLUCOSE SERPL-MCNC: 219 MG/DL — HIGH (ref 70–99)
HCO3 BLDV-SCNC: 23 MMOL/L — SIGNIFICANT CHANGE UP (ref 22–29)
HCT VFR BLD CALC: 32.7 % — LOW (ref 34.5–45)
HCT VFR BLDA CALC: 29 % — LOW (ref 34.5–46.5)
HGB BLD CALC-MCNC: 9.6 G/DL — LOW (ref 11.7–16.1)
HGB BLD-MCNC: 9.8 G/DL — LOW (ref 11.5–15.5)
LACTATE BLDV-MCNC: 1.1 MMOL/L — SIGNIFICANT CHANGE UP (ref 0.5–2)
MAGNESIUM SERPL-MCNC: 2 MG/DL — SIGNIFICANT CHANGE UP (ref 1.6–2.6)
MCHC RBC-ENTMCNC: 24 PG — LOW (ref 27–34)
MCHC RBC-ENTMCNC: 30 GM/DL — LOW (ref 32–36)
MCV RBC AUTO: 80.1 FL — SIGNIFICANT CHANGE UP (ref 80–100)
NRBC # BLD: 0 /100 WBCS — SIGNIFICANT CHANGE UP (ref 0–0)
PCO2 BLDV: 45 MMHG — HIGH (ref 39–42)
PH BLDV: 7.32 — SIGNIFICANT CHANGE UP (ref 7.32–7.43)
PHOSPHATE SERPL-MCNC: 3.4 MG/DL — SIGNIFICANT CHANGE UP (ref 2.5–4.5)
PLATELET # BLD AUTO: 455 K/UL — HIGH (ref 150–400)
PO2 BLDV: 105 MMHG — HIGH (ref 25–45)
POTASSIUM BLDV-SCNC: 4.3 MMOL/L — SIGNIFICANT CHANGE UP (ref 3.5–5.1)
POTASSIUM SERPL-MCNC: 4.4 MMOL/L — SIGNIFICANT CHANGE UP (ref 3.5–5.3)
POTASSIUM SERPL-SCNC: 4.4 MMOL/L — SIGNIFICANT CHANGE UP (ref 3.5–5.3)
RBC # BLD: 4.08 M/UL — SIGNIFICANT CHANGE UP (ref 3.8–5.2)
RBC # FLD: 17.3 % — HIGH (ref 10.3–14.5)
SAO2 % BLDV: 98.7 % — HIGH (ref 67–88)
SODIUM SERPL-SCNC: 136 MMOL/L — SIGNIFICANT CHANGE UP (ref 135–145)
WBC # BLD: 10.94 K/UL — HIGH (ref 3.8–10.5)
WBC # FLD AUTO: 10.94 K/UL — HIGH (ref 3.8–10.5)

## 2023-04-07 PROCEDURE — 93010 ELECTROCARDIOGRAM REPORT: CPT

## 2023-04-07 PROCEDURE — 99232 SBSQ HOSP IP/OBS MODERATE 35: CPT

## 2023-04-07 PROCEDURE — 99233 SBSQ HOSP IP/OBS HIGH 50: CPT | Mod: GC

## 2023-04-07 RX ORDER — INSULIN LISPRO 100/ML
3 VIAL (ML) SUBCUTANEOUS
Refills: 0 | Status: DISCONTINUED | OUTPATIENT
Start: 2023-04-07 | End: 2023-04-08

## 2023-04-07 RX ORDER — APIXABAN 2.5 MG/1
1 TABLET, FILM COATED ORAL
Qty: 60 | Refills: 0
Start: 2023-04-07 | End: 2023-05-06

## 2023-04-07 RX ORDER — HEPARIN SODIUM 5000 [USP'U]/ML
1400 INJECTION INTRAVENOUS; SUBCUTANEOUS
Qty: 25000 | Refills: 0 | Status: DISCONTINUED | OUTPATIENT
Start: 2023-04-07 | End: 2023-04-08

## 2023-04-07 RX ORDER — BACITRACIN ZINC NEOMYCIN SULFATE POLYMYXIN B SULFATE 400; 3.5; 5 [IU]/G; MG/G; [IU]/G
1 OINTMENT TOPICAL EVERY 6 HOURS
Refills: 0 | Status: DISCONTINUED | OUTPATIENT
Start: 2023-04-07 | End: 2023-04-07

## 2023-04-07 RX ORDER — DICLOFENAC SODIUM 30 MG/G
2 GEL TOPICAL
Refills: 0 | Status: DISCONTINUED | OUTPATIENT
Start: 2023-04-07 | End: 2023-04-11

## 2023-04-07 RX ORDER — OXYCODONE HYDROCHLORIDE 5 MG/1
10 TABLET ORAL EVERY 6 HOURS
Refills: 0 | Status: DISCONTINUED | OUTPATIENT
Start: 2023-04-07 | End: 2023-04-11

## 2023-04-07 RX ORDER — HYDRALAZINE HCL 50 MG
50 TABLET ORAL
Refills: 0 | Status: DISCONTINUED | OUTPATIENT
Start: 2023-04-07 | End: 2023-04-10

## 2023-04-07 RX ORDER — ACETAMINOPHEN 500 MG
650 TABLET ORAL EVERY 6 HOURS
Refills: 0 | Status: DISCONTINUED | OUTPATIENT
Start: 2023-04-07 | End: 2023-04-11

## 2023-04-07 RX ORDER — OXYCODONE HYDROCHLORIDE 5 MG/1
5 TABLET ORAL EVERY 6 HOURS
Refills: 0 | Status: DISCONTINUED | OUTPATIENT
Start: 2023-04-07 | End: 2023-04-11

## 2023-04-07 RX ORDER — ASPIRIN/CALCIUM CARB/MAGNESIUM 324 MG
81 TABLET ORAL DAILY
Refills: 0 | Status: DISCONTINUED | OUTPATIENT
Start: 2023-04-08 | End: 2023-04-11

## 2023-04-07 RX ADMIN — GABAPENTIN 300 MILLIGRAM(S): 400 CAPSULE ORAL at 21:55

## 2023-04-07 RX ADMIN — HEPARIN SODIUM 1400 UNIT(S)/HR: 5000 INJECTION INTRAVENOUS; SUBCUTANEOUS at 07:07

## 2023-04-07 RX ADMIN — Medication 6: at 12:14

## 2023-04-07 RX ADMIN — Medication 2: at 08:29

## 2023-04-07 RX ADMIN — SIMVASTATIN 40 MILLIGRAM(S): 20 TABLET, FILM COATED ORAL at 21:55

## 2023-04-07 RX ADMIN — Medication 50 MILLIGRAM(S): at 12:13

## 2023-04-07 RX ADMIN — Medication 650 MILLIGRAM(S): at 18:42

## 2023-04-07 RX ADMIN — Medication 650 MILLIGRAM(S): at 05:13

## 2023-04-07 RX ADMIN — HYDROMORPHONE HYDROCHLORIDE 0.25 MILLIGRAM(S): 2 INJECTION INTRAMUSCULAR; INTRAVENOUS; SUBCUTANEOUS at 00:07

## 2023-04-07 RX ADMIN — Medication 6: at 17:42

## 2023-04-07 RX ADMIN — Medication 50 MILLIGRAM(S): at 05:13

## 2023-04-07 RX ADMIN — Medication 0.6 MILLIGRAM(S): at 05:11

## 2023-04-07 RX ADMIN — Medication 50 MILLIGRAM(S): at 17:41

## 2023-04-07 RX ADMIN — GABAPENTIN 300 MILLIGRAM(S): 400 CAPSULE ORAL at 12:13

## 2023-04-07 RX ADMIN — HEPARIN SODIUM 1300 UNIT(S)/HR: 5000 INJECTION INTRAVENOUS; SUBCUTANEOUS at 14:11

## 2023-04-07 RX ADMIN — PANTOPRAZOLE SODIUM 40 MILLIGRAM(S): 20 TABLET, DELAYED RELEASE ORAL at 05:12

## 2023-04-07 RX ADMIN — Medication 50 MILLIGRAM(S): at 18:05

## 2023-04-07 RX ADMIN — Medication 0.6 MILLIGRAM(S): at 17:41

## 2023-04-07 RX ADMIN — Medication 3 UNIT(S): at 17:42

## 2023-04-07 RX ADMIN — Medication 4: at 21:55

## 2023-04-07 RX ADMIN — GABAPENTIN 300 MILLIGRAM(S): 400 CAPSULE ORAL at 05:10

## 2023-04-07 RX ADMIN — HEPARIN SODIUM 1400 UNIT(S)/HR: 5000 INJECTION INTRAVENOUS; SUBCUTANEOUS at 22:48

## 2023-04-07 RX ADMIN — HEPARIN SODIUM 1300 UNIT(S)/HR: 5000 INJECTION INTRAVENOUS; SUBCUTANEOUS at 19:30

## 2023-04-07 RX ADMIN — Medication 650 MILLIGRAM(S): at 17:49

## 2023-04-07 NOTE — PROGRESS NOTE ADULT - PROBLEM SELECTOR PLAN 1
Recent viral illness last week? Now with worsening chest pain, concern for pericarditis after recent URI.   - limited TTE on admission shows moderate pericardial effusion with no tamponade physiology  - CRP elevated (286), proBNP elevated (2285). Trops wnl  - c/w aspirin 650 q8h  - c/w colchicine 0.6 q12h  - repeat full TTE 4/6 shows small pericardial effusion, no hemodynamic compromise  - pain control  - cards recs appreciated; no need for urgent drainage per cards given hemodynamic stability Recent viral illness last week? Now with worsening chest pain, concern for pericarditis after recent URI.   - limited TTE on admission shows moderate pericardial effusion with no tamponade physiology  - CRP elevated (286), proBNP elevated (2285). Trops wnl  - started on  q8h       - discontinue high dose ASA today, plan to resume ASA 81 tmw  - c/w colchicine 0.6 q12h  - repeat full TTE 4/6 shows small pericardial effusion, no hemodynamic compromise  - pain control: Tylenol prn, oxy for severe pain  - cards recs appreciated; no need for urgent drainage per cards given hemodynamic stability

## 2023-04-07 NOTE — PHYSICAL THERAPY INITIAL EVALUATION ADULT - PLANNED THERAPY INTERVENTIONS, PT EVAL
Pt will negotiate 5 steps independently to enter the home within 2 weeks./balance training/gait training

## 2023-04-07 NOTE — PROGRESS NOTE ADULT - PROBLEM SELECTOR PLAN 6
- ASA for pericarditis  - pt was reportedly on Plavix at home. Last stent in 2019, should discontinue - plan to resume daily ASA 81 tmw  - pt was reportedly on Plavix at home. Last stent in 2019, should discontinue

## 2023-04-07 NOTE — PHYSICAL THERAPY INITIAL EVALUATION ADULT - ADDITIONAL COMMENTS
Pt reports living in private home with 5 steps to enter with bilateral handrails. Pt reports having help in the home if needed, however the person works and is out of the house up to 10 hours on workdays. Pt reports owning RW but was independent with all mobility and ADLs without AD prior to admission.

## 2023-04-07 NOTE — PROGRESS NOTE ADULT - PROBLEM SELECTOR PLAN 2
New onset this admission. DARRELL-Vasc 5. Rates up to 140 given IV lopressor 5 x2 rates now 120s.   - TSH wnl  - hemodynamically stable  - resumed home dose metop 50 bid  - c/w heparin gtt  - improved rate control today New onset this admission. DARRELL-Vasc 5. Rates up to 140 given IV lopressor 5 x2 rates now 120s.   - TSH wnl  - hemodynamically stable  - resumed home dose metop 50 bid  - c/w heparin gtt       - plan to transition to Eliquis tmw   - improved rate control today New onset this admission. DARRELL-Vasc 5. Rates up to 140 given IV lopressor 5 x2 rates now 120s.   - TSH wnl  - hemodynamically stable  - resumed home dose metop 50 bid  - c/w heparin gtt       - plan to transition to Eliquis tmw as no further procedures are planned  - improved rate control today

## 2023-04-07 NOTE — PROGRESS NOTE ADULT - PROBLEM SELECTOR PLAN 3
SCr baseline 0.3-0.5. SCr on admission 1.10. Likely prerenal in setting of poor PO intake  - FeNa 0.8% c/w prerenal etiology  - SCr 1.16 today  - ctm while on NSAIDs. Pt also did receive contrast for CTA

## 2023-04-07 NOTE — PROGRESS NOTE ADULT - PROBLEM SELECTOR PLAN 5
Sodium 128 on admission, suspect in setting of poor PO intake.   - s/p NS  - Na 136 this morning Sodium 128 on admission, suspect in setting of poor PO intake.   - s/p NS  - Na 136 this morning  - resolved

## 2023-04-07 NOTE — PROGRESS NOTE ADULT - SUBJECTIVE AND OBJECTIVE BOX
Patient is a 72y old  Female who presents with a chief complaint of Chest pain (2023 07:21)      SUBJECTIVE / OVERNIGHT EVENTS:    MEDICATIONS  (STANDING):  aspirin 650 milliGRAM(s) Oral every 8 hours  colchicine 0.6 milliGRAM(s) Oral every 12 hours  dextrose 5%. 1000 milliLiter(s) (50 mL/Hr) IV Continuous <Continuous>  dextrose 5%. 1000 milliLiter(s) (100 mL/Hr) IV Continuous <Continuous>  dextrose 50% Injectable 12.5 Gram(s) IV Push once  dextrose 50% Injectable 25 Gram(s) IV Push once  dextrose 50% Injectable 25 Gram(s) IV Push once  ferrous    sulfate 325 milliGRAM(s) Oral <User Schedule>  gabapentin 300 milliGRAM(s) Oral three times a day  glucagon  Injectable 1 milliGRAM(s) IntraMuscular once  heparin  Infusion. 1400 Unit(s)/Hr (14 mL/Hr) IV Continuous <Continuous>  insulin lispro (ADMELOG) corrective regimen sliding scale   SubCutaneous three times a day before meals  insulin lispro (ADMELOG) corrective regimen sliding scale   SubCutaneous at bedtime  metoprolol tartrate 50 milliGRAM(s) Oral every 12 hours  pantoprazole    Tablet 40 milliGRAM(s) Oral before breakfast  simvastatin 40 milliGRAM(s) Oral at bedtime  sodium chloride 0.9%. 1000 milliLiter(s) (75 mL/Hr) IV Continuous <Continuous>    MEDICATIONS  (PRN):  dextrose Oral Gel 15 Gram(s) Oral once PRN Blood Glucose LESS THAN 70 milliGRAM(s)/deciliter  heparin   Injectable 5000 Unit(s) IV Push every 6 hours PRN For aPTT less than 40  heparin   Injectable 2500 Unit(s) IV Push every 6 hours PRN For aPTT between 40 - 57      T(C): 36.6 (23 @ 08:15), Max: 37.2 (23 @ 21:53)  HR: 81 (23 @ 08:15) (81 - 134)  BP: 125/76 (23 @ 08:15) (125/76 - 178/84)  RR: 18 (23 @ 08:15) (16 - 26)  SpO2: 95% (23 @ 08:30) (80% - 100%)  CAPILLARY BLOOD GLUCOSE      POCT Blood Glucose.: 171 mg/dL (2023 08:10)  POCT Blood Glucose.: 226 mg/dL (2023 21:09)  POCT Blood Glucose.: 237 mg/dL (2023 17:24)  POCT Blood Glucose.: 138 mg/dL (2023 12:00)    I&O's Summary    2023 07:01  -  2023 07:00  --------------------------------------------------------  IN: 240 mL / OUT: 0 mL / NET: 240 mL        PHYSICAL EXAM:  PHYSICAL EXAM:    Constitutional: NAD. well-developed; well-groomed; well-nourished;  HEENT: AT/NC, PERRLA; EOMI, MMM, no oropharyngeal lesions, no erythema, no exudates, Supple neck; normal thyroid gland, no cervical lymphadenopathy  Respiratory: CTAB. equal aeration bilaterally. no wheezing, no crackes, no rhonchi. no increase in WOB  Cardiovascular: RRR, no M/R/G. 2+ distal pulses. Cap refill < 2 seconds. no JVD  Gastrointestinal: soft; NT/ND, +BS, no rebounding tenderness / guarding / HSM / mass / ascites.  Genitourinary: not examined.  Extremities: no clubbing; no cyanosis; no pedal edema, non-tender to palpation, DP and Radial pulses intact.  Skin: warm and dry; color normal: no rash: no ulcers  Neurological: A&Ox 3; responds to pain; responds to verbal commands; epicritic and protopathic sensation intact: CN nerves grossly intact.   MSK/Back: no deformities. Active and passive ROM intact; strength intact, no CVA tenderness, No joint tenderness, swelling, erythema  Psychiatric: normal mood/affect. Denies SI/HI    LABS:                        9.8    10.94 )-----------( 455      ( 2023 05:58 )             32.7     04-07    136  |  102  |  13  ----------------------------<  219<H>  4.4   |  21<L>  |  1.16    Ca    8.4      2023 05:58  Phos  3.4     04-07  Mg     2.0     04-07    TPro  5.5<L>  /  Alb  2.2<L>  /  TBili  0.1<L>  /  DBili  x   /  AST  18  /  ALT  14  /  AlkPhos  116  04-06    PT/INR - ( 2023 21:55 )   PT: 14.1 sec;   INR: 1.21 ratio         PTT - ( 2023 05:58 )  PTT:125.8 sec      Urinalysis Basic - ( 2023 04:09 )    Color: Colorless / Appearance: Clear / S.012 / pH: x  Gluc: x / Ketone: Negative  / Bili: Negative / Urobili: Negative   Blood: x / Protein: 100 mg/dL / Nitrite: Negative   Leuk Esterase: Negative / RBC: 389 /hpf / WBC 10 /HPF   Sq Epi: x / Non Sq Epi: x / Bacteria: Negative        RADIOLOGY & ADDITIONAL TESTS:    Imaging Personally Reviewed: MIKE    Consultant(s) Notes Reviewed:  NA    Care Discussed with Consultants/Other Providers: MIKE   Patient is a 72y old  Female who presents with a chief complaint of Chest pain (2023 07:21)      SUBJECTIVE / OVERNIGHT EVENTS:  patient had episode of tachypnea associated with diaphoresis and chest tightness overnight w/ hypoxic resp failure on room air improving w/ supp O2.  Overnight cards fellow performed bedside echo showing collapsing IVC which is reassuring that it is not tamponade.  This AM, patient feels much better with small residual pleuritic chest pain.    tele: afib.      MEDICATIONS  (STANDING):  aspirin 650 milliGRAM(s) Oral every 8 hours  colchicine 0.6 milliGRAM(s) Oral every 12 hours  dextrose 5%. 1000 milliLiter(s) (50 mL/Hr) IV Continuous <Continuous>  dextrose 5%. 1000 milliLiter(s) (100 mL/Hr) IV Continuous <Continuous>  dextrose 50% Injectable 12.5 Gram(s) IV Push once  dextrose 50% Injectable 25 Gram(s) IV Push once  dextrose 50% Injectable 25 Gram(s) IV Push once  ferrous    sulfate 325 milliGRAM(s) Oral <User Schedule>  gabapentin 300 milliGRAM(s) Oral three times a day  glucagon  Injectable 1 milliGRAM(s) IntraMuscular once  heparin  Infusion. 1400 Unit(s)/Hr (14 mL/Hr) IV Continuous <Continuous>  insulin lispro (ADMELOG) corrective regimen sliding scale   SubCutaneous three times a day before meals  insulin lispro (ADMELOG) corrective regimen sliding scale   SubCutaneous at bedtime  metoprolol tartrate 50 milliGRAM(s) Oral every 12 hours  pantoprazole    Tablet 40 milliGRAM(s) Oral before breakfast  simvastatin 40 milliGRAM(s) Oral at bedtime  sodium chloride 0.9%. 1000 milliLiter(s) (75 mL/Hr) IV Continuous <Continuous>    MEDICATIONS  (PRN):  dextrose Oral Gel 15 Gram(s) Oral once PRN Blood Glucose LESS THAN 70 milliGRAM(s)/deciliter  heparin   Injectable 5000 Unit(s) IV Push every 6 hours PRN For aPTT less than 40  heparin   Injectable 2500 Unit(s) IV Push every 6 hours PRN For aPTT between 40 - 57      T(C): 36.6 (23 @ 08:15), Max: 37.2 (23 @ 21:53)  HR: 81 (23 @ 08:15) (81 - 134)  BP: 125/76 (23 @ 08:15) (125/76 - 178/84)  RR: 18 (23 @ 08:15) (16 - 26)  SpO2: 95% (23 @ 08:30) (80% - 100%)  CAPILLARY BLOOD GLUCOSE      POCT Blood Glucose.: 171 mg/dL (2023 08:10)  POCT Blood Glucose.: 226 mg/dL (2023 21:09)  POCT Blood Glucose.: 237 mg/dL (2023 17:24)  POCT Blood Glucose.: 138 mg/dL (2023 12:00)    I&O's Summary    2023 07:01  -  2023 07:00  --------------------------------------------------------  IN: 240 mL / OUT: 0 mL / NET: 240 mL        PHYSICAL EXAM:  GENERAL: No acute distress, well-developed  HEAD:  Atraumatic, Normocephalic  ENT: EOMI, PERRLA, conjunctiva and sclera clear, Neck supple, No JVD, moist mucosa  CHEST/LUNG: Clear to auscultation bilaterally; No wheeze, equal breath sounds bilaterally   BACK: No spinal tenderness  HEART: Regular rate and rhythm; No murmurs, rubs, or gallops  ABDOMEN: Soft, Nontender, Nondistended; Bowel sounds present  EXTREMITIES:  No clubbing, cyanosis, or edema  PSYCH: Nl behavior, nl affect  NEUROLOGY: AAOx3, non-focal, cranial nerves intact  SKIN: Normal color, No rashes or lesions    LABS:                        9.8    10.94 )-----------( 455      ( 2023 05:58 )             32.7     04-07    136  |  102  |  13  ----------------------------<  219<H>  4.4   |  21<L>  |  1.16    Ca    8.4      2023 05:58  Phos  3.4     04-07  Mg     2.0     04-07    TPro  5.5<L>  /  Alb  2.2<L>  /  TBili  0.1<L>  /  DBili  x   /  AST  18  /  ALT  14  /  AlkPhos  116  04-06    PT/INR - ( 2023 21:55 )   PT: 14.1 sec;   INR: 1.21 ratio         PTT - ( 2023 05:58 )  PTT:125.8 sec      Urinalysis Basic - ( 2023 04:09 )    Color: Colorless / Appearance: Clear / S.012 / pH: x  Gluc: x / Ketone: Negative  / Bili: Negative / Urobili: Negative   Blood: x / Protein: 100 mg/dL / Nitrite: Negative   Leuk Esterase: Negative / RBC: 389 /hpf / WBC 10 /HPF   Sq Epi: x / Non Sq Epi: x / Bacteria: Negative        RADIOLOGY & ADDITIONAL TESTS:    Imaging Personally Reviewed: MIKE    Consultant(s) Notes Reviewed:  MIKE    Care Discussed with Consultants/Other Providers: MIKE

## 2023-04-07 NOTE — PHYSICAL THERAPY INITIAL EVALUATION ADULT - PERTINENT HX OF CURRENT PROBLEM, REHAB EVAL
72-year-old female with a history of diabetes, CAD s/p stent (2019), neuro aortic thrombus for which no anticoagulation was felt to be necessary in the past, hypertension, and type 2 diabetes who is presenting with acute worsening chest pain over the past 3 days. She reports chest pressure ongoing for 2 weeks with subjective fever last week. Also having poor intake during this time. Pain is located anteriorly and radiates to her sides. Worsens when she takes a deep breath or lies flat. Improves when she sits up and leans forward. Patient also reports losing consciousness 2 days ago at home, unwitnessed, no fall. Saw PCP today who gave the patient 4 aspirin and sent her to ED. Denies lightheadedness, palpitations, abdominal pain, nausea, vomiting, changes in urination or bowel movement, or leg swelling. No recent changes to medications, reports compliance with current regimen. 72-year-old female with a history of diabetes, CAD s/p stent (2019), neuro aortic thrombus for which no anticoagulation was felt to be necessary in the past, hypertension, and type 2 diabetes who is presenting with acute worsening chest pain over the past 3 days. She reports chest pressure ongoing for 2 weeks with subjective fever last week. Also having poor intake during this time. Pain is located anteriorly and radiates to her sides. Worsens when she takes a deep breath or lies flat. Improves when she sits up and leans forward. Patient also reports losing consciousness 2 days ago at home, unwitnessed, no fall. Saw PCP today who gave the patient 4 aspirin and sent her to ED. Denies lightheadedness, palpitations, abdominal pain, nausea, vomiting, changes in urination or bowel movement, or leg swelling. No recent changes to medications, reports compliance with current regimen.    Chest Xray 4/5:FINDINGS:  Lines/Devices: None.  Heart/Vascular: The heart is enlarged.  Pulmonary: The right lung is clear. Left pleural effusion/atelectasis. No   pneumothorax.  Bones: No acute findings.

## 2023-04-07 NOTE — PROVIDER CONTACT NOTE (OTHER) - BACKGROUND
Dx: Disease of pericardium
Patient's HR has been consistently >100
Patient has hx of 4 beats wct
scheduled metoprolol 50 mg administered
Pt admitted for pericardial effusion
pt admitted for large pericardial effusion

## 2023-04-07 NOTE — PROGRESS NOTE ADULT - PROBLEM SELECTOR PLAN 8
Pt on enalapril 20 bid, clonidine 0.1 bid, hydral 50 bid at home  - hold antihypertensives for now, can restart gradually as tolerated Pt on enalapril 20 bid, clonidine 0.1 bid, hydral 50 bid at home  - restart hydral 50 bid today  - restart other home antihypertensives gradually as tolerated

## 2023-04-07 NOTE — PROGRESS NOTE ADULT - SUBJECTIVE AND OBJECTIVE BOX
DATE OF SERVICE: 04-07-23 @ 07:21    Patient is a 72y old  Female who presents with a chief complaint of Chest pain (06 Apr 2023 07:22)      SUBJECTIVE / OVERNIGHT EVENTS:  Pt noted to be tachypneic & diaphoretic overnight. Hypoxic to 80% on room air early in the evening. Placed on 2L NC  EKG, CXR, VBG showed no changes. Bedside echo showed small pericardial effusion w/ no tamponade physiology. Tachypnea likely in setting of pleuritic chest pain. S/p Dilaudid x1.  Afebrile, blood pressures moderately elevated (140s-160s systolic)  ***    MEDICATIONS  (STANDING):  aspirin 650 milliGRAM(s) Oral every 8 hours  colchicine 0.6 milliGRAM(s) Oral every 12 hours  dextrose 5%. 1000 milliLiter(s) (50 mL/Hr) IV Continuous <Continuous>  dextrose 5%. 1000 milliLiter(s) (100 mL/Hr) IV Continuous <Continuous>  dextrose 50% Injectable 25 Gram(s) IV Push once  dextrose 50% Injectable 12.5 Gram(s) IV Push once  dextrose 50% Injectable 25 Gram(s) IV Push once  ferrous    sulfate 325 milliGRAM(s) Oral <User Schedule>  gabapentin 300 milliGRAM(s) Oral three times a day  glucagon  Injectable 1 milliGRAM(s) IntraMuscular once  heparin  Infusion. 1400 Unit(s)/Hr (14 mL/Hr) IV Continuous <Continuous>  insulin lispro (ADMELOG) corrective regimen sliding scale   SubCutaneous three times a day before meals  insulin lispro (ADMELOG) corrective regimen sliding scale   SubCutaneous at bedtime  metoprolol tartrate 50 milliGRAM(s) Oral every 12 hours  pantoprazole    Tablet 40 milliGRAM(s) Oral before breakfast  simvastatin 40 milliGRAM(s) Oral at bedtime  sodium chloride 0.9%. 1000 milliLiter(s) (75 mL/Hr) IV Continuous <Continuous>    MEDICATIONS  (PRN):  dextrose Oral Gel 15 Gram(s) Oral once PRN Blood Glucose LESS THAN 70 milliGRAM(s)/deciliter  heparin   Injectable 5000 Unit(s) IV Push every 6 hours PRN For aPTT less than 40  heparin   Injectable 2500 Unit(s) IV Push every 6 hours PRN For aPTT between 40 - 57      Vital Signs Last 24 Hrs  T(C): 36.4 (07 Apr 2023 04:29), Max: 37.2 (06 Apr 2023 21:53)  T(F): 97.5 (07 Apr 2023 04:29), Max: 99 (06 Apr 2023 21:53)  HR: 92 (07 Apr 2023 04:29) (92 - 134)  BP: 145/92 (07 Apr 2023 04:29) (139/74 - 178/84)  BP(mean): --  RR: 18 (07 Apr 2023 04:29) (16 - 26)  SpO2: 100% (07 Apr 2023 04:29) (80% - 100%)    Parameters below as of 07 Apr 2023 00:23  Patient On (Oxygen Delivery Method): nasal cannula  O2 Flow (L/min): 2    CAPILLARY BLOOD GLUCOSE  POCT Blood Glucose.: 226 mg/dL (06 Apr 2023 21:09)  POCT Blood Glucose.: 237 mg/dL (06 Apr 2023 17:24)  POCT Blood Glucose.: 138 mg/dL (06 Apr 2023 12:00)  POCT Blood Glucose.: 170 mg/dL (06 Apr 2023 07:54)    I&O's Summary    06 Apr 2023 07:01  -  07 Apr 2023 07:00  --------------------------------------------------------  IN: 240 mL / OUT: 0 mL / NET: 240 mL        PHYSICAL EXAM:  GENERAL: No apparent distress, seated upright in bed  HEAD:  Atraumatic, normocephalic  EYES: EOMI, PERRLA, conjunctiva and sclera clear  NECK: Supple, no JVD  CHEST/LUNG: Clear to auscultation bilaterally; No wheeze, rales, rhonchi. Appears mildly labored on room air  HEART: Fast, irregular rate & rhythm. No murmurs or rubs  ABDOMEN: Soft, nontender, nondistended; Bowel sounds present  EXTREMITIES:  No LE edema  PSYCH: AAOx3  NEUROLOGY: No focal deficits  SKIN: No rashes or lesions      LABS:                        9.8    10.94 )-----------( 455      ( 07 Apr 2023 05:58 )             32.7     04-07    136  |  102  |  13  ----------------------------<  219<H>  4.4   |  21<L>  |  1.16    Ca    8.4      07 Apr 2023 05:58  Phos  3.4     04-07  Mg     2.0     04-07     PTT - ( 07 Apr 2023 05:58 )  PTT:125.8 sec      RADIOLOGY & ADDITIONAL TESTS:  Reviewed     DATE OF SERVICE: 04-07-23 @ 07:21    Patient is a 72y old  Female who presents with a chief complaint of Chest pain (06 Apr 2023 07:22)      SUBJECTIVE / OVERNIGHT EVENTS:  Pt noted to be tachypneic & diaphoretic overnight. Hypoxic to 80% on room air early in the evening. Placed on 2L NC  EKG, CXR, VBG showed no changes. Bedside echo showed small pericardial effusion w/ no tamponade physiology. Tachypnea likely in setting of pleuritic chest pain. S/p Dilaudid x1.  Afebrile, blood pressures moderately elevated (140s-160s systolic). Afib 80s-90s on tele.   ***    MEDICATIONS  (STANDING):  aspirin 650 milliGRAM(s) Oral every 8 hours  colchicine 0.6 milliGRAM(s) Oral every 12 hours  dextrose 5%. 1000 milliLiter(s) (50 mL/Hr) IV Continuous <Continuous>  dextrose 5%. 1000 milliLiter(s) (100 mL/Hr) IV Continuous <Continuous>  dextrose 50% Injectable 25 Gram(s) IV Push once  dextrose 50% Injectable 12.5 Gram(s) IV Push once  dextrose 50% Injectable 25 Gram(s) IV Push once  ferrous    sulfate 325 milliGRAM(s) Oral <User Schedule>  gabapentin 300 milliGRAM(s) Oral three times a day  glucagon  Injectable 1 milliGRAM(s) IntraMuscular once  heparin  Infusion. 1400 Unit(s)/Hr (14 mL/Hr) IV Continuous <Continuous>  insulin lispro (ADMELOG) corrective regimen sliding scale   SubCutaneous three times a day before meals  insulin lispro (ADMELOG) corrective regimen sliding scale   SubCutaneous at bedtime  metoprolol tartrate 50 milliGRAM(s) Oral every 12 hours  pantoprazole    Tablet 40 milliGRAM(s) Oral before breakfast  simvastatin 40 milliGRAM(s) Oral at bedtime  sodium chloride 0.9%. 1000 milliLiter(s) (75 mL/Hr) IV Continuous <Continuous>    MEDICATIONS  (PRN):  dextrose Oral Gel 15 Gram(s) Oral once PRN Blood Glucose LESS THAN 70 milliGRAM(s)/deciliter  heparin   Injectable 5000 Unit(s) IV Push every 6 hours PRN For aPTT less than 40  heparin   Injectable 2500 Unit(s) IV Push every 6 hours PRN For aPTT between 40 - 57      Vital Signs Last 24 Hrs  T(C): 36.4 (07 Apr 2023 04:29), Max: 37.2 (06 Apr 2023 21:53)  T(F): 97.5 (07 Apr 2023 04:29), Max: 99 (06 Apr 2023 21:53)  HR: 92 (07 Apr 2023 04:29) (92 - 134)  BP: 145/92 (07 Apr 2023 04:29) (139/74 - 178/84)  BP(mean): --  RR: 18 (07 Apr 2023 04:29) (16 - 26)  SpO2: 100% (07 Apr 2023 04:29) (80% - 100%)    Parameters below as of 07 Apr 2023 00:23  Patient On (Oxygen Delivery Method): nasal cannula  O2 Flow (L/min): 2    CAPILLARY BLOOD GLUCOSE  POCT Blood Glucose.: 226 mg/dL (06 Apr 2023 21:09)  POCT Blood Glucose.: 237 mg/dL (06 Apr 2023 17:24)  POCT Blood Glucose.: 138 mg/dL (06 Apr 2023 12:00)  POCT Blood Glucose.: 170 mg/dL (06 Apr 2023 07:54)    I&O's Summary    06 Apr 2023 07:01  -  07 Apr 2023 07:00  --------------------------------------------------------  IN: 240 mL / OUT: 0 mL / NET: 240 mL        PHYSICAL EXAM:  GENERAL: No apparent distress, seated upright in bed  HEAD:  Atraumatic, normocephalic  EYES: EOMI, PERRLA, conjunctiva and sclera clear  NECK: Supple, no JVD  CHEST/LUNG: Clear to auscultation bilaterally; No wheeze, rales, rhonchi. Appears mildly labored on room air  HEART: Fast, irregular rate & rhythm. No murmurs or rubs  ABDOMEN: Soft, nontender, nondistended; Bowel sounds present  EXTREMITIES:  No LE edema  PSYCH: AAOx3  NEUROLOGY: No focal deficits  SKIN: No rashes or lesions      LABS:                        9.8    10.94 )-----------( 455      ( 07 Apr 2023 05:58 )             32.7     04-07    136  |  102  |  13  ----------------------------<  219<H>  4.4   |  21<L>  |  1.16    Ca    8.4      07 Apr 2023 05:58  Phos  3.4     04-07  Mg     2.0     04-07     PTT - ( 07 Apr 2023 05:58 )  PTT:125.8 sec      RADIOLOGY & ADDITIONAL TESTS:  Reviewed     DATE OF SERVICE: 04-07-23 @ 07:21    Patient is a 72y old  Female who presents with a chief complaint of Chest pain (06 Apr 2023 07:22)      SUBJECTIVE / OVERNIGHT EVENTS:  Pt noted to be tachypneic & diaphoretic overnight. Hypoxic to 80% on room air early in the evening. Placed on 2L NC  EKG, CXR, VBG showed no changes. Bedside echo showed small pericardial effusion w/ no tamponade physiology. Tachypnea likely in setting of pleuritic chest pain. S/p Dilaudid x1.  Afebrile, blood pressures moderately elevated (140s-160s systolic). Afib 80s-90s on tele.   Pt reports improved pleuritic chest pain this morning. Denies SOB, fever, chills    MEDICATIONS  (STANDING):  aspirin 650 milliGRAM(s) Oral every 8 hours  colchicine 0.6 milliGRAM(s) Oral every 12 hours  dextrose 5%. 1000 milliLiter(s) (50 mL/Hr) IV Continuous <Continuous>  dextrose 5%. 1000 milliLiter(s) (100 mL/Hr) IV Continuous <Continuous>  dextrose 50% Injectable 25 Gram(s) IV Push once  dextrose 50% Injectable 12.5 Gram(s) IV Push once  dextrose 50% Injectable 25 Gram(s) IV Push once  ferrous    sulfate 325 milliGRAM(s) Oral <User Schedule>  gabapentin 300 milliGRAM(s) Oral three times a day  glucagon  Injectable 1 milliGRAM(s) IntraMuscular once  heparin  Infusion. 1400 Unit(s)/Hr (14 mL/Hr) IV Continuous <Continuous>  insulin lispro (ADMELOG) corrective regimen sliding scale   SubCutaneous three times a day before meals  insulin lispro (ADMELOG) corrective regimen sliding scale   SubCutaneous at bedtime  metoprolol tartrate 50 milliGRAM(s) Oral every 12 hours  pantoprazole    Tablet 40 milliGRAM(s) Oral before breakfast  simvastatin 40 milliGRAM(s) Oral at bedtime  sodium chloride 0.9%. 1000 milliLiter(s) (75 mL/Hr) IV Continuous <Continuous>    MEDICATIONS  (PRN):  dextrose Oral Gel 15 Gram(s) Oral once PRN Blood Glucose LESS THAN 70 milliGRAM(s)/deciliter  heparin   Injectable 5000 Unit(s) IV Push every 6 hours PRN For aPTT less than 40  heparin   Injectable 2500 Unit(s) IV Push every 6 hours PRN For aPTT between 40 - 57      Vital Signs Last 24 Hrs  T(C): 36.4 (07 Apr 2023 04:29), Max: 37.2 (06 Apr 2023 21:53)  T(F): 97.5 (07 Apr 2023 04:29), Max: 99 (06 Apr 2023 21:53)  HR: 92 (07 Apr 2023 04:29) (92 - 134)  BP: 145/92 (07 Apr 2023 04:29) (139/74 - 178/84)  BP(mean): --  RR: 18 (07 Apr 2023 04:29) (16 - 26)  SpO2: 100% (07 Apr 2023 04:29) (80% - 100%)    Parameters below as of 07 Apr 2023 00:23  Patient On (Oxygen Delivery Method): nasal cannula  O2 Flow (L/min): 2    CAPILLARY BLOOD GLUCOSE  POCT Blood Glucose.: 226 mg/dL (06 Apr 2023 21:09)  POCT Blood Glucose.: 237 mg/dL (06 Apr 2023 17:24)  POCT Blood Glucose.: 138 mg/dL (06 Apr 2023 12:00)  POCT Blood Glucose.: 170 mg/dL (06 Apr 2023 07:54)    I&O's Summary    06 Apr 2023 07:01  -  07 Apr 2023 07:00  --------------------------------------------------------  IN: 240 mL / OUT: 0 mL / NET: 240 mL        PHYSICAL EXAM:  GENERAL: No apparent distress  HEAD:  Atraumatic, normocephalic  EYES: EOMI, PERRLA, conjunctiva and sclera clear  NECK: Supple, no JVD  CHEST/LUNG: Clear to auscultation bilaterally; No wheeze, rales, rhonchi. Non-labored appearance on room air  HEART: Irregular rate & rhythm. No murmurs or rubs  ABDOMEN: Soft, nontender, nondistended; Bowel sounds present  EXTREMITIES:  No LE edema  PSYCH: AAOx3  NEUROLOGY: No focal deficits  SKIN: No rashes or lesions      LABS:                        9.8    10.94 )-----------( 455      ( 07 Apr 2023 05:58 )             32.7     04-07    136  |  102  |  13  ----------------------------<  219<H>  4.4   |  21<L>  |  1.16    Ca    8.4      07 Apr 2023 05:58  Phos  3.4     04-07  Mg     2.0     04-07     PTT - ( 07 Apr 2023 05:58 )  PTT:125.8 sec      RADIOLOGY & ADDITIONAL TESTS:  Reviewed

## 2023-04-07 NOTE — PROGRESS NOTE ADULT - ASSESSMENT
71 yo F with a PMH of CAD s/p Stent to RPDA 2019, HTN, HLD, DM2 (on oral meds), follows with Cards- Dr. Pearce (last saw him 3 mo ago) p/w pleuritic chest pain x 3-4 days a/w SOB.  CT chest shows large pericardial effusion. Echo shows moderate pericardial effusion with no evidence of tamponade. EKG does not show signs of pericarditis or ischemia.  Vitals are stable at this time.     #Pleuritic Chest pain likely pericarditis  #Large pericardial effusion  Most likely in the setting of pericarditis as the patient recently had a fever and is having typical symptoms of pericarditis.  -TTE on 4/5 showed moderate sized pericardial effusion with no tamponade physiology.  -rTTE on 4/6 showed diminishing pericardial effusion with no tamponade physiology.  -Patient is currently hemodynamically stable and symptom has much improved.  -continue treatment of patient with aspirin 650mg q8h and colchicine 0.6mg BID. Consider stopping ASA and monitor how her symptoms are suppressed solely on colchicine as patient will need AC given her AFib w/ elevated [id number] score of 4.  -Pantoprazole 40mg PO daily    # AFib  - on metop tartrate 50mg BID  - currently rate controlled on tele  - on hep gtt. Consider transition to NOAC if there is no further procedures planned.    #CAD  -Aspirin as above  -Can stop plavix as stent is from 2019  -Continue statin   73 yo F with a PMH of CAD s/p Stent to RPDA 2019, HTN, HLD, DM2 (on oral meds), follows with Cards- Dr. Pearce (last saw him 3 mo ago) p/w pleuritic chest pain x 3-4 days a/w SOB.  CT chest shows large pericardial effusion. Echo shows moderate pericardial effusion with no evidence of tamponade. EKG does not show signs of pericarditis or ischemia.  Vitals are stable at this time.     #Pleuritic Chest pain likely pericarditis  #Large pericardial effusion  Most likely in the setting of pericarditis as the patient recently had a fever and is having typical symptoms of pericarditis.  -TTE on 4/5 showed moderate sized pericardial effusion with no tamponade physiology.  -rTTE on 4/6 showed diminishing pericardial effusion with no tamponade physiology.  -Patient is currently hemodynamically stable and symptom has much improved.  -continue treatment of patient with aspirin 650mg q8h and colchicine 0.6mg BID. Consider stopping ASA and monitor how her symptoms are suppressed solely on colchicine as patient will need AC given her AFib w/ elevated [id number] score of 4.  -Pantoprazole 40mg PO daily    # AFib  - on metop tartrate 50mg BID  - currently rate controlled on tele  - on hep gtt. Consider transition to NOAC if there is no further procedures planned.    #CAD  -Aspirin as above  -Can stop plavix as stent is from 2019  -Continue statin    This patient was seen and examined personally by me and the plan was discussed with the fellow and/or resident above. Amendments were made as necessary to the above. Agree with the excellent note and plan above. NOAC transition.    Epifanio Mahoney MD, MPhil, [facility]  Cardiologist, [facility]  ; [facility] and [facility]  Email: [email or web address] and Cardiovascular Surgery on-service contact/call information, go to [email or web address] and use "[employer]" to login.  Outpatient Cardiology appointments, call [phone] to arrange with a colleague; I do not have outpatient Cardiology clinic. 71 yo F with a PMH of CAD s/p Stent to RPDA 2019, HTN, HLD, DM2 (on oral meds), follows with Cards- Dr. Pearce (last saw him 3 mo ago) p/w pleuritic chest pain x 3-4 days a/w SOB.  CT chest shows large pericardial effusion. Echo shows moderate pericardial effusion with no evidence of tamponade. EKG does not show signs of pericarditis or ischemia.  Vitals are stable at this time.     #Pleuritic Chest pain likely pericarditis  #Large pericardial effusion  Most likely in the setting of pericarditis as the patient recently had a fever and is having typical symptoms of pericarditis.  -TTE on 4/5 showed moderate sized pericardial effusion with no tamponade physiology.  -rTTE on 4/6 showed diminishing pericardial effusion with no tamponade physiology.  -Patient is currently hemodynamically stable and symptom has much improved.  -continue treatment of patient with aspirin 650mg q8h and colchicine 0.6mg BID. Consider stopping high-dose ASA and monitor how her symptoms are suppressed solely on colchicine and low dose ASA as patient will need AC given her AFib w/ elevated [id number] score of 4.  -Pantoprazole 40mg PO daily    # AFib  - on metop tartrate 50mg BID  - currently rate controlled on tele  - on hep gtt. Consider transition to NOAC if there is no further procedures planned.    #CAD  -Aspirin as above  -Can stop plavix as stent is from 2019  -Continue statin    This patient was seen and examined personally by me and the plan was discussed with the fellow and/or resident above. Amendments were made as necessary to the above. Agree with the excellent note and plan above. NOAC transition.    Epifanio Mahoney MD, MPhil, [facility]  Cardiologist, [facility]  ; [facility] and [facility]  Email: [email or web address] and Cardiovascular Surgery on-service contact/call information, go to [email or web address] and use "NLT [employer]INE" to login.  Outpatient Cardiology appointments, call [phone] to arrange with a colleague; I do not have outpatient Cardiology clinic.

## 2023-04-07 NOTE — PROVIDER CONTACT NOTE (OTHER) - RECOMMENDATIONS
Make MD aware, EKG done confirming afib RVR
Administered scheduled Metoprolol 50mg
Notify the provider that RN scanned the Heparin bag after receiving the aPPT result. The Full Anticoagulation Nomogram instructed the RN to increase the rate to 14 ml/hr.
Patient placed on 2L NC, responded well. MD made aware, called for bedside assessment

## 2023-04-07 NOTE — PROGRESS NOTE ADULT - ATTENDING COMMENTS
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    - episode of chest pain and SOB overnight 4/6; bedside ECHO done by cardio, no signs of tamponade; currently improving this morning   -Appreciate cardiology recommendations. POCUS more suggestive for moderate effusion w/o signs of tamponade physiology. Pt developed new onset afib w/ rvr during admission; repeat ECHO done showing decrease in size of effusion   - continue with heparin gtt for now; likely transition to DOAC over weekend, if remains stable and no plans for procedures   - cardio recs noted; dc high dose asa; continue with colchicine for pericarditis for now and monitor response; will start ASA 81mg 4/8 in setting of CAD history   -Possible viral cause of pericarditis and pericardial effusion? Pt's chest pain seems pleuritic in nature  -Trend PTT, vital signs frequently  -Telemetry  -originally holding antihypertensives; will slowly reintroduce home meds; resume hydralazine for now; resume rest of BP meds over weekend as tolerable   -PPI prophylaxis  - anemia noted; lower than prior baseline; no signs of bleeding noted; stable; continue iron supplementation   - no longer need plavix since stent is from 2019  - A1c 8.5   -F/u cardiology recommendations   - PT eval pending     DC planning in 2-3 days pending clinical improvement and cardio recs     Rest as above. Discussed with HS.

## 2023-04-07 NOTE — PROGRESS NOTE ADULT - ASSESSMENT
72F hx DM, HTN, CAD s/p stent (2019), admitted with worsening pleuritic CP x3 days, fevers x1 week, found to have large pericardial effusion on CT chest, no evidence tamponade on limited echo. Started on ASA/Colchicine for suspected pericarditis. Course c/b new onset AFib RVR, started on heparin gtt

## 2023-04-07 NOTE — PROGRESS NOTE ADULT - PROBLEM SELECTOR PLAN 4
Baseline 12-13, now hemoglobin 8.6, may be component of chronic disease with MARQUEZ.   - Ferritin 10, sat 6% on recent outpatient labs.   - start ferrous sulfate  - Hgb 9.8 this morning

## 2023-04-08 ENCOUNTER — TRANSCRIPTION ENCOUNTER (OUTPATIENT)
Age: 73
End: 2023-04-08

## 2023-04-08 LAB
ANION GAP SERPL CALC-SCNC: 11 MMOL/L — SIGNIFICANT CHANGE UP (ref 5–17)
APTT BLD: 78 SEC — HIGH (ref 27.5–35.5)
APTT BLD: 79.7 SEC — HIGH (ref 27.5–35.5)
BUN SERPL-MCNC: 13 MG/DL — SIGNIFICANT CHANGE UP (ref 7–23)
CALCIUM SERPL-MCNC: 8.1 MG/DL — LOW (ref 8.4–10.5)
CHLORIDE SERPL-SCNC: 100 MMOL/L — SIGNIFICANT CHANGE UP (ref 96–108)
CO2 SERPL-SCNC: 22 MMOL/L — SIGNIFICANT CHANGE UP (ref 22–31)
CREAT SERPL-MCNC: 0.97 MG/DL — SIGNIFICANT CHANGE UP (ref 0.5–1.3)
EGFR: 62 ML/MIN/1.73M2 — SIGNIFICANT CHANGE UP
GLUCOSE BLDC GLUCOMTR-MCNC: 173 MG/DL — HIGH (ref 70–99)
GLUCOSE BLDC GLUCOMTR-MCNC: 229 MG/DL — HIGH (ref 70–99)
GLUCOSE BLDC GLUCOMTR-MCNC: 262 MG/DL — HIGH (ref 70–99)
GLUCOSE BLDC GLUCOMTR-MCNC: 292 MG/DL — HIGH (ref 70–99)
GLUCOSE SERPL-MCNC: 338 MG/DL — HIGH (ref 70–99)
HCT VFR BLD CALC: 30.7 % — LOW (ref 34.5–45)
HGB BLD-MCNC: 9.2 G/DL — LOW (ref 11.5–15.5)
MAGNESIUM SERPL-MCNC: 1.8 MG/DL — SIGNIFICANT CHANGE UP (ref 1.6–2.6)
MCHC RBC-ENTMCNC: 24 PG — LOW (ref 27–34)
MCHC RBC-ENTMCNC: 30 GM/DL — LOW (ref 32–36)
MCV RBC AUTO: 79.9 FL — LOW (ref 80–100)
NRBC # BLD: 0 /100 WBCS — SIGNIFICANT CHANGE UP (ref 0–0)
PHOSPHATE SERPL-MCNC: 1.7 MG/DL — LOW (ref 2.5–4.5)
PLATELET # BLD AUTO: 481 K/UL — HIGH (ref 150–400)
POTASSIUM SERPL-MCNC: 4.3 MMOL/L — SIGNIFICANT CHANGE UP (ref 3.5–5.3)
POTASSIUM SERPL-SCNC: 4.3 MMOL/L — SIGNIFICANT CHANGE UP (ref 3.5–5.3)
RBC # BLD: 3.84 M/UL — SIGNIFICANT CHANGE UP (ref 3.8–5.2)
RBC # FLD: 17.2 % — HIGH (ref 10.3–14.5)
SODIUM SERPL-SCNC: 133 MMOL/L — LOW (ref 135–145)
WBC # BLD: 10.52 K/UL — HIGH (ref 3.8–10.5)
WBC # FLD AUTO: 10.52 K/UL — HIGH (ref 3.8–10.5)

## 2023-04-08 PROCEDURE — 99233 SBSQ HOSP IP/OBS HIGH 50: CPT | Mod: GC

## 2023-04-08 PROCEDURE — 71045 X-RAY EXAM CHEST 1 VIEW: CPT | Mod: 26

## 2023-04-08 RX ORDER — INSULIN LISPRO 100/ML
6 VIAL (ML) SUBCUTANEOUS
Refills: 0 | Status: DISCONTINUED | OUTPATIENT
Start: 2023-04-08 | End: 2023-04-10

## 2023-04-08 RX ORDER — APIXABAN 2.5 MG/1
5 TABLET, FILM COATED ORAL EVERY 12 HOURS
Refills: 0 | Status: DISCONTINUED | OUTPATIENT
Start: 2023-04-08 | End: 2023-04-11

## 2023-04-08 RX ORDER — SODIUM,POTASSIUM PHOSPHATES 278-250MG
1 POWDER IN PACKET (EA) ORAL
Refills: 0 | Status: COMPLETED | OUTPATIENT
Start: 2023-04-08 | End: 2023-04-08

## 2023-04-08 RX ORDER — IPRATROPIUM/ALBUTEROL SULFATE 18-103MCG
3 AEROSOL WITH ADAPTER (GRAM) INHALATION ONCE
Refills: 0 | Status: COMPLETED | OUTPATIENT
Start: 2023-04-08 | End: 2023-04-08

## 2023-04-08 RX ADMIN — PANTOPRAZOLE SODIUM 40 MILLIGRAM(S): 20 TABLET, DELAYED RELEASE ORAL at 05:20

## 2023-04-08 RX ADMIN — Medication 3 UNIT(S): at 08:27

## 2023-04-08 RX ADMIN — Medication 1 PACKET(S): at 08:45

## 2023-04-08 RX ADMIN — Medication 0.6 MILLIGRAM(S): at 17:00

## 2023-04-08 RX ADMIN — GABAPENTIN 300 MILLIGRAM(S): 400 CAPSULE ORAL at 05:20

## 2023-04-08 RX ADMIN — Medication 650 MILLIGRAM(S): at 10:51

## 2023-04-08 RX ADMIN — Medication 650 MILLIGRAM(S): at 12:40

## 2023-04-08 RX ADMIN — Medication 6: at 08:28

## 2023-04-08 RX ADMIN — Medication 1 PACKET(S): at 12:15

## 2023-04-08 RX ADMIN — APIXABAN 5 MILLIGRAM(S): 2.5 TABLET, FILM COATED ORAL at 17:00

## 2023-04-08 RX ADMIN — Medication 81 MILLIGRAM(S): at 12:15

## 2023-04-08 RX ADMIN — Medication 3 MILLILITER(S): at 13:27

## 2023-04-08 RX ADMIN — GABAPENTIN 300 MILLIGRAM(S): 400 CAPSULE ORAL at 13:26

## 2023-04-08 RX ADMIN — Medication 6: at 12:14

## 2023-04-08 RX ADMIN — HEPARIN SODIUM 1400 UNIT(S)/HR: 5000 INJECTION INTRAVENOUS; SUBCUTANEOUS at 05:58

## 2023-04-08 RX ADMIN — Medication 0.1 MILLIGRAM(S): at 08:45

## 2023-04-08 RX ADMIN — Medication 325 MILLIGRAM(S): at 07:08

## 2023-04-08 RX ADMIN — Medication 6 UNIT(S): at 12:15

## 2023-04-08 RX ADMIN — Medication 50 MILLIGRAM(S): at 05:19

## 2023-04-08 RX ADMIN — GABAPENTIN 300 MILLIGRAM(S): 400 CAPSULE ORAL at 22:45

## 2023-04-08 RX ADMIN — Medication 0.6 MILLIGRAM(S): at 05:19

## 2023-04-08 RX ADMIN — SIMVASTATIN 40 MILLIGRAM(S): 20 TABLET, FILM COATED ORAL at 22:45

## 2023-04-08 RX ADMIN — Medication 6 UNIT(S): at 17:00

## 2023-04-08 RX ADMIN — Medication 50 MILLIGRAM(S): at 17:00

## 2023-04-08 RX ADMIN — Medication 0.1 MILLIGRAM(S): at 17:00

## 2023-04-08 RX ADMIN — Medication 2: at 17:00

## 2023-04-08 RX ADMIN — Medication 50 MILLIGRAM(S): at 05:20

## 2023-04-08 NOTE — DISCHARGE NOTE PROVIDER - NSDCMRMEDTOKEN_GEN_ALL_CORE_FT
aspirin 81 mg oral delayed release tablet: 1 tab(s) orally once a day  cloNIDine 0.1 mg oral tablet: 1 tab(s) orally 2 times a day  clopidogrel 75 mg oral tablet: 1 tab(s) orally once a day  enalapril 20 mg oral tablet: 1 tab(s) orally 2 times a day  gabapentin 300 mg oral capsule: 1 cap(s) orally 3 times a day  hydrALAZINE 50 mg oral tablet: 1 tab(s) orally 2 times a day  metFORMIN 500 mg oral tablet: 1 tab(s) orally 3 times a day  Hold 48 hrs, resume 9/9/22  metoprolol tartrate 50 mg oral tablet: 1 tab(s) orally 2 times a day  pantoprazole 40 mg oral delayed release tablet: 1 tab(s) orally once a day  simvastatin 40 mg oral tablet: 1 tab(s) orally once a day (at bedtime)   apixaban 5 mg oral tablet: 1 tab(s) orally every 12 hours  aspirin 81 mg oral delayed release tablet: 1 tab(s) orally once a day  atorvastatin 20 mg oral tablet: 1 tab(s) orally once a day (at bedtime)  cloNIDine 0.1 mg oral tablet: 1 tab(s) orally 2 times a day  colchicine 0.6 mg oral tablet: 1 tab(s) orally every 12 hours  enalapril 20 mg oral tablet: 1 tab(s) orally 2 times a day  gabapentin 300 mg oral capsule: 1 cap(s) orally 3 times a day  hydrALAZINE 50 mg oral tablet: 1 tab(s) orally 3 times a day  metFORMIN 500 mg oral tablet: 1 tab(s) orally 3 times a day  Hold 48 hrs, resume 9/9/22  metoprolol tartrate 50 mg oral tablet: 1 tab(s) orally 2 times a day  pantoprazole 40 mg oral delayed release tablet: 1 tab(s) orally once a day   apixaban 5 mg oral tablet: 1 tab(s) orally every 12 hours  aspirin 81 mg oral delayed release tablet: 1 tab(s) orally once a day  atorvastatin 20 mg oral tablet: 1 tab(s) orally once a day (at bedtime)  cloNIDine 0.1 mg oral tablet: 1 tab(s) orally 2 times a day  colchicine 0.6 mg oral tablet: 1 tab(s) orally every 12 hours  enalapril 20 mg oral tablet: 1 tab(s) orally 2 times a day  gabapentin 300 mg oral capsule: 1 cap(s) orally 3 times a day  hydrALAZINE 50 mg oral tablet: 1 tab(s) orally 3 times a day  metFORMIN 500 mg oral tablet: 2 tab(s) orally 2 times a day  metoprolol tartrate 50 mg oral tablet: 1 tab(s) orally 2 times a day  pantoprazole 40 mg oral delayed release tablet: 1 tab(s) orally once a day

## 2023-04-08 NOTE — PROGRESS NOTE ADULT - PROBLEM SELECTOR PLAN 1
Recent viral illness last week? Now with worsening chest pain, concern for pericarditis after recent URI.   - limited TTE on admission shows moderate pericardial effusion with no tamponade physiology  - CRP elevated (286), proBNP elevated (2285). Trops wnl  - s/p  q8h, 4/5-4/6  - c/w colchicine 0.6 q12h  - repeat full TTE 4/6 shows small pericardial effusion, no hemodynamic compromise  - pain control: Tylenol prn, oxy for severe pain  - cards recs appreciated; no need for urgent drainage per cards given hemodynamic stability Recent viral illness last week? Now with worsening chest pain, concern for pericarditis after recent URI.   - limited TTE on admission shows moderate pericardial effusion with no tamponade physiology  - CRP elevated (286), proBNP elevated (2285). Trops wnl  - s/p  q8h, 4/5-4/6  - c/w colchicine 0.6 q12h  - repeat full TTE 4/6 shows small pericardial effusion, no hemodynamic compromise  - pain control: Tylenol prn, oxy prn for severe pain, topical diclofenac  - cards recs appreciated; no need for urgent drainage per cards given hemodynamic stability

## 2023-04-08 NOTE — PROGRESS NOTE ADULT - SUBJECTIVE AND OBJECTIVE BOX
DATE OF SERVICE: 04-08-23 @ 07:19    Patient is a 72y old  Female who presents with a chief complaint of Chest pain (07 Apr 2023 08:45)      SUBJECTIVE / OVERNIGHT EVENTS:  No acute events overnight  Afebrile, blood pressures stably elevated to max of 170s systolic. Tele ***  ***    MEDICATIONS  (STANDING):  aspirin  chewable 81 milliGRAM(s) Oral daily  cloNIDine 0.1 milliGRAM(s) Oral two times a day  colchicine 0.6 milliGRAM(s) Oral every 12 hours  dextrose 5%. 1000 milliLiter(s) (50 mL/Hr) IV Continuous <Continuous>  dextrose 5%. 1000 milliLiter(s) (100 mL/Hr) IV Continuous <Continuous>  dextrose 50% Injectable 25 Gram(s) IV Push once  dextrose 50% Injectable 12.5 Gram(s) IV Push once  dextrose 50% Injectable 25 Gram(s) IV Push once  ferrous    sulfate 325 milliGRAM(s) Oral <User Schedule>  gabapentin 300 milliGRAM(s) Oral three times a day  glucagon  Injectable 1 milliGRAM(s) IntraMuscular once  heparin  Infusion. 1400 Unit(s)/Hr (14 mL/Hr) IV Continuous <Continuous>  hydrALAZINE 50 milliGRAM(s) Oral two times a day  insulin lispro (ADMELOG) corrective regimen sliding scale   SubCutaneous three times a day before meals  insulin lispro (ADMELOG) corrective regimen sliding scale   SubCutaneous at bedtime  insulin lispro Injectable (ADMELOG) 6 Unit(s) SubCutaneous three times a day before meals  metoprolol tartrate 50 milliGRAM(s) Oral every 12 hours  pantoprazole    Tablet 40 milliGRAM(s) Oral before breakfast  potassium phosphate / sodium phosphate Powder (PHOS-NaK) 1 Packet(s) Oral every 2 hours  simvastatin 40 milliGRAM(s) Oral at bedtime  sodium chloride 0.9%. 1000 milliLiter(s) (75 mL/Hr) IV Continuous <Continuous>    MEDICATIONS  (PRN):  acetaminophen     Tablet .. 650 milliGRAM(s) Oral every 6 hours PRN Mild Pain (1 - 3)  dextrose Oral Gel 15 Gram(s) Oral once PRN Blood Glucose LESS THAN 70 milliGRAM(s)/deciliter  diclofenac sodium 1% Gel 2 Gram(s) Topical two times a day PRN shoulder pain  heparin   Injectable 5000 Unit(s) IV Push every 6 hours PRN For aPTT less than 40  heparin   Injectable 2500 Unit(s) IV Push every 6 hours PRN For aPTT between 40 - 57  oxyCODONE    IR 5 milliGRAM(s) Oral every 6 hours PRN Moderate Pain (4 - 6)  oxyCODONE    IR 10 milliGRAM(s) Oral every 6 hours PRN Severe Pain (7 - 10)      Vital Signs Last 24 Hrs  T(C): 36.6 (08 Apr 2023 05:18), Max: 36.9 (07 Apr 2023 17:40)  T(F): 97.9 (08 Apr 2023 05:18), Max: 98.4 (07 Apr 2023 17:40)  HR: 98 (08 Apr 2023 05:18) (81 - 140)  BP: 170/90 (08 Apr 2023 05:18) (118/70 - 178/92)  BP(mean): --  RR: 18 (08 Apr 2023 05:18) (18 - 18)  SpO2: 98% (08 Apr 2023 05:18) (88% - 98%)    Parameters below as of 07 Apr 2023 23:00  Patient On (Oxygen Delivery Method): room air      CAPILLARY BLOOD GLUCOSE      POCT Blood Glucose.: 315 mg/dL (07 Apr 2023 21:09)  POCT Blood Glucose.: 305 mg/dL (07 Apr 2023 21:07)  POCT Blood Glucose.: 255 mg/dL (07 Apr 2023 17:07)  POCT Blood Glucose.: 262 mg/dL (07 Apr 2023 11:55)  POCT Blood Glucose.: 171 mg/dL (07 Apr 2023 08:10)    I&O's Summary    07 Apr 2023 07:01  -  08 Apr 2023 07:00  --------------------------------------------------------  IN: 480 mL / OUT: 0 mL / NET: 480 mL        PHYSICAL EXAM:  GENERAL: No apparent distress  HEAD:  Atraumatic, normocephalic  EYES: EOMI, PERRLA, conjunctiva and sclera clear  NECK: Supple, no JVD  CHEST/LUNG: Clear to auscultation bilaterally; No wheeze, rales, rhonchi. Non-labored appearance on room air  HEART: Irregular rate & rhythm. No murmurs or rubs  ABDOMEN: Soft, nontender, nondistended; Bowel sounds present  EXTREMITIES:  No LE edema  PSYCH: AAOx3  NEUROLOGY: No focal deficits  SKIN: No rashes or lesions      LABS:                        9.2    10.52 )-----------( 481      ( 08 Apr 2023 05:32 )             30.7     04-08    133<L>  |  100  |  13  ----------------------------<  338<H>  4.3   |  22  |  0.97    Ca    8.1<L>      08 Apr 2023 05:32  Phos  1.7     04-08  Mg     1.8     04-08      PTT - ( 08 Apr 2023 05:32 )  PTT:78.0 sec          RADIOLOGY & ADDITIONAL TESTS:  Reviewed     DATE OF SERVICE: 04-08-23 @ 07:19    Patient is a 72y old  Female who presents with a chief complaint of Chest pain (07 Apr 2023 08:45)      SUBJECTIVE / OVERNIGHT EVENTS:  No acute events overnight  Afebrile, blood pressures stably elevated to max of 170s systolic. Tele: converted to NSR overnight; sinus 80s-90s since 3 am  Reports pleuritic chest pain & b/l pain across her chest. She is unable to tell me if it is worse than yesterday, but she appears more uncomfortable today    MEDICATIONS  (STANDING):  aspirin  chewable 81 milliGRAM(s) Oral daily  cloNIDine 0.1 milliGRAM(s) Oral two times a day  colchicine 0.6 milliGRAM(s) Oral every 12 hours  dextrose 5%. 1000 milliLiter(s) (50 mL/Hr) IV Continuous <Continuous>  dextrose 5%. 1000 milliLiter(s) (100 mL/Hr) IV Continuous <Continuous>  dextrose 50% Injectable 25 Gram(s) IV Push once  dextrose 50% Injectable 12.5 Gram(s) IV Push once  dextrose 50% Injectable 25 Gram(s) IV Push once  ferrous    sulfate 325 milliGRAM(s) Oral <User Schedule>  gabapentin 300 milliGRAM(s) Oral three times a day  glucagon  Injectable 1 milliGRAM(s) IntraMuscular once  heparin  Infusion. 1400 Unit(s)/Hr (14 mL/Hr) IV Continuous <Continuous>  hydrALAZINE 50 milliGRAM(s) Oral two times a day  insulin lispro (ADMELOG) corrective regimen sliding scale   SubCutaneous three times a day before meals  insulin lispro (ADMELOG) corrective regimen sliding scale   SubCutaneous at bedtime  insulin lispro Injectable (ADMELOG) 6 Unit(s) SubCutaneous three times a day before meals  metoprolol tartrate 50 milliGRAM(s) Oral every 12 hours  pantoprazole    Tablet 40 milliGRAM(s) Oral before breakfast  potassium phosphate / sodium phosphate Powder (PHOS-NaK) 1 Packet(s) Oral every 2 hours  simvastatin 40 milliGRAM(s) Oral at bedtime  sodium chloride 0.9%. 1000 milliLiter(s) (75 mL/Hr) IV Continuous <Continuous>    MEDICATIONS  (PRN):  acetaminophen     Tablet .. 650 milliGRAM(s) Oral every 6 hours PRN Mild Pain (1 - 3)  dextrose Oral Gel 15 Gram(s) Oral once PRN Blood Glucose LESS THAN 70 milliGRAM(s)/deciliter  diclofenac sodium 1% Gel 2 Gram(s) Topical two times a day PRN shoulder pain  heparin   Injectable 5000 Unit(s) IV Push every 6 hours PRN For aPTT less than 40  heparin   Injectable 2500 Unit(s) IV Push every 6 hours PRN For aPTT between 40 - 57  oxyCODONE    IR 5 milliGRAM(s) Oral every 6 hours PRN Moderate Pain (4 - 6)  oxyCODONE    IR 10 milliGRAM(s) Oral every 6 hours PRN Severe Pain (7 - 10)      Vital Signs Last 24 Hrs  T(C): 36.6 (08 Apr 2023 05:18), Max: 36.9 (07 Apr 2023 17:40)  T(F): 97.9 (08 Apr 2023 05:18), Max: 98.4 (07 Apr 2023 17:40)  HR: 98 (08 Apr 2023 05:18) (81 - 140)  BP: 170/90 (08 Apr 2023 05:18) (118/70 - 178/92)  BP(mean): --  RR: 18 (08 Apr 2023 05:18) (18 - 18)  SpO2: 98% (08 Apr 2023 05:18) (88% - 98%)    Parameters below as of 07 Apr 2023 23:00  Patient On (Oxygen Delivery Method): room air      CAPILLARY BLOOD GLUCOSE      POCT Blood Glucose.: 315 mg/dL (07 Apr 2023 21:09)  POCT Blood Glucose.: 305 mg/dL (07 Apr 2023 21:07)  POCT Blood Glucose.: 255 mg/dL (07 Apr 2023 17:07)  POCT Blood Glucose.: 262 mg/dL (07 Apr 2023 11:55)  POCT Blood Glucose.: 171 mg/dL (07 Apr 2023 08:10)    I&O's Summary    07 Apr 2023 07:01  -  08 Apr 2023 07:00  --------------------------------------------------------  IN: 480 mL / OUT: 0 mL / NET: 480 mL        PHYSICAL EXAM:  GENERAL: Uncomfortable appearing  HEAD:  Atraumatic, normocephalic  EYES: EOMI, PERRLA, conjunctiva and sclera clear  NECK: Supple, no JVD  CHEST/LUNG: Mild intermittent wheeze. No rales or rhonchi. Appears mildly labored on room air with shallow breaths, belly breathing  HEART: Regular rate & rhythm. No murmurs or rubs  ABDOMEN: Soft, nontender, nondistended; Bowel sounds present  EXTREMITIES:  No LE edema  PSYCH: AAOx3  NEUROLOGY: No focal deficits  SKIN: No rashes or lesions      LABS:                        9.2    10.52 )-----------( 481      ( 08 Apr 2023 05:32 )             30.7     04-08    133<L>  |  100  |  13  ----------------------------<  338<H>  4.3   |  22  |  0.97    Ca    8.1<L>      08 Apr 2023 05:32  Phos  1.7     04-08  Mg     1.8     04-08      PTT - ( 08 Apr 2023 05:32 )  PTT:78.0 sec          RADIOLOGY & ADDITIONAL TESTS:  Reviewed

## 2023-04-08 NOTE — DISCHARGE NOTE PROVIDER - CARE PROVIDER_API CALL
Carol Kitchen)  Internal Medicine  [address], [address]  [place] [address]  Phone: [phone]  Fax: [phone]  Established Patient  Follow Up Time: 1 week    Nick Pearce)  Cardiovascular Disease; Interventional Cardiology  [address] [place] [address]  Phone: [phone]  Fax: [phone]  Established Patient  Follow Up Time: 1 week   Carol Kitchen)  Internal Medicine  [address], [address]  [place] [address]  Phone: [phone]  Fax: [phone]  Established Patient  Scheduled Appointment: 04/13/2023 02:20 AM    Nick Pearce)  Cardiovascular Disease; Interventional Cardiology  [address] [place] [address]  Phone: [phone]  Fax: [phone]  Established Patient  Follow Up Time: 1 week

## 2023-04-08 NOTE — PROGRESS NOTE ADULT - PROBLEM SELECTOR PLAN 7
Pt on enalapril 20 bid, clonidine 0.1 bid, hydral 50 bid at home  - restarted hydral 50 bid yesterday  - restart clonidine 0.1 bid today  - restart other home antihypertensives gradually as tolerated Pt on enalapril 20 bid, clonidine 0.1 bid, hydral 50 bid at home  - BPs have been stably elevated (max systolics to 180s)  - restarted hydral 50 bid yesterday  - restart clonidine 0.1 bid today  - restart other home antihypertensives gradually as tolerated

## 2023-04-08 NOTE — PROGRESS NOTE ADULT - PROBLEM SELECTOR PLAN 5
- resume ASA 81 today  - pt was reportedly on Plavix at home. Last stent in 2019, should discontinue

## 2023-04-08 NOTE — DISCHARGE NOTE PROVIDER - CARE PROVIDERS DIRECT ADDRESSES
[email or web address]

## 2023-04-08 NOTE — PROGRESS NOTE ADULT - PROBLEM SELECTOR PLAN 2
New onset this admission. DARRELL-Vasc 5. Rates up to 140 given IV lopressor 5 x2 rates now 120s.   - TSH wnl  - hemodynamically stable  - c/w metop 50 bid (home dose)  - c/w heparin gtt, plan to discontinue today  - start eliquis today  - improved rate control on telemetry

## 2023-04-08 NOTE — DISCHARGE NOTE PROVIDER - PROVIDER TOKENS
PROVIDER:[TOKEN:[[id number]],FOLLOWUP:[1 week],ESTABLISHEDPATIENT:[T]],PROVIDER:[[id number]],FOLLOWUP:[1 week],ESTABLISHEDPATIENT:[T]] PROVIDER:[TOKEN:[[id number]],SCHEDULEDAPPT:[04/13/2023],SCHEDULEDAPPTTIME:[02:20 AM],ESTABLISHEDPATIENT:[T]],PROVIDER:[TOKEN:[[id number]],FOLLOWUP:[1 week],ESTABLISHEDPATIENT:[T]]

## 2023-04-08 NOTE — PROGRESS NOTE ADULT - PROBLEM SELECTOR PLAN 6
Pt on metformin at home. A1c 8.5  - Continue statin  - moderate SSI mealtime and bedtime  - start 6u Admelog premeal as FSGs have been in 300s (required 18u total yesterday)

## 2023-04-08 NOTE — PROGRESS NOTE ADULT - PROBLEM SELECTOR PLAN 3
SCr baseline 0.3-0.5. SCr on admission 1.10. Likely prerenal in setting of poor PO intake  - FeNa 0.8% c/w prerenal etiology  - SCr 0.97 today - downtrending  - ctm while on NSAIDs. Pt also did receive contrast for CTA

## 2023-04-08 NOTE — PROGRESS NOTE ADULT - ATTENDING COMMENTS
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    -Appreciate cardiology recommendations. POCUS more suggestive for moderate effusion w/o signs of tamponade physiology. Pt developed new onset afib w/ rvr during admission; repeat ECHO done showing decrease in size of effusion   - continue with heparin gtt for now; likely transition to DOAC if remains stable and no plans for procedures   - cardio recs noted; dc high dose asa; continue with colchicine for pericarditis for now and monitor response; was started on ASA 81mg 4/8 in setting of CAD history   -Possible viral cause of pericarditis and pericardial effusion? Pt's chest pain seems pleuritic in nature  -Trend PTT, vital signs frequently  -Telemetry --> pt converted to NSR on 4/8 as per Tele   -originally holding antihypertensives; will slowly reintroduce home meds; resume hydralazine for now; resume CLonidine  and monitor BP   -PPI prophylaxis  - anemia --> no signs of bleeding noted; stable; continue iron supplementation   - no longer need plavix since stent is from 2019  - A1c 8.5   -F/u cardiology recommendations   - PT eval pending   MOnitor SPO2       Olga Ward   HOspitalist   [phone]   Available on TEAMS

## 2023-04-08 NOTE — DISCHARGE NOTE PROVIDER - NSDCCPTREATMENT_GEN_ALL_CORE_FT
PRINCIPAL PROCEDURE  Procedure: Transthoracic echocardiography (TTE)  Findings and Treatment: 4/6/23  CONCLUSIONS:      1. Small pericardial effusion with no evidence of hemodynamic compromise: no diastolic collapse of right atrium, exceding 1/3 of the cardiac cycle and no early diastolic inversion of the right ventricle.   2. Compared to the transthoracic echocardiogram performed on 4/5/2023 The pericardial effusion has decreased in size.  _____________________________________________________________________________  FINDINGS:  Left Ventricle:  The left ventricular systolic function is normal with an ejection fraction visually estimated at 70 to 75%.     Pericardium:  There is Small pericardial effusion measuring 0.80 cm with no evidence of hemodynamic compromise. This was supported by evidence of, the right atrium does not display diastolic collapse, exceding 1/3 of the cardiac cycle and no early diastolic inversion of the right ventricle.      SECONDARY PROCEDURE  Procedure: XR chest AP  Findings and Treatment: 4/8/23  FINDINGS:  Enlarged cardiac silhouette. Hazy opacity left lung base may represent   pleural effusion/atelectasis. The lungs are otherwise clear. No   pneumothorax.  IMPRESSION:  Enlarged cardiacsilhouette compatible with known pericardial effusion.    Procedure: Transthoracic echocardiography (TTE)  Findings and Treatment: 4/5/23  CONCLUSIONS:      1. Small leftventricular cavity size. The left ventricular wall thickness is normal. The left ventricular systolic function is normal. There are no regional wall motion abnormalities seen.   2. There is normal left ventricular diastolic function.   3. Normal right ventricular cavity size, normal wall thickness and normal systolic function.   4. Moderate pericardial effusion with no evidence of hemodynamic compromise: respiratory variation across the mitral valve E wave is not greater than 30%, greater than 60% respiratory variation across the tricuspid valve E wave, no early diastolic inversion of the right ventricle, no diastolic collapse of right atrium, exceding 1/3 of the cardiac cycle and IVC and hepatic vein were not imaged on this study.   5. No prior echocardiogram is available for comparison.    Procedure: CT angiogram chest w contrast  Findings and Treatment: 4/5/23  FINDINGS:  PULMONARY VESSELS: No pulmonary embolus. Main pulmonary artery normal in   diameter.  HEART AND VASCULATURE: Heart size is normal. Large pericardial effusion.   No aortic aneurysm or dissection.  LUNGS, AIRWAYS, PLEURA: Patent central airways. Small left pleural   effusion. Subsegmental atelectasis of left lower lobe and lingula.  MEDIASTINUM: No mass or lymphadenopathy.  UPPER ABDOMEN: Within normal limits.  BONES AND SOFT TISSUES: No aggressive osseous lesion.  LOWER NECK: Within normal limits.  IMPRESSION:  No pulmonary embolus.  Large pericardial effusion.  Small left pleural effusion and mild atelectasis at the left base.

## 2023-04-08 NOTE — DISCHARGE NOTE PROVIDER - HOSPITAL COURSE
DISCHARGE SUMMARY    Ms. Us is a 72 year old female with PMH of DM, HTN, CAD s/p stent (2019), admitted with worsening pleuritic CP x3 days, recent syncopal episode, & fevers x1 week. On admission, CTA chest showed a large pericardial effusion. Pt remained hemodynamically stable during admission and was started on high-dose ASA & colchicine for suspected pericarditis. Subsequent TTEs demonstrated reduction in size of pericardial effusion with no tamponade physiology; last TTE done 4/6 demonstrated small pericardial effusion. Prior to discharge, pt was safely resumed on her home blood pressure medications, and her aspirin was reduced back to her home dose.     Course c/b new-onset AFib with RVR. Pt was restarted on home dose of metoprolol and adequately rate controlled. Converted back to NSR during admission. Due to elevated CHADsVASC, pt was started on Eliquis.    ACTION ITEMS  1. Disposition: Home w/ outpatient PT  2. Follow-ups: PCP, cardiology  3. Medication changes:  	START: Eliquis, colchicine   DISCHARGE SUMMARY    Ms. Us is a 72 year old female with PMH of DM, HTN, CAD s/p stent (2019), admitted with worsening pleuritic CP x3 days, recent syncopal episode, & fevers x1 week. On admission, CTA chest showed a large pericardial effusion. Pt remained hemodynamically stable during admission and was started on high-dose ASA & colchicine for suspected pericarditis. Subsequent TTEs demonstrated reduction in size of pericardial effusion with no tamponade physiology; last TTE done 4/6 demonstrated small pericardial effusion. Prior to discharge, pt was safely resumed on her home blood pressure medications, and her aspirin was reduced back to her home dose.     Course c/b new-onset AFib with RVR. Pt was restarted on home dose of metoprolol and adequately rate controlled. Converted back to NSR during admission. Due to elevated CHADsVASC, pt was started on Eliquis. In addition, pt was noted to have anemia with evidence of iron deficiency on recent outpatient labs, and was started on iron supplementation.    ACTION ITEMS  1. Disposition: Home w/ outpatient PT  2. Follow-ups: PCP (Dr. Kitchen), cardiology (Dr. Pearce)  3. Medication changes:  	START: Eliquis, colchicine, ferrous sulfate  	STOP: Plavix   Ms. Us is a 72 year old female with PMH of DM, HTN, CAD s/p stent (2019), admitted with worsening pleuritic CP x3 days, recent syncopal episode, & fevers x1 week. On admission, CTA chest showed a large pericardial effusion. Pt remained hemodynamically stable during admission and was started on high-dose ASA & colchicine for suspected pericarditis. Subsequent TTEs demonstrated reduction in size of pericardial effusion with no tamponade physiology; last TTE done 4/6 demonstrated small pericardial effusion. Prior to discharge, pt was safely resumed on her home blood pressure medications, and her aspirin was reduced back to her home dose.     Course c/b new-onset AFib with RVR. Pt was restarted on home dose of metoprolol and adequately rate controlled. Converted back to NSR during admission. Due to elevated CHADsVASC, pt was started on Eliquis. In addition, pt was noted to have anemia with evidence of iron deficiency on recent outpatient labs, and was started on iron supplementation.    Today, the patient is afebrile and hemodynamically stable for discharge home. She will follow up with her primary care provider and cardiology when she leaves the hospital.   Ms. Us is a 72 year old female with PMH of DM, HTN, CAD s/p stent (2019), admitted with worsening pleuritic CP x3 days, recent syncopal episode, & fevers x1 week. On admission, CTA chest showed a large pericardial effusion. Pt remained hemodynamically stable during admission and was started on high-dose ASA & colchicine for suspected pericarditis. Subsequent TTEs demonstrated reduction in size of pericardial effusion with no tamponade physiology; last TTE done 4/10 demonstrated small pericardial effusion. Prior to discharge, pt was safely resumed on her home blood pressure medications, and her aspirin. was reduced back to her home dose.     Course c/b new-onset AFib with RVR. Pt was restarted on home dose of metoprolol and adequately rate controlled. Converted back to NSR during admission. Due to elevated CHADsVASC, pt was started on Eliquis. In addition, pt was noted to have anemia with evidence of iron deficiency on recent outpatient labs, and was started on iron supplementation.    Today, the patient is afebrile and hemodynamically stable for discharge home. She will follow up with her primary care provider and cardiology when she leaves the hospital.

## 2023-04-08 NOTE — PROGRESS NOTE ADULT - PROBLEM SELECTOR PLAN 4
Baseline 12-13, now hemoglobin 8.6, may be component of chronic disease with MARQUEZ.   - Ferritin 10, sat 6% on recent outpatient labs.   - started ferrous sulfate  - Hgb stable

## 2023-04-08 NOTE — PROGRESS NOTE ADULT - PROBLEM SELECTOR PLAN 8
DVT ppx: heparin gtt  Diet: CC/DASH vegan  Code status: full code  Dispo: outpatient PT DVT ppx: heparin gtt -> Eliquis  Diet: CC/DASH vegan  Code status: full code  Dispo: outpatient PT

## 2023-04-08 NOTE — DISCHARGE NOTE PROVIDER - NSDCCPCAREPLAN_GEN_ALL_CORE_FT
PRINCIPAL DISCHARGE DIAGNOSIS  Diagnosis: Pericarditis  Assessment and Plan of Treatment: You were admitted for management of pericarditis. This is a condition in which the sac surrounding the heart becomes inflamed. This is often caused by a virus. You were treated with anti-inflammatory medications including aspirin and colchicine. You were also treated symptomatically with Tylenol, topical diclofenac, and supplemental oxygen as needed. Please continue to take colchicine *** on discharge.  Please follow up with your PCP, as well as your cardiologist, after leaving the hospital. It is very important that you attend these follow-up appointments and take all medications as prescribed on discharge.  If you develop new or worsening symptoms, such as chest pain, difficulty breathing, dizziness, fatigue, or heart palpitations, please call your doctor or go directly to the nearest emergency room.      SECONDARY DISCHARGE DIAGNOSES  Diagnosis: Atrial fibrillation  Assessment and Plan of Treatment: You developed a cardiac arrhythmia, or abnormal heart rhythm, called atrial fibrillation. Metoprolol, which is a medication that you were already taking for your history of coronary artery disease, is also effective for treating atrial fibrillation. Please continue to take this medication as prescribed.   In addition, atrial fibrillation is associated with an increased risk for stroke. You were started on a blood thinner called Eliquis while in the hospital. This medication can lower the risk of stroke. Please continue to take this medication as prescribed on discharge.    Diagnosis: Diabetes  Assessment and Plan of Treatment: You have a history of diabetes and required insulin while admitted in the hospital. Your hemoglobin A1c, a marker of your blood sugar level over the past 3 months, was 8.5%. This indicates that your diabetes is not fully controlled. Please continue to take your diabetes medications as prescribed and follow up with your PCP for routine monitoring and management of diabetes. In addition, please limit your carbohydrate intake, and try to lead an active lifestyle with regular exercise.     PRINCIPAL DISCHARGE DIAGNOSIS  Diagnosis: Pericarditis  Assessment and Plan of Treatment: You were admitted for management of pericarditis. This is a condition in which the sac surrounding the heart becomes inflamed. This is often caused by a virus. You were treated with anti-inflammatory medications including aspirin and colchicine. You were also treated symptomatically with Tylenol, topical diclofenac, and supplemental oxygen as needed. Please continue to take colchicine for 3 months on discharge.  Please follow up with your PCP, as well as your cardiologist, after leaving the hospital. It is very important that you attend these follow-up appointments and take all medications as prescribed on discharge.  If you develop new or worsening symptoms, such as chest pain, difficulty breathing, dizziness, fatigue, or heart palpitations, please call your doctor or go directly to the nearest emergency room.      SECONDARY DISCHARGE DIAGNOSES  Diagnosis: Diabetes  Assessment and Plan of Treatment: You have a history of diabetes and required insulin while admitted in the hospital. Your hemoglobin A1c, a marker of your blood sugar level over the past 3 months, was 8.5%. This indicates that your diabetes is not fully controlled. Please continue to take your diabetes medications as prescribed and follow up with your PCP for routine monitoring and management of diabetes. In addition, please limit your carbohydrate intake, and try to lead an active lifestyle with regular exercise.  You should follow up with your primary care physician within 1 week of discharge for diabetes management. You should check your finger sticks and blood glucose daily if it is greater then 400 then seek medical attention. If it is less then 70 please drink fruit juice or sugary snack. If it does not improve or you feel dizzy or palpitations seek medical attention immediately.    Diagnosis: Atrial fibrillation  Assessment and Plan of Treatment: You developed a cardiac arrhythmia, or abnormal heart rhythm, called atrial fibrillation. Metoprolol, which is a medication that you were already taking for your history of coronary artery disease, is also effective for treating atrial fibrillation. Please continue to take this medication as prescribed.   In addition, atrial fibrillation is associated with an increased risk for stroke. You were started on a blood thinner called Eliquis while in the hospital. This medication can lower the risk of stroke. Please continue to take this medication as prescribed on discharge.  If you have chest pain or palpitations or shortness of breath please seek medical attention.     PRINCIPAL DISCHARGE DIAGNOSIS  Diagnosis: Pericarditis  Assessment and Plan of Treatment: You were admitted for management of pericarditis. This is a condition in which the sac surrounding the heart becomes inflamed. This is often caused by a virus. You were treated with anti-inflammatory medications including aspirin and colchicine. You were also treated symptomatically with Tylenol, topical diclofenac, and supplemental oxygen as needed. Please continue to take colchicine for 3 months on discharge.  Please follow up with your PCP, as well as your cardiologist, after leaving the hospital. It is very important that you attend these follow-up appointments and take all medications as prescribed on discharge.  If you develop new or worsening symptoms, such as chest pain, difficulty breathing, dizziness, fatigue, or heart palpitations, please call your doctor or go directly to the nearest emergency room.      SECONDARY DISCHARGE DIAGNOSES  Diagnosis: Diabetes  Assessment and Plan of Treatment: You have a history of diabetes and required insulin while admitted in the hospital. Your hemoglobin A1c, a marker of your blood sugar level over the past 3 months, was 8.5%. This indicates that your diabetes is not fully controlled. Please continue to take your diabetes medications as prescribed and follow up with your PCP for routine monitoring and management of diabetes. In addition, please limit your carbohydrate intake, and try to lead an active lifestyle with regular exercise.  You should follow up with your primary care physician within 1 week of discharge for diabetes management. You should check your finger sticks and blood glucose daily if it is greater then 400 then seek medical attention. If it is less then 70 please drink fruit juice or sugary snack. If it does not improve or you feel dizzy or palpitations seek medical attention immediately.    Diagnosis: Atrial fibrillation  Assessment and Plan of Treatment: You developed a cardiac arrhythmia, or abnormal heart rhythm, called atrial fibrillation. Metoprolol, which is a medication that you were already taking for your history of coronary artery disease, is also effective for treating atrial fibrillation. Please continue to take this medication as prescribed.   In addition, atrial fibrillation is associated with an increased risk for stroke. You were started on a blood thinner called Eliquis while in the hospital. This medication can lower the risk of stroke. Please continue to take this medication as prescribed on discharge.  If you have chest pain or palpitations or shortness of breath please seek medical attention.    Diagnosis: Hypertension  Assessment and Plan of Treatment: You have a history of high blood pressure. Your home medications were continued. Your home hydralazine was increased to 75mg three times a day. You should follow up with your primary care physician on 4/13 at 2;20 for follow up on your blood pressure.   You were given a prescription for a blood pressure cuff. You should obtain one and check your blood pressure at home. If it increases higher then 200 for the top number or decreases below 90 for the low number contact a healthcare provider immediately.   If you develop dizziness or loss of consciousness seek immediate medical attention.

## 2023-04-09 LAB
ANION GAP SERPL CALC-SCNC: 9 MMOL/L — SIGNIFICANT CHANGE UP (ref 5–17)
BUN SERPL-MCNC: 8 MG/DL — SIGNIFICANT CHANGE UP (ref 7–23)
CALCIUM SERPL-MCNC: 8.5 MG/DL — SIGNIFICANT CHANGE UP (ref 8.4–10.5)
CHLORIDE SERPL-SCNC: 101 MMOL/L — SIGNIFICANT CHANGE UP (ref 96–108)
CO2 SERPL-SCNC: 27 MMOL/L — SIGNIFICANT CHANGE UP (ref 22–31)
CREAT SERPL-MCNC: 1.06 MG/DL — SIGNIFICANT CHANGE UP (ref 0.5–1.3)
EGFR: 56 ML/MIN/1.73M2 — LOW
GLUCOSE BLDC GLUCOMTR-MCNC: 149 MG/DL — HIGH (ref 70–99)
GLUCOSE BLDC GLUCOMTR-MCNC: 170 MG/DL — HIGH (ref 70–99)
GLUCOSE BLDC GLUCOMTR-MCNC: 243 MG/DL — HIGH (ref 70–99)
GLUCOSE BLDC GLUCOMTR-MCNC: 256 MG/DL — HIGH (ref 70–99)
GLUCOSE SERPL-MCNC: 130 MG/DL — HIGH (ref 70–99)
HCT VFR BLD CALC: 28.5 % — LOW (ref 34.5–45)
HGB BLD-MCNC: 8.7 G/DL — LOW (ref 11.5–15.5)
MAGNESIUM SERPL-MCNC: 1.6 MG/DL — SIGNIFICANT CHANGE UP (ref 1.6–2.6)
MCHC RBC-ENTMCNC: 24.3 PG — LOW (ref 27–34)
MCHC RBC-ENTMCNC: 30.5 GM/DL — LOW (ref 32–36)
MCV RBC AUTO: 79.6 FL — LOW (ref 80–100)
NRBC # BLD: 0 /100 WBCS — SIGNIFICANT CHANGE UP (ref 0–0)
PHOSPHATE SERPL-MCNC: 2.2 MG/DL — LOW (ref 2.5–4.5)
PLATELET # BLD AUTO: 493 K/UL — HIGH (ref 150–400)
POTASSIUM SERPL-MCNC: 3.9 MMOL/L — SIGNIFICANT CHANGE UP (ref 3.5–5.3)
POTASSIUM SERPL-SCNC: 3.9 MMOL/L — SIGNIFICANT CHANGE UP (ref 3.5–5.3)
RBC # BLD: 3.58 M/UL — LOW (ref 3.8–5.2)
RBC # FLD: 17.1 % — HIGH (ref 10.3–14.5)
SODIUM SERPL-SCNC: 137 MMOL/L — SIGNIFICANT CHANGE UP (ref 135–145)
WBC # BLD: 9.32 K/UL — SIGNIFICANT CHANGE UP (ref 3.8–10.5)
WBC # FLD AUTO: 9.32 K/UL — SIGNIFICANT CHANGE UP (ref 3.8–10.5)

## 2023-04-09 PROCEDURE — 99233 SBSQ HOSP IP/OBS HIGH 50: CPT | Mod: GC

## 2023-04-09 RX ORDER — MAGNESIUM SULFATE 500 MG/ML
2 VIAL (ML) INJECTION ONCE
Refills: 0 | Status: COMPLETED | OUTPATIENT
Start: 2023-04-09 | End: 2023-04-09

## 2023-04-09 RX ORDER — SODIUM,POTASSIUM PHOSPHATES 278-250MG
1 POWDER IN PACKET (EA) ORAL ONCE
Refills: 0 | Status: COMPLETED | OUTPATIENT
Start: 2023-04-09 | End: 2023-04-09

## 2023-04-09 RX ADMIN — Medication 50 MILLIGRAM(S): at 05:23

## 2023-04-09 RX ADMIN — Medication 81 MILLIGRAM(S): at 11:48

## 2023-04-09 RX ADMIN — Medication 0.1 MILLIGRAM(S): at 17:51

## 2023-04-09 RX ADMIN — GABAPENTIN 300 MILLIGRAM(S): 400 CAPSULE ORAL at 21:51

## 2023-04-09 RX ADMIN — APIXABAN 5 MILLIGRAM(S): 2.5 TABLET, FILM COATED ORAL at 17:51

## 2023-04-09 RX ADMIN — PANTOPRAZOLE SODIUM 40 MILLIGRAM(S): 20 TABLET, DELAYED RELEASE ORAL at 05:23

## 2023-04-09 RX ADMIN — Medication 50 MILLIGRAM(S): at 17:51

## 2023-04-09 RX ADMIN — GABAPENTIN 300 MILLIGRAM(S): 400 CAPSULE ORAL at 13:41

## 2023-04-09 RX ADMIN — Medication 0.6 MILLIGRAM(S): at 17:51

## 2023-04-09 RX ADMIN — Medication 50 MILLIGRAM(S): at 05:26

## 2023-04-09 RX ADMIN — Medication 20 MILLIGRAM(S): at 08:42

## 2023-04-09 RX ADMIN — Medication 20 MILLIGRAM(S): at 17:54

## 2023-04-09 RX ADMIN — Medication 6 UNIT(S): at 11:48

## 2023-04-09 RX ADMIN — Medication 6 UNIT(S): at 17:50

## 2023-04-09 RX ADMIN — Medication 6 UNIT(S): at 08:43

## 2023-04-09 RX ADMIN — Medication 0.6 MILLIGRAM(S): at 05:22

## 2023-04-09 RX ADMIN — Medication 0.1 MILLIGRAM(S): at 05:23

## 2023-04-09 RX ADMIN — Medication 1 PACKET(S): at 08:42

## 2023-04-09 RX ADMIN — APIXABAN 5 MILLIGRAM(S): 2.5 TABLET, FILM COATED ORAL at 05:23

## 2023-04-09 RX ADMIN — Medication 6: at 11:47

## 2023-04-09 RX ADMIN — Medication 2: at 08:43

## 2023-04-09 RX ADMIN — GABAPENTIN 300 MILLIGRAM(S): 400 CAPSULE ORAL at 05:22

## 2023-04-09 RX ADMIN — SIMVASTATIN 40 MILLIGRAM(S): 20 TABLET, FILM COATED ORAL at 21:51

## 2023-04-09 NOTE — PROGRESS NOTE ADULT - PROBLEM SELECTOR PLAN 7
Pt on enalapril 20 bid, clonidine 0.1 bid, hydral 50 bid at home  - BPs have been stably elevated (max systolics to 180s)  - restarted hydral 50 bid 4/7  - restarted clonidine 0.1 bid 4/8  - restart enalapril 20mg BID 4/9

## 2023-04-09 NOTE — PROVIDER CONTACT NOTE (OTHER) - ACTION/TREATMENT ORDERED:
Awaiting new orders
provider said order is correct
MD made aware. Lopressor 5mg IVP ordered x2, heparin gtt started, NS started. Continue to monitor on tele
okay to not give mag and okay not to obtain new PIV, possibly pt dc in am
Awaiting new orders
MD made aware. Stat VBG/Lactate and EKG ordered
Awaiting new orders
Provider notified, agreed to Nomogram instructions and advised the RN to change the rate to 14 ml/hr.

## 2023-04-09 NOTE — PROVIDER CONTACT NOTE (OTHER) - SITUATION
Nurse questioned order of aspirin 650mg q8hrs
Pt noted to be tachpneic, O2 reading 80%, hypertensive up to 170s systolic..
Notified by tele pt converted from sinus tachy to afib w/ HR fluctuating 120s-140s
as per tele monitor patient had 5 beats wct
Upon administering IV Mag, IV noted to not flush and painful as per pt. Pt refusing to have new PIV inserted
As per cardiac monitor patient's HR sustaining 140s
As per cardiac monitor patient's 
Pt's aPTT 50.3

## 2023-04-09 NOTE — PROGRESS NOTE ADULT - ATTENDING COMMENTS
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    -Appreciate cardiology recommendations. POCUS more suggestive for moderate effusion w/o signs of tamponade physiology. Pt developed new onset afib w/ rvr during admission; repeat ECHO done showing decrease in size of effusion   -  heparin gtt f--> DOAC on 4/8  - cardio recs noted; dc high dose asa; continue with colchicine for pericarditis for now and monitor response; was started on ASA 81mg 4/8 in setting of CAD history   -Possible viral cause of pericarditis and pericardial effusion? Pt's chest pain seems pleuritic in nature  -Trend PTT, vital signs frequently  -Telemetry --> pt converted to NSR on 4/8 as per Tele   -originally holding antihypertensives; will slowly reintroduce home meds; resume hydralazine for now; resume CLonidine  and monitor BP   -PPI prophylaxis  - anemia --> no signs of bleeding noted; stable; continue iron supplementation   - no longer need plavix since stent is from 2019  - A1c 8.5   -F/u cardiology recommendations   - PT eval ----> oupt PT   MOnitor SPO2 --> 6 mns walk and monitor SPO2       Olga Ward   HOspitalist   [phone]   Available on TEAMS . 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    -Appreciate cardiology recommendations. POCUS more suggestive for moderate effusion w/o signs of tamponade physiology. Pt developed new onset afib w/ rvr during admission; repeat ECHO done showing decrease in size of effusion   -  heparin gtt f--> DOAC on 4/8  - cardio recs noted; dc high dose asa; continue with colchicine for pericarditis for now and monitor response; was started on ASA 81mg 4/8 in setting of CAD history   -Possible viral cause of pericarditis and pericardial effusion? Pt's chest pain seems pleuritic in nature  -Trend PTT, vital signs frequently  -Telemetry --> pt converted to NSR on 4/8 as per Tele   -originally holding antihypertensives; will slowly reintroduce home meds; resume hydralazine for now; resume CLonidine  and monitor BP   -PPI prophylaxis  - anemia --> no signs of bleeding noted; stable; continue iron supplementation   - no longer need plavix since stent is from 2019  - A1c 8.5   -F/u cardiology recommendations   - PT eval ----> oupt PT   MOnitor SPO2 --> 6 mns walk and monitor SPO2 / as it was reported that she dropped SPO2 in the 70's and 80's overnight   f/up repeat TTE       Olga Ward   HOspitalist   [phone]   Available on TEAMS .

## 2023-04-09 NOTE — PROVIDER CONTACT NOTE (OTHER) - REASON
tele event
HR
hypertension, hypoxia, tachypnea
loss of IV access
Pt's aPTT 50.3
aspirin schedule/dosage
HR
cardiac rhythm

## 2023-04-09 NOTE — PROVIDER CONTACT NOTE (OTHER) - ASSESSMENT
No complaints of cp, sob ,palpitations, no s/s of distress noted
Patient noted seating comfortably, no complaints of cp, sob, palpitations or s/s of distress noted
BP elevated into 170s systolic, HR high up to 140s, pt c/o of SOB
No complaints of cp, sob or s/s of distress noted
site drainage, unable to flush
Pt AXOX4, VSS, no c/o s/s, in no acute distress,
BP 170s systolic, satting 80%, belly breathing, diaphoretic

## 2023-04-09 NOTE — PROGRESS NOTE ADULT - PROBLEM SELECTOR PLAN 2
New onset this admission. DARRELL-Vasc 5. Rates up to 140 given IV lopressor 5 x2 rates now 120s.   - TSH wnl  - hemodynamically stable  - c/w metop 50 bid (home dose)  - s/p heparin gtt (d/c 4/8)  - c/w eliquis (started 4/9), Eliquis covered by insurance, can send home with new rx upon discharge   - improved rate control on telemetry

## 2023-04-09 NOTE — PROGRESS NOTE ADULT - SUBJECTIVE AND OBJECTIVE BOX
Authored by April Silva MD, PGY3  PATIENT:  JUAN R NLN  [id number]    CHIEF COMPLAINT:  Patient is a 72y old  Female who presents with a chief complaint of Chest pain (08 Apr 2023 16:11)      INTERVAL HISTORY OVERNIGHT EVENTS: BEV overnight.         MEDICATIONS:  MEDICATIONS  (STANDING):  apixaban 5 milliGRAM(s) Oral every 12 hours  aspirin  chewable 81 milliGRAM(s) Oral daily  cloNIDine 0.1 milliGRAM(s) Oral two times a day  colchicine 0.6 milliGRAM(s) Oral every 12 hours  dextrose 5%. 1000 milliLiter(s) (50 mL/Hr) IV Continuous <Continuous>  dextrose 5%. 1000 milliLiter(s) (100 mL/Hr) IV Continuous <Continuous>  dextrose 50% Injectable 25 Gram(s) IV Push once  dextrose 50% Injectable 12.5 Gram(s) IV Push once  dextrose 50% Injectable 25 Gram(s) IV Push once  enalapril 20 milliGRAM(s) Oral every 12 hours  ferrous    sulfate 325 milliGRAM(s) Oral <User Schedule>  gabapentin 300 milliGRAM(s) Oral three times a day  glucagon  Injectable 1 milliGRAM(s) IntraMuscular once  hydrALAZINE 50 milliGRAM(s) Oral two times a day  insulin lispro (ADMELOG) corrective regimen sliding scale   SubCutaneous three times a day before meals  insulin lispro (ADMELOG) corrective regimen sliding scale   SubCutaneous at bedtime  insulin lispro Injectable (ADMELOG) 6 Unit(s) SubCutaneous three times a day before meals  magnesium sulfate  IVPB 2 Gram(s) IV Intermittent once  metoprolol tartrate 50 milliGRAM(s) Oral every 12 hours  pantoprazole    Tablet 40 milliGRAM(s) Oral before breakfast  potassium phosphate / sodium phosphate Powder (PHOS-NaK) 1 Packet(s) Oral once  simvastatin 40 milliGRAM(s) Oral at bedtime    MEDICATIONS  (PRN):  acetaminophen     Tablet .. 650 milliGRAM(s) Oral every 6 hours PRN Mild Pain (1 - 3)  dextrose Oral Gel 15 Gram(s) Oral once PRN Blood Glucose LESS THAN 70 milliGRAM(s)/deciliter  diclofenac sodium 1% Gel 2 Gram(s) Topical two times a day PRN shoulder pain  oxyCODONE    IR 5 milliGRAM(s) Oral every 6 hours PRN Moderate Pain (4 - 6)  oxyCODONE    IR 10 milliGRAM(s) Oral every 6 hours PRN Severe Pain (7 - 10)      ALLERGIES:  Allergies    felodipine (Unknown)    Intolerances        OBJECTIVE:  ICU Vital Signs Last 24 Hrs  T(C): 37 (09 Apr 2023 05:10), Max: 37 (09 Apr 2023 05:10)  T(F): 98.6 (09 Apr 2023 05:10), Max: 98.6 (09 Apr 2023 05:10)  HR: 95 (09 Apr 2023 05:10) (75 - 106)  BP: 165/74 (09 Apr 2023 05:10) (150/70 - 182/76)  BP(mean): --  ABP: --  ABP(mean): --  RR: 18 (09 Apr 2023 05:10) (18 - 18)  SpO2: 94% (09 Apr 2023 05:10) (94% - 96%)    O2 Parameters below as of 09 Apr 2023 05:10  Patient On (Oxygen Delivery Method): room air            CAPILLARY BLOOD GLUCOSE      POCT Blood Glucose.: 229 mg/dL (08 Apr 2023 21:35)  POCT Blood Glucose.: 173 mg/dL (08 Apr 2023 16:51)  POCT Blood Glucose.: 262 mg/dL (08 Apr 2023 11:34)  POCT Blood Glucose.: 292 mg/dL (08 Apr 2023 08:10)    CAPILLARY BLOOD GLUCOSE      POCT Blood Glucose.: 229 mg/dL (08 Apr 2023 21:35)    I&O's Summary    08 Apr 2023 07:01  -  09 Apr 2023 07:00  --------------------------------------------------------  IN: 358 mL / OUT: 0 mL / NET: 358 mL      Daily     Daily     PHYSICAL EXAMINATION:  GENERAL: Uncomfortable appearing  HEAD:  Atraumatic, normocephalic  EYES: EOMI, PERRLA, conjunctiva and sclera clear  NECK: Supple, no JVD  CHEST/LUNG: Mild intermittent wheeze. No rales or rhonchi. Appears mildly labored on room air with shallow breaths, belly breathing  HEART: Regular rate & rhythm. No murmurs or rubs  ABDOMEN: Soft, nontender, nondistended; Bowel sounds present  EXTREMITIES:  No LE edema  PSYCH: AAOx3  NEUROLOGY: No focal deficits  SKIN: No rashes or lesions      LABS:                          8.7    9.32  )-----------( 493      ( 09 Apr 2023 05:58 )             28.5     04-09    137  |  101  |  8   ----------------------------<  130<H>  3.9   |  27  |  1.06    Ca    8.5      09 Apr 2023 05:57  Phos  2.2     04-09  Mg     1.6     04-09        PTT - ( 08 Apr 2023 12:38 )  PTT:79.7 sec            TELEMETRY:     EKG:     IMAGING:

## 2023-04-09 NOTE — PROGRESS NOTE ADULT - PROBLEM SELECTOR PLAN 1
Recent viral illness last week? Now with worsening chest pain, concern for pericarditis after recent URI.   - limited TTE on admission shows moderate pericardial effusion with no tamponade physiology  - CRP elevated (286), proBNP elevated (2285). Trops wnl  - s/p  q8h, 4/5-4/6  - c/w colchicine 0.6 q12h  - repeat full TTE 4/6 shows small pericardial effusion, no hemodynamic compromise  - pain control: Tylenol prn, oxy prn for severe pain, topical diclofenac  - cards recs appreciated; no need for urgent drainage per cards given hemodynamic stability

## 2023-04-09 NOTE — PROVIDER CONTACT NOTE (OTHER) - NAME OF MD/NP/PA/DO NOTIFIED:
April Silva MD
Lamberto Zuniga
Lamberto Zuniga
MD Moises Becerra
MD Mirna Henderson
Mirna Henderson, T2
Sophie Shallow
Lamberto Zuniga

## 2023-04-09 NOTE — PROVIDER CONTACT NOTE (OTHER) - DATE AND TIME:
06-Apr-2023 22:29
07-Apr-2023 22:21
06-Apr-2023 12:35
06-Apr-2023 13:33
06-Apr-2023 17:23
09-Apr-2023 08:53
07-Apr-2023 18:07
05-Apr-2023 20:45

## 2023-04-10 LAB
ANA PAT FLD IF-IMP: ABNORMAL
ANA TITR SER: ABNORMAL
ANION GAP SERPL CALC-SCNC: 13 MMOL/L — SIGNIFICANT CHANGE UP (ref 5–17)
BUN SERPL-MCNC: 8 MG/DL — SIGNIFICANT CHANGE UP (ref 7–23)
CALCIUM SERPL-MCNC: 8.6 MG/DL — SIGNIFICANT CHANGE UP (ref 8.4–10.5)
CHLORIDE SERPL-SCNC: 103 MMOL/L — SIGNIFICANT CHANGE UP (ref 96–108)
CO2 SERPL-SCNC: 26 MMOL/L — SIGNIFICANT CHANGE UP (ref 22–31)
CREAT SERPL-MCNC: 1.1 MG/DL — SIGNIFICANT CHANGE UP (ref 0.5–1.3)
EGFR: 53 ML/MIN/1.73M2 — LOW
FERRITIN SERPL-MCNC: 200 NG/ML — HIGH (ref 15–150)
GLUCOSE BLDC GLUCOMTR-MCNC: 126 MG/DL — HIGH (ref 70–99)
GLUCOSE BLDC GLUCOMTR-MCNC: 129 MG/DL — HIGH (ref 70–99)
GLUCOSE BLDC GLUCOMTR-MCNC: 254 MG/DL — HIGH (ref 70–99)
GLUCOSE BLDC GLUCOMTR-MCNC: 312 MG/DL — HIGH (ref 70–99)
GLUCOSE BLDC GLUCOMTR-MCNC: 77 MG/DL — SIGNIFICANT CHANGE UP (ref 70–99)
GLUCOSE BLDC GLUCOMTR-MCNC: 87 MG/DL — SIGNIFICANT CHANGE UP (ref 70–99)
GLUCOSE SERPL-MCNC: 126 MG/DL — HIGH (ref 70–99)
HCT VFR BLD CALC: 31 % — LOW (ref 34.5–45)
HGB BLD-MCNC: 9.3 G/DL — LOW (ref 11.5–15.5)
IRON SATN MFR SERPL: 17 % — SIGNIFICANT CHANGE UP (ref 14–50)
IRON SATN MFR SERPL: 35 UG/DL — SIGNIFICANT CHANGE UP (ref 30–160)
MAGNESIUM SERPL-MCNC: 1.7 MG/DL — SIGNIFICANT CHANGE UP (ref 1.6–2.6)
MCHC RBC-ENTMCNC: 24.1 PG — LOW (ref 27–34)
MCHC RBC-ENTMCNC: 30 GM/DL — LOW (ref 32–36)
MCV RBC AUTO: 80.3 FL — SIGNIFICANT CHANGE UP (ref 80–100)
NRBC # BLD: 0 /100 WBCS — SIGNIFICANT CHANGE UP (ref 0–0)
PHOSPHATE SERPL-MCNC: 2 MG/DL — LOW (ref 2.5–4.5)
PLATELET # BLD AUTO: 540 K/UL — HIGH (ref 150–400)
POTASSIUM SERPL-MCNC: 4 MMOL/L — SIGNIFICANT CHANGE UP (ref 3.5–5.3)
POTASSIUM SERPL-SCNC: 4 MMOL/L — SIGNIFICANT CHANGE UP (ref 3.5–5.3)
RBC # BLD: 3.86 M/UL — SIGNIFICANT CHANGE UP (ref 3.8–5.2)
RBC # FLD: 17.5 % — HIGH (ref 10.3–14.5)
SODIUM SERPL-SCNC: 142 MMOL/L — SIGNIFICANT CHANGE UP (ref 135–145)
TIBC SERPL-MCNC: 207 UG/DL — LOW (ref 220–430)
UIBC SERPL-MCNC: 173 UG/DL — SIGNIFICANT CHANGE UP (ref 110–370)
WBC # BLD: 9.32 K/UL — SIGNIFICANT CHANGE UP (ref 3.8–10.5)
WBC # FLD AUTO: 9.32 K/UL — SIGNIFICANT CHANGE UP (ref 3.8–10.5)

## 2023-04-10 PROCEDURE — 99232 SBSQ HOSP IP/OBS MODERATE 35: CPT | Mod: GC

## 2023-04-10 PROCEDURE — 93308 TTE F-UP OR LMTD: CPT | Mod: 26

## 2023-04-10 RX ORDER — ATORVASTATIN CALCIUM 80 MG/1
1 TABLET, FILM COATED ORAL
Qty: 30 | Refills: 3
Start: 2023-04-10

## 2023-04-10 RX ORDER — ATORVASTATIN CALCIUM 80 MG/1
1 TABLET, FILM COATED ORAL
Qty: 0 | Refills: 0 | DISCHARGE
Start: 2023-04-10

## 2023-04-10 RX ORDER — METFORMIN HYDROCHLORIDE 850 MG/1
1 TABLET ORAL
Qty: 0 | Refills: 0 | DISCHARGE

## 2023-04-10 RX ORDER — APIXABAN 2.5 MG/1
1 TABLET, FILM COATED ORAL
Qty: 0 | Refills: 0 | DISCHARGE
Start: 2023-04-10

## 2023-04-10 RX ORDER — POLYETHYLENE GLYCOL 3350 17 G/17G
17 POWDER, FOR SOLUTION ORAL DAILY
Refills: 0 | Status: DISCONTINUED | OUTPATIENT
Start: 2023-04-10 | End: 2023-04-11

## 2023-04-10 RX ORDER — ATORVASTATIN CALCIUM 80 MG/1
20 TABLET, FILM COATED ORAL AT BEDTIME
Refills: 0 | Status: DISCONTINUED | OUTPATIENT
Start: 2023-04-10 | End: 2023-04-11

## 2023-04-10 RX ORDER — HYDRALAZINE HCL 50 MG
1 TABLET ORAL
Qty: 0 | Refills: 0 | DISCHARGE
Start: 2023-04-10

## 2023-04-10 RX ORDER — INSULIN LISPRO 100/ML
8 VIAL (ML) SUBCUTANEOUS
Refills: 0 | Status: DISCONTINUED | OUTPATIENT
Start: 2023-04-10 | End: 2023-04-11

## 2023-04-10 RX ORDER — HYDRALAZINE HCL 50 MG
50 TABLET ORAL THREE TIMES A DAY
Refills: 0 | Status: DISCONTINUED | OUTPATIENT
Start: 2023-04-10 | End: 2023-04-11

## 2023-04-10 RX ORDER — SENNA PLUS 8.6 MG/1
2 TABLET ORAL AT BEDTIME
Refills: 0 | Status: DISCONTINUED | OUTPATIENT
Start: 2023-04-10 | End: 2023-04-11

## 2023-04-10 RX ORDER — HYDRALAZINE HCL 50 MG
1 TABLET ORAL
Qty: 0 | Refills: 0 | DISCHARGE

## 2023-04-10 RX ORDER — INSULIN LISPRO 100/ML
6 VIAL (ML) SUBCUTANEOUS
Refills: 0 | Status: DISCONTINUED | OUTPATIENT
Start: 2023-04-10 | End: 2023-04-10

## 2023-04-10 RX ORDER — APIXABAN 2.5 MG/1
1 TABLET, FILM COATED ORAL
Qty: 30 | Refills: 3
Start: 2023-04-10

## 2023-04-10 RX ORDER — FAMOTIDINE 10 MG/ML
20 INJECTION INTRAVENOUS DAILY
Refills: 0 | Status: DISCONTINUED | OUTPATIENT
Start: 2023-04-10 | End: 2023-04-11

## 2023-04-10 RX ORDER — INSULIN GLARGINE 100 [IU]/ML
5 INJECTION, SOLUTION SUBCUTANEOUS AT BEDTIME
Refills: 0 | Status: DISCONTINUED | OUTPATIENT
Start: 2023-04-10 | End: 2023-04-11

## 2023-04-10 RX ORDER — SODIUM,POTASSIUM PHOSPHATES 278-250MG
1 POWDER IN PACKET (EA) ORAL ONCE
Refills: 0 | Status: COMPLETED | OUTPATIENT
Start: 2023-04-10 | End: 2023-04-10

## 2023-04-10 RX ORDER — SIMVASTATIN 20 MG/1
1 TABLET, FILM COATED ORAL
Qty: 0 | Refills: 0 | DISCHARGE

## 2023-04-10 RX ADMIN — Medication 81 MILLIGRAM(S): at 11:49

## 2023-04-10 RX ADMIN — PANTOPRAZOLE SODIUM 40 MILLIGRAM(S): 20 TABLET, DELAYED RELEASE ORAL at 05:34

## 2023-04-10 RX ADMIN — Medication 50 MILLIGRAM(S): at 12:52

## 2023-04-10 RX ADMIN — Medication 8 UNIT(S): at 17:19

## 2023-04-10 RX ADMIN — Medication 1 PACKET(S): at 11:04

## 2023-04-10 RX ADMIN — ATORVASTATIN CALCIUM 20 MILLIGRAM(S): 80 TABLET, FILM COATED ORAL at 21:36

## 2023-04-10 RX ADMIN — GABAPENTIN 300 MILLIGRAM(S): 400 CAPSULE ORAL at 20:50

## 2023-04-10 RX ADMIN — SENNA PLUS 2 TABLET(S): 8.6 TABLET ORAL at 20:50

## 2023-04-10 RX ADMIN — Medication 6 UNIT(S): at 08:02

## 2023-04-10 RX ADMIN — GABAPENTIN 300 MILLIGRAM(S): 400 CAPSULE ORAL at 12:52

## 2023-04-10 RX ADMIN — APIXABAN 5 MILLIGRAM(S): 2.5 TABLET, FILM COATED ORAL at 05:35

## 2023-04-10 RX ADMIN — Medication 50 MILLIGRAM(S): at 05:34

## 2023-04-10 RX ADMIN — Medication 50 MILLIGRAM(S): at 16:35

## 2023-04-10 RX ADMIN — GABAPENTIN 300 MILLIGRAM(S): 400 CAPSULE ORAL at 05:34

## 2023-04-10 RX ADMIN — Medication 0.6 MILLIGRAM(S): at 05:34

## 2023-04-10 RX ADMIN — Medication 50 MILLIGRAM(S): at 20:50

## 2023-04-10 RX ADMIN — Medication 8 UNIT(S): at 11:48

## 2023-04-10 RX ADMIN — APIXABAN 5 MILLIGRAM(S): 2.5 TABLET, FILM COATED ORAL at 16:35

## 2023-04-10 RX ADMIN — Medication 325 MILLIGRAM(S): at 05:34

## 2023-04-10 RX ADMIN — Medication 8: at 08:01

## 2023-04-10 RX ADMIN — Medication 0.1 MILLIGRAM(S): at 05:34

## 2023-04-10 RX ADMIN — Medication 20 MILLIGRAM(S): at 16:36

## 2023-04-10 RX ADMIN — Medication 0.6 MILLIGRAM(S): at 16:36

## 2023-04-10 RX ADMIN — Medication 0.1 MILLIGRAM(S): at 16:35

## 2023-04-10 RX ADMIN — INSULIN GLARGINE 5 UNIT(S): 100 INJECTION, SOLUTION SUBCUTANEOUS at 22:40

## 2023-04-10 RX ADMIN — Medication 20 MILLIGRAM(S): at 05:35

## 2023-04-10 RX ADMIN — Medication 6: at 11:48

## 2023-04-10 NOTE — PROGRESS NOTE ADULT - PROBLEM SELECTOR PLAN 7
Pt on enalapril 20 bid, clonidine 0.1 bid, hydral 50 bid at home  - BPs have been stably elevated (max systolics to 180s)  - restarted hydral 50 bid 4/7  - restarted clonidine 0.1 bid 4/8  - restart enalapril 20mg BID 4/9 Pt on enalapril 20 bid, clonidine 0.1 bid, hydral 50 bid at home  - BPs have been stably elevated (max systolics to 180s)  - inc hydral 50 tid 4/10  - restarted clonidine 0.1 bid 4/8  - restart enalapril 20mg BID 4/9

## 2023-04-10 NOTE — PROGRESS NOTE ADULT - ASSESSMENT
72F hx DM, HTN, CAD s/p stent (2019), admitted with worsening pleuritic CP x3 days, fevers x1 week, found to have large pericardial effusion on CT chest, no evidence tamponade on limited echo. Started on ASA/Colchicine for suspected pericarditis. Course c/b new onset AFib RVR, started on heparin gtt 72F hx DM, HTN, CAD s/p stent (2019), admitted with worsening pleuritic CP x3 days, fevers x1 week, found to have large pericardial effusion on CT chest, no evidence tamponade on limited echo. Started on ASA/Colchicine for suspected pericarditis with course c/b new onset AFib RVR.

## 2023-04-10 NOTE — PROGRESS NOTE ADULT - PROBLEM SELECTOR PLAN 3
SCr baseline 0.3-0.5. SCr on admission 1.10. Likely prerenal in setting of poor PO intake  - FeNa 0.8% c/w prerenal etiology  - SCr 0.97 today - downtrending  - ctm while on NSAIDs. Pt also did receive contrast for CTA SCr baseline 0.3-0.5. SCr on admission 1.10. Likely prerenal in setting of poor PO intake  - FeNa 0.8% c/w prerenal etiology  - SCr 0.97 today resolved   - ctm while on NSAIDs. Pt also did receive contrast for CTA

## 2023-04-10 NOTE — PROGRESS NOTE ADULT - SUBJECTIVE AND OBJECTIVE BOX
***************************************************************  Clarissa Do MD (PGY-1)  Internal Medicine  Pager: [phone]  ***************************************************************    PROGRESS NOTE:     Patient is a 72y old  Female who presents with a chief complaint of Chest pain (09 Apr 2023 07:20)      SUBJECTIVE / OVERNIGHT EVENTS: Patient seen and examined at bedside. No acute overnight events.    MEDICATIONS  (STANDING):  apixaban 5 milliGRAM(s) Oral every 12 hours  aspirin  chewable 81 milliGRAM(s) Oral daily  cloNIDine 0.1 milliGRAM(s) Oral two times a day  colchicine 0.6 milliGRAM(s) Oral every 12 hours  dextrose 5%. 1000 milliLiter(s) (100 mL/Hr) IV Continuous <Continuous>  dextrose 5%. 1000 milliLiter(s) (50 mL/Hr) IV Continuous <Continuous>  dextrose 50% Injectable 25 Gram(s) IV Push once  dextrose 50% Injectable 12.5 Gram(s) IV Push once  dextrose 50% Injectable 25 Gram(s) IV Push once  enalapril 20 milliGRAM(s) Oral every 12 hours  ferrous    sulfate 325 milliGRAM(s) Oral <User Schedule>  gabapentin 300 milliGRAM(s) Oral three times a day  glucagon  Injectable 1 milliGRAM(s) IntraMuscular once  hydrALAZINE 50 milliGRAM(s) Oral two times a day  insulin lispro (ADMELOG) corrective regimen sliding scale   SubCutaneous three times a day before meals  insulin lispro (ADMELOG) corrective regimen sliding scale   SubCutaneous at bedtime  insulin lispro Injectable (ADMELOG) 6 Unit(s) SubCutaneous three times a day before meals  metoprolol tartrate 50 milliGRAM(s) Oral every 12 hours  pantoprazole    Tablet 40 milliGRAM(s) Oral before breakfast  simvastatin 40 milliGRAM(s) Oral at bedtime    MEDICATIONS  (PRN):  acetaminophen     Tablet .. 650 milliGRAM(s) Oral every 6 hours PRN Mild Pain (1 - 3)  dextrose Oral Gel 15 Gram(s) Oral once PRN Blood Glucose LESS THAN 70 milliGRAM(s)/deciliter  diclofenac sodium 1% Gel 2 Gram(s) Topical two times a day PRN shoulder pain  oxyCODONE    IR 5 milliGRAM(s) Oral every 6 hours PRN Moderate Pain (4 - 6)  oxyCODONE    IR 10 milliGRAM(s) Oral every 6 hours PRN Severe Pain (7 - 10)      CAPILLARY BLOOD GLUCOSE      POCT Blood Glucose.: 243 mg/dL (09 Apr 2023 21:17)  POCT Blood Glucose.: 149 mg/dL (09 Apr 2023 16:51)  POCT Blood Glucose.: 256 mg/dL (09 Apr 2023 11:45)  POCT Blood Glucose.: 170 mg/dL (09 Apr 2023 08:02)    I&O's Summary    09 Apr 2023 07:01  -  10 Apr 2023 07:00  --------------------------------------------------------  IN: 236 mL / OUT: 0 mL / NET: 236 mL        PHYSICAL EXAM:  Vital Signs Last 24 Hrs  T(C): 36.6 (10 Apr 2023 04:31), Max: 37.2 (09 Apr 2023 20:23)  T(F): 97.8 (10 Apr 2023 04:31), Max: 99 (09 Apr 2023 20:23)  HR: 80 (10 Apr 2023 04:31) (79 - 80)  BP: 179/79 (10 Apr 2023 04:31) (152/69 - 179/79)  BP(mean): --  RR: 18 (10 Apr 2023 04:31) (18 - 19)  SpO2: 95% (10 Apr 2023 04:31) (95% - 98%)    Parameters below as of 10 Apr 2023 04:31  Patient On (Oxygen Delivery Method): room air      GENERAL: NAD  HEAD:  Atraumatic, normocephalic  EYES: EOMI, PERRLA, conjunctiva and sclera clear  NECK: Supple, no JVD  CHEST/LUNG: Mild intermittent wheeze. No rales or rhonchi. Appears mildly labored on room air with shallow breaths, belly breathing  HEART: Regular rate & rhythm. No murmurs or rubs  ABDOMEN: Soft, nontender, nondistended; Bowel sounds present  EXTREMITIES:  No LE edema  PSYCH: AAOx3  NEUROLOGY: No focal deficits  SKIN: No rashes or lesions    LABS:                        9.3    9.32  )-----------( 540      ( 10 Apr 2023 06:50 )             31.0     04-09    137  |  101  |  8   ----------------------------<  130<H>  3.9   |  27  |  1.06    Ca    8.5      09 Apr 2023 05:57  Phos  2.2     04-09  Mg     1.6     04-09      PTT - ( 08 Apr 2023 12:38 )  PTT:79.7 sec            RADIOLOGY & ADDITIONAL TESTS:  Results Reviewed:   Imaging Personally Reviewed:  Electrocardiogram Personally Reviewed:    COORDINATION OF CARE:  Care Discussed with Consultants/Other Providers [Y/N]:  Prior or Outpatient Records Reviewed [Y/N]:   ***************************************************************  Clarissa Do MD (PGY-1)  Internal Medicine  Pager: [phone]  ***************************************************************    PROGRESS NOTE:     Patient is a 72y old  Female who presents with a chief complaint of Chest pain (09 Apr 2023 07:20)      SUBJECTIVE / OVERNIGHT EVENTS: Patient seen and examined at bedside. No acute overnight events. Pt reports chest discomfort after eating. Denies CP, SOB, HA.    MEDICATIONS  (STANDING):  apixaban 5 milliGRAM(s) Oral every 12 hours  aspirin  chewable 81 milliGRAM(s) Oral daily  cloNIDine 0.1 milliGRAM(s) Oral two times a day  colchicine 0.6 milliGRAM(s) Oral every 12 hours  dextrose 5%. 1000 milliLiter(s) (100 mL/Hr) IV Continuous <Continuous>  dextrose 5%. 1000 milliLiter(s) (50 mL/Hr) IV Continuous <Continuous>  dextrose 50% Injectable 25 Gram(s) IV Push once  dextrose 50% Injectable 12.5 Gram(s) IV Push once  dextrose 50% Injectable 25 Gram(s) IV Push once  enalapril 20 milliGRAM(s) Oral every 12 hours  ferrous    sulfate 325 milliGRAM(s) Oral <User Schedule>  gabapentin 300 milliGRAM(s) Oral three times a day  glucagon  Injectable 1 milliGRAM(s) IntraMuscular once  hydrALAZINE 50 milliGRAM(s) Oral two times a day  insulin lispro (ADMELOG) corrective regimen sliding scale   SubCutaneous three times a day before meals  insulin lispro (ADMELOG) corrective regimen sliding scale   SubCutaneous at bedtime  insulin lispro Injectable (ADMELOG) 6 Unit(s) SubCutaneous three times a day before meals  metoprolol tartrate 50 milliGRAM(s) Oral every 12 hours  pantoprazole    Tablet 40 milliGRAM(s) Oral before breakfast  simvastatin 40 milliGRAM(s) Oral at bedtime    MEDICATIONS  (PRN):  acetaminophen     Tablet .. 650 milliGRAM(s) Oral every 6 hours PRN Mild Pain (1 - 3)  dextrose Oral Gel 15 Gram(s) Oral once PRN Blood Glucose LESS THAN 70 milliGRAM(s)/deciliter  diclofenac sodium 1% Gel 2 Gram(s) Topical two times a day PRN shoulder pain  oxyCODONE    IR 5 milliGRAM(s) Oral every 6 hours PRN Moderate Pain (4 - 6)  oxyCODONE    IR 10 milliGRAM(s) Oral every 6 hours PRN Severe Pain (7 - 10)      CAPILLARY BLOOD GLUCOSE      POCT Blood Glucose.: 243 mg/dL (09 Apr 2023 21:17)  POCT Blood Glucose.: 149 mg/dL (09 Apr 2023 16:51)  POCT Blood Glucose.: 256 mg/dL (09 Apr 2023 11:45)  POCT Blood Glucose.: 170 mg/dL (09 Apr 2023 08:02)    I&O's Summary    09 Apr 2023 07:01  -  10 Apr 2023 07:00  --------------------------------------------------------  IN: 236 mL / OUT: 0 mL / NET: 236 mL        PHYSICAL EXAM:  Vital Signs Last 24 Hrs  T(C): 36.6 (10 Apr 2023 04:31), Max: 37.2 (09 Apr 2023 20:23)  T(F): 97.8 (10 Apr 2023 04:31), Max: 99 (09 Apr 2023 20:23)  HR: 80 (10 Apr 2023 04:31) (79 - 80)  BP: 179/79 (10 Apr 2023 04:31) (152/69 - 179/79)  BP(mean): --  RR: 18 (10 Apr 2023 04:31) (18 - 19)  SpO2: 95% (10 Apr 2023 04:31) (95% - 98%)    Parameters below as of 10 Apr 2023 04:31  Patient On (Oxygen Delivery Method): room air      GENERAL: NAD  HEAD:  Atraumatic, normocephalic  EYES: EOMI, PERRLA, conjunctiva and sclera clear  NECK: Supple, no JVD  CHEST/LUNG: Mild intermittent wheeze. No rales or rhonchi.  HEART: Regular rate & rhythm. No murmurs or rubs  ABDOMEN: Soft, epigastric ttp, mildly distended; Bowel sounds present  EXTREMITIES:  No LE edema  PSYCH: AAOx3  NEUROLOGY: No focal deficits  SKIN: No rashes or lesions    LABS:                        9.3    9.32  )-----------( 540      ( 10 Apr 2023 06:50 )             31.0     04-09    137  |  101  |  8   ----------------------------<  130<H>  3.9   |  27  |  1.06    Ca    8.5      09 Apr 2023 05:57  Phos  2.2     04-09  Mg     1.6     04-09      PTT - ( 08 Apr 2023 12:38 )  PTT:79.7 sec            RADIOLOGY & ADDITIONAL TESTS:  Results Reviewed:   Imaging Personally Reviewed:  Electrocardiogram Personally Reviewed:    COORDINATION OF CARE:  Care Discussed with Consultants/Other Providers [Y/N]:  Prior or Outpatient Records Reviewed [Y/N]:   ***************************************************************  Clarissa oD MD (PGY-1)  Internal Medicine  Pager: [phone]  ***************************************************************    PROGRESS NOTE:     Patient is a 72y old  Female who presents with a chief complaint of Chest pain (09 Apr 2023 07:20)      SUBJECTIVE / OVERNIGHT EVENTS: Patient seen and examined at bedside. No acute overnight events. Pt reports chest discomfort after eating which has later improved. Denies CP, SOB, HA.    MEDICATIONS  (STANDING):  apixaban 5 milliGRAM(s) Oral every 12 hours  aspirin  chewable 81 milliGRAM(s) Oral daily  cloNIDine 0.1 milliGRAM(s) Oral two times a day  colchicine 0.6 milliGRAM(s) Oral every 12 hours  dextrose 5%. 1000 milliLiter(s) (100 mL/Hr) IV Continuous <Continuous>  dextrose 5%. 1000 milliLiter(s) (50 mL/Hr) IV Continuous <Continuous>  dextrose 50% Injectable 25 Gram(s) IV Push once  dextrose 50% Injectable 12.5 Gram(s) IV Push once  dextrose 50% Injectable 25 Gram(s) IV Push once  enalapril 20 milliGRAM(s) Oral every 12 hours  ferrous    sulfate 325 milliGRAM(s) Oral <User Schedule>  gabapentin 300 milliGRAM(s) Oral three times a day  glucagon  Injectable 1 milliGRAM(s) IntraMuscular once  hydrALAZINE 50 milliGRAM(s) Oral two times a day  insulin lispro (ADMELOG) corrective regimen sliding scale   SubCutaneous three times a day before meals  insulin lispro (ADMELOG) corrective regimen sliding scale   SubCutaneous at bedtime  insulin lispro Injectable (ADMELOG) 6 Unit(s) SubCutaneous three times a day before meals  metoprolol tartrate 50 milliGRAM(s) Oral every 12 hours  pantoprazole    Tablet 40 milliGRAM(s) Oral before breakfast  simvastatin 40 milliGRAM(s) Oral at bedtime    MEDICATIONS  (PRN):  acetaminophen     Tablet .. 650 milliGRAM(s) Oral every 6 hours PRN Mild Pain (1 - 3)  dextrose Oral Gel 15 Gram(s) Oral once PRN Blood Glucose LESS THAN 70 milliGRAM(s)/deciliter  diclofenac sodium 1% Gel 2 Gram(s) Topical two times a day PRN shoulder pain  oxyCODONE    IR 5 milliGRAM(s) Oral every 6 hours PRN Moderate Pain (4 - 6)  oxyCODONE    IR 10 milliGRAM(s) Oral every 6 hours PRN Severe Pain (7 - 10)      CAPILLARY BLOOD GLUCOSE      POCT Blood Glucose.: 243 mg/dL (09 Apr 2023 21:17)  POCT Blood Glucose.: 149 mg/dL (09 Apr 2023 16:51)  POCT Blood Glucose.: 256 mg/dL (09 Apr 2023 11:45)  POCT Blood Glucose.: 170 mg/dL (09 Apr 2023 08:02)    I&O's Summary    09 Apr 2023 07:01  -  10 Apr 2023 07:00  --------------------------------------------------------  IN: 236 mL / OUT: 0 mL / NET: 236 mL        PHYSICAL EXAM:  Vital Signs Last 24 Hrs  T(C): 36.6 (10 Apr 2023 04:31), Max: 37.2 (09 Apr 2023 20:23)  T(F): 97.8 (10 Apr 2023 04:31), Max: 99 (09 Apr 2023 20:23)  HR: 80 (10 Apr 2023 04:31) (79 - 80)  BP: 179/79 (10 Apr 2023 04:31) (152/69 - 179/79)  BP(mean): --  RR: 18 (10 Apr 2023 04:31) (18 - 19)  SpO2: 95% (10 Apr 2023 04:31) (95% - 98%)    Parameters below as of 10 Apr 2023 04:31  Patient On (Oxygen Delivery Method): room air      GENERAL: NAD  HEAD:  Atraumatic, normocephalic  EYES: EOMI, PERRLA, conjunctiva and sclera clear  NECK: Supple, no JVD  CHEST/LUNG: Mild intermittent wheeze. No rales or rhonchi.  HEART: Regular rate & rhythm. No murmurs or rubs  ABDOMEN: Soft, epigastric ttp, mildly distended; Bowel sounds present  EXTREMITIES:  No LE edema  PSYCH: AAOx3  NEUROLOGY: No focal deficits  SKIN: No rashes or lesions    LABS:                        9.3    9.32  )-----------( 540      ( 10 Apr 2023 06:50 )             31.0     04-09    137  |  101  |  8   ----------------------------<  130<H>  3.9   |  27  |  1.06    Ca    8.5      09 Apr 2023 05:57  Phos  2.2     04-09  Mg     1.6     04-09      PTT - ( 08 Apr 2023 12:38 )  PTT:79.7 sec            RADIOLOGY & ADDITIONAL TESTS:  Results Reviewed:   Imaging Personally Reviewed:  Electrocardiogram Personally Reviewed:    COORDINATION OF CARE:  Care Discussed with Consultants/Other Providers [Y/N]:  Prior or Outpatient Records Reviewed [Y/N]:

## 2023-04-10 NOTE — PROGRESS NOTE ADULT - PROBLEM SELECTOR PLAN 1
Recent viral illness last week? Now with worsening chest pain, concern for pericarditis after recent URI.   - limited TTE on admission shows moderate pericardial effusion with no tamponade physiology  - CRP elevated (286), proBNP elevated (2285). Trops wnl  - s/p  q8h, 4/5-4/6  - c/w colchicine 0.6 q12h  - repeat full TTE 4/6 shows small pericardial effusion, no hemodynamic compromise  - pain control: Tylenol prn, oxy prn for severe pain, topical diclofenac  - cards recs appreciated; no need for urgent drainage per cards given hemodynamic stability Recent viral illness last week? Now with worsening chest pain, concern for pericarditis after recent URI.   - limited TTE on admission shows moderate pericardial effusion with no tamponade physiology  - CRP elevated (286), proBNP elevated (2285). Trops wnl  - s/p  q8h, 4/5-4/6  - c/w colchicine 0.6 q12h  - repeat full TTE 4/6 shows small pericardial effusion, no hemodynamic compromise  - pain control: Tylenol prn, oxy prn for severe pain, topical diclofenac  - cards recs appreciated; no need for urgent drainage per cards given hemodynamic stability  - f/u TTE Recent viral illness last week? Now with worsening chest pain, concern for pericarditis after recent URI.   - limited TTE on admission shows moderate pericardial effusion with no tamponade physiology  - CRP elevated (286), proBNP elevated (2285). Trops wnl  - s/p  q8h, 4/5-4/6  - c/w colchicine 0.6 q12h  - repeat full TTE 4/6 shows small pericardial effusion, no hemodynamic compromise  - pain control: Tylenol prn, oxy prn for severe pain, topical diclofenac  - cards recs appreciated; no need for urgent drainage per cards given hemodynamic stability  -  TTE---> improved pericardial effusion

## 2023-04-10 NOTE — PROGRESS NOTE ADULT - PROBLEM SELECTOR PLAN 4
Baseline 12-13, now hemoglobin 8.6, may be component of chronic disease with MARQUEZ.   - Ferritin 10, sat 6% on recent outpatient labs.   - started ferrous sulfate  - Hgb stable Baseline 12-13, now hemoglobin 8.6, may be component of chronic disease with MARQUEZ.   - Ferritin 10, sat 6% on recent outpatient labs.   -cw ferrous sulfate  - Hgb stable

## 2023-04-10 NOTE — PROGRESS NOTE ADULT - ATTENDING COMMENTS
72F w/ Hx of diabetes, CAD s/p stent (2019), aortic thrombus for which no anticoagulation was felt to be necessary in the past, hypertension, DM2 p/w acute worsening chest pain over 3 days, found to have large pericardial effusion on CT scan and treated for pericarditis.    # Acute Pericarditis     Appreciate cardiology recommendations. POCUS more suggestive for moderate effusion w/o signs of tamponade physiology.    Repeat TTE with improved pericardial effusion     NO pain today    dc high dose asa; continue with colchicine for pericarditis for now and monitor response --> anticipated about 6-12 weeks of treatment    was started on ASA 81mg 4/8 in setting of CAD history     NO clear etiology of the pericarditis --> Possible viral cause of pericarditis and pericardial effusion     f/up repeat TTE and if stable then DC home .    #   New onset afib w/ rvr during admission; repeat ECHO done showing decrease in size of effusion    S/P heparin gtt --> DOAC on 4/8--> stable on Eliquis    Rate is currently controlled     cont with home Metoprolol.     - PT eval ----> oupt PT   MOnitor SPO2   Preapare for DC       Olga Ward   HOspitalist   [phone]   Available on TEAMS . 72F w/ Hx of diabetes, CAD s/p stent (2019), aortic thrombus for which no anticoagulation was felt to be necessary in the past, hypertension, DM2 p/w acute worsening chest pain over 3 days, found to have large pericardial effusion on CT scan and treated for pericarditis.  Patient offered no complaints when seen   # Acute Pericarditis     Appreciate cardiology recommendations. POCUS more suggestive for moderate effusion w/o signs of tamponade physiology.    Repeat TTE with improved pericardial effusion     NO pain today    dc high dose asa; continue with colchicine for pericarditis for now and monitor response --> anticipated about 6-12 weeks of treatment    was started on ASA 81mg 4/8 in setting of CAD history     NO clear etiology of the pericarditis --> Possible viral cause of pericarditis and pericardial effusion     Repeat TTE with stable pericardial effusion   #   New onset afib w/ rvr during admission/ now stable ; repeat ECHO done showing decrease in size of effusion    S/P heparin gtt --> DOAC on 4/8--> stable on Eliquis    Rate is currently controlled     cont with home Metoprolol.     - PT eval ----> oupt PT   MOnitor SPO2   DC home today with home PT ---> DC time 45mns   Pt is to follow up with pcp and monitor BP at home        Olga Ward   HOspitalist   [phone]   Available on TEAMS .

## 2023-04-11 ENCOUNTER — TRANSCRIPTION ENCOUNTER (OUTPATIENT)
Age: 73
End: 2023-04-11

## 2023-04-11 VITALS
RESPIRATION RATE: 18 BRPM | SYSTOLIC BLOOD PRESSURE: 158 MMHG | HEART RATE: 77 BPM | TEMPERATURE: 98 F | DIASTOLIC BLOOD PRESSURE: 67 MMHG | OXYGEN SATURATION: 96 %

## 2023-04-11 LAB
ALBUMIN SERPL ELPH-MCNC: 2.9 G/DL — LOW (ref 3.3–5)
ALP SERPL-CCNC: 175 U/L — HIGH (ref 40–120)
ALT FLD-CCNC: 23 U/L — SIGNIFICANT CHANGE UP (ref 10–45)
ANION GAP SERPL CALC-SCNC: 11 MMOL/L — SIGNIFICANT CHANGE UP (ref 5–17)
AST SERPL-CCNC: 33 U/L — SIGNIFICANT CHANGE UP (ref 10–40)
BASOPHILS # BLD AUTO: 0.03 K/UL — SIGNIFICANT CHANGE UP (ref 0–0.2)
BASOPHILS NFR BLD AUTO: 0.3 % — SIGNIFICANT CHANGE UP (ref 0–2)
BILIRUB SERPL-MCNC: 0.2 MG/DL — SIGNIFICANT CHANGE UP (ref 0.2–1.2)
BUN SERPL-MCNC: 8 MG/DL — SIGNIFICANT CHANGE UP (ref 7–23)
CALCIUM SERPL-MCNC: 8.6 MG/DL — SIGNIFICANT CHANGE UP (ref 8.4–10.5)
CHLORIDE SERPL-SCNC: 101 MMOL/L — SIGNIFICANT CHANGE UP (ref 96–108)
CO2 SERPL-SCNC: 28 MMOL/L — SIGNIFICANT CHANGE UP (ref 22–31)
CREAT SERPL-MCNC: 1.07 MG/DL — SIGNIFICANT CHANGE UP (ref 0.5–1.3)
EGFR: 55 ML/MIN/1.73M2 — LOW
EOSINOPHIL # BLD AUTO: 0.39 K/UL — SIGNIFICANT CHANGE UP (ref 0–0.5)
EOSINOPHIL NFR BLD AUTO: 3.7 % — SIGNIFICANT CHANGE UP (ref 0–6)
GAMMA INTERFERON BACKGROUND BLD IA-ACNC: 0.02 IU/ML — SIGNIFICANT CHANGE UP
GLUCOSE BLDC GLUCOMTR-MCNC: 143 MG/DL — HIGH (ref 70–99)
GLUCOSE BLDC GLUCOMTR-MCNC: 247 MG/DL — HIGH (ref 70–99)
GLUCOSE SERPL-MCNC: 128 MG/DL — HIGH (ref 70–99)
HCT VFR BLD CALC: 31.3 % — LOW (ref 34.5–45)
HGB BLD-MCNC: 9.5 G/DL — LOW (ref 11.5–15.5)
IMM GRANULOCYTES NFR BLD AUTO: 1.1 % — HIGH (ref 0–0.9)
LYMPHOCYTES # BLD AUTO: 1.9 K/UL — SIGNIFICANT CHANGE UP (ref 1–3.3)
LYMPHOCYTES # BLD AUTO: 18.1 % — SIGNIFICANT CHANGE UP (ref 13–44)
M TB IFN-G BLD-IMP: NEGATIVE — SIGNIFICANT CHANGE UP
M TB IFN-G CD4+ BCKGRND COR BLD-ACNC: -0.01 IU/ML — SIGNIFICANT CHANGE UP
M TB IFN-G CD4+CD8+ BCKGRND COR BLD-ACNC: 0 IU/ML — SIGNIFICANT CHANGE UP
MAGNESIUM SERPL-MCNC: 1.5 MG/DL — LOW (ref 1.6–2.6)
MCHC RBC-ENTMCNC: 24.1 PG — LOW (ref 27–34)
MCHC RBC-ENTMCNC: 30.4 GM/DL — LOW (ref 32–36)
MCV RBC AUTO: 79.2 FL — LOW (ref 80–100)
MONOCYTES # BLD AUTO: 0.92 K/UL — HIGH (ref 0–0.9)
MONOCYTES NFR BLD AUTO: 8.8 % — SIGNIFICANT CHANGE UP (ref 2–14)
NEUTROPHILS # BLD AUTO: 7.15 K/UL — SIGNIFICANT CHANGE UP (ref 1.8–7.4)
NEUTROPHILS NFR BLD AUTO: 68 % — SIGNIFICANT CHANGE UP (ref 43–77)
NRBC # BLD: 0 /100 WBCS — SIGNIFICANT CHANGE UP (ref 0–0)
PHOSPHATE SERPL-MCNC: 2.9 MG/DL — SIGNIFICANT CHANGE UP (ref 2.5–4.5)
PLATELET # BLD AUTO: 526 K/UL — HIGH (ref 150–400)
POTASSIUM SERPL-MCNC: 3.5 MMOL/L — SIGNIFICANT CHANGE UP (ref 3.5–5.3)
POTASSIUM SERPL-SCNC: 3.5 MMOL/L — SIGNIFICANT CHANGE UP (ref 3.5–5.3)
PROT SERPL-MCNC: 6.8 G/DL — SIGNIFICANT CHANGE UP (ref 6–8.3)
QUANT TB PLUS MITOGEN MINUS NIL: 1.19 IU/ML — SIGNIFICANT CHANGE UP
RBC # BLD: 3.95 M/UL — SIGNIFICANT CHANGE UP (ref 3.8–5.2)
RBC # FLD: 17.2 % — HIGH (ref 10.3–14.5)
SODIUM SERPL-SCNC: 140 MMOL/L — SIGNIFICANT CHANGE UP (ref 135–145)
WBC # BLD: 10.51 K/UL — HIGH (ref 3.8–10.5)
WBC # FLD AUTO: 10.51 K/UL — HIGH (ref 3.8–10.5)

## 2023-04-11 PROCEDURE — 83605 ASSAY OF LACTIC ACID: CPT

## 2023-04-11 PROCEDURE — 86901 BLOOD TYPING SEROLOGIC RH(D): CPT

## 2023-04-11 PROCEDURE — 93005 ELECTROCARDIOGRAM TRACING: CPT

## 2023-04-11 PROCEDURE — 93321 DOPPLER ECHO F-UP/LMTD STD: CPT

## 2023-04-11 PROCEDURE — 99285 EMERGENCY DEPT VISIT HI MDM: CPT | Mod: 25

## 2023-04-11 PROCEDURE — 83880 ASSAY OF NATRIURETIC PEPTIDE: CPT

## 2023-04-11 PROCEDURE — 82570 ASSAY OF URINE CREATININE: CPT

## 2023-04-11 PROCEDURE — 85025 COMPLETE CBC W/AUTO DIFF WBC: CPT

## 2023-04-11 PROCEDURE — 83550 IRON BINDING TEST: CPT

## 2023-04-11 PROCEDURE — 84540 ASSAY OF URINE/UREA-N: CPT

## 2023-04-11 PROCEDURE — 36415 COLL VENOUS BLD VENIPUNCTURE: CPT

## 2023-04-11 PROCEDURE — 82435 ASSAY OF BLOOD CHLORIDE: CPT

## 2023-04-11 PROCEDURE — 82947 ASSAY GLUCOSE BLOOD QUANT: CPT

## 2023-04-11 PROCEDURE — 84133 ASSAY OF URINE POTASSIUM: CPT

## 2023-04-11 PROCEDURE — 71045 X-RAY EXAM CHEST 1 VIEW: CPT

## 2023-04-11 PROCEDURE — 86803 HEPATITIS C AB TEST: CPT

## 2023-04-11 PROCEDURE — U0005: CPT

## 2023-04-11 PROCEDURE — 81001 URINALYSIS AUTO W/SCOPE: CPT

## 2023-04-11 PROCEDURE — 86140 C-REACTIVE PROTEIN: CPT

## 2023-04-11 PROCEDURE — 85652 RBC SED RATE AUTOMATED: CPT

## 2023-04-11 PROCEDURE — 0225U NFCT DS DNA&RNA 21 SARSCOV2: CPT

## 2023-04-11 PROCEDURE — 84300 ASSAY OF URINE SODIUM: CPT

## 2023-04-11 PROCEDURE — 82803 BLOOD GASES ANY COMBINATION: CPT

## 2023-04-11 PROCEDURE — 97161 PT EVAL LOW COMPLEX 20 MIN: CPT

## 2023-04-11 PROCEDURE — 83930 ASSAY OF BLOOD OSMOLALITY: CPT

## 2023-04-11 PROCEDURE — 85730 THROMBOPLASTIN TIME PARTIAL: CPT

## 2023-04-11 PROCEDURE — 86900 BLOOD TYPING SEROLOGIC ABO: CPT

## 2023-04-11 PROCEDURE — 86480 TB TEST CELL IMMUN MEASURE: CPT

## 2023-04-11 PROCEDURE — 94640 AIRWAY INHALATION TREATMENT: CPT

## 2023-04-11 PROCEDURE — U0003: CPT

## 2023-04-11 PROCEDURE — 80048 BASIC METABOLIC PNL TOTAL CA: CPT

## 2023-04-11 PROCEDURE — 85014 HEMATOCRIT: CPT

## 2023-04-11 PROCEDURE — 84443 ASSAY THYROID STIM HORMONE: CPT

## 2023-04-11 PROCEDURE — 84100 ASSAY OF PHOSPHORUS: CPT

## 2023-04-11 PROCEDURE — 86850 RBC ANTIBODY SCREEN: CPT

## 2023-04-11 PROCEDURE — 83036 HEMOGLOBIN GLYCOSYLATED A1C: CPT

## 2023-04-11 PROCEDURE — 83735 ASSAY OF MAGNESIUM: CPT

## 2023-04-11 PROCEDURE — 86038 ANTINUCLEAR ANTIBODIES: CPT

## 2023-04-11 PROCEDURE — 99239 HOSP IP/OBS DSCHRG MGMT >30: CPT | Mod: GC

## 2023-04-11 PROCEDURE — 84132 ASSAY OF SERUM POTASSIUM: CPT

## 2023-04-11 PROCEDURE — 84295 ASSAY OF SERUM SODIUM: CPT

## 2023-04-11 PROCEDURE — 71275 CT ANGIOGRAPHY CHEST: CPT | Mod: MA

## 2023-04-11 PROCEDURE — 85018 HEMOGLOBIN: CPT

## 2023-04-11 PROCEDURE — 85027 COMPLETE CBC AUTOMATED: CPT

## 2023-04-11 PROCEDURE — 83935 ASSAY OF URINE OSMOLALITY: CPT

## 2023-04-11 PROCEDURE — 85610 PROTHROMBIN TIME: CPT

## 2023-04-11 PROCEDURE — 82728 ASSAY OF FERRITIN: CPT

## 2023-04-11 PROCEDURE — 93306 TTE W/DOPPLER COMPLETE: CPT

## 2023-04-11 PROCEDURE — 84484 ASSAY OF TROPONIN QUANT: CPT

## 2023-04-11 PROCEDURE — 93308 TTE F-UP OR LMTD: CPT

## 2023-04-11 PROCEDURE — 82962 GLUCOSE BLOOD TEST: CPT

## 2023-04-11 PROCEDURE — 82330 ASSAY OF CALCIUM: CPT

## 2023-04-11 PROCEDURE — 84156 ASSAY OF PROTEIN URINE: CPT

## 2023-04-11 PROCEDURE — 83540 ASSAY OF IRON: CPT

## 2023-04-11 PROCEDURE — 80053 COMPREHEN METABOLIC PANEL: CPT

## 2023-04-11 RX ORDER — METFORMIN HYDROCHLORIDE 850 MG/1
2 TABLET ORAL
Qty: 30 | Refills: 3
Start: 2023-04-11

## 2023-04-11 RX ORDER — METFORMIN HYDROCHLORIDE 850 MG/1
1 TABLET ORAL
Qty: 30 | Refills: 3
Start: 2023-04-11

## 2023-04-11 RX ORDER — COLCHICINE 0.6 MG
1 TABLET ORAL
Qty: 30 | Refills: 3
Start: 2023-04-11 | End: 2024-04-04

## 2023-04-11 RX ORDER — MAGNESIUM OXIDE 400 MG ORAL TABLET 241.3 MG
400 TABLET ORAL EVERY 4 HOURS
Refills: 0 | Status: COMPLETED | OUTPATIENT
Start: 2023-04-11 | End: 2023-04-11

## 2023-04-11 RX ORDER — MAGNESIUM SULFATE 500 MG/ML
2 VIAL (ML) INJECTION ONCE
Refills: 0 | Status: DISCONTINUED | OUTPATIENT
Start: 2023-04-11 | End: 2023-04-11

## 2023-04-11 RX ORDER — COLCHICINE 0.6 MG
1 TABLET ORAL
Qty: 180 | Refills: 0
Start: 2023-04-11 | End: 2023-07-09

## 2023-04-11 RX ORDER — COLCHICINE 0.6 MG
1 TABLET ORAL
Qty: 30 | Refills: 3 | DISCHARGE
Start: 2023-04-11 | End: 2024-04-04

## 2023-04-11 RX ORDER — INSULIN LISPRO 100/ML
6 VIAL (ML) SUBCUTANEOUS ONCE
Refills: 0 | Status: COMPLETED | OUTPATIENT
Start: 2023-04-11 | End: 2023-04-11

## 2023-04-11 RX ORDER — INSULIN GLARGINE 100 [IU]/ML
2 INJECTION, SOLUTION SUBCUTANEOUS AT BEDTIME
Refills: 0 | Status: DISCONTINUED | OUTPATIENT
Start: 2023-04-11 | End: 2023-04-11

## 2023-04-11 RX ORDER — HYDRALAZINE HCL 50 MG
75 TABLET ORAL THREE TIMES A DAY
Refills: 0 | Status: DISCONTINUED | OUTPATIENT
Start: 2023-04-11 | End: 2023-04-11

## 2023-04-11 RX ORDER — INSULIN LISPRO 100/ML
6 VIAL (ML) SUBCUTANEOUS
Refills: 0 | Status: DISCONTINUED | OUTPATIENT
Start: 2023-04-11 | End: 2023-04-11

## 2023-04-11 RX ADMIN — Medication 20 MILLIGRAM(S): at 05:20

## 2023-04-11 RX ADMIN — MAGNESIUM OXIDE 400 MG ORAL TABLET 400 MILLIGRAM(S): 241.3 TABLET ORAL at 08:01

## 2023-04-11 RX ADMIN — Medication 75 MILLIGRAM(S): at 13:02

## 2023-04-11 RX ADMIN — Medication 50 MILLIGRAM(S): at 05:21

## 2023-04-11 RX ADMIN — Medication 4: at 12:15

## 2023-04-11 RX ADMIN — Medication 6 UNIT(S): at 12:15

## 2023-04-11 RX ADMIN — GABAPENTIN 300 MILLIGRAM(S): 400 CAPSULE ORAL at 05:20

## 2023-04-11 RX ADMIN — Medication 0.6 MILLIGRAM(S): at 05:21

## 2023-04-11 RX ADMIN — PANTOPRAZOLE SODIUM 40 MILLIGRAM(S): 20 TABLET, DELAYED RELEASE ORAL at 05:23

## 2023-04-11 RX ADMIN — POLYETHYLENE GLYCOL 3350 17 GRAM(S): 17 POWDER, FOR SOLUTION ORAL at 12:16

## 2023-04-11 RX ADMIN — Medication 50 MILLIGRAM(S): at 05:20

## 2023-04-11 RX ADMIN — Medication 81 MILLIGRAM(S): at 12:15

## 2023-04-11 RX ADMIN — GABAPENTIN 300 MILLIGRAM(S): 400 CAPSULE ORAL at 13:02

## 2023-04-11 RX ADMIN — MAGNESIUM OXIDE 400 MG ORAL TABLET 400 MILLIGRAM(S): 241.3 TABLET ORAL at 12:15

## 2023-04-11 RX ADMIN — Medication 6 UNIT(S): at 08:28

## 2023-04-11 RX ADMIN — APIXABAN 5 MILLIGRAM(S): 2.5 TABLET, FILM COATED ORAL at 05:21

## 2023-04-11 RX ADMIN — Medication 0.1 MILLIGRAM(S): at 05:20

## 2023-04-11 NOTE — PROGRESS NOTE ADULT - PROBLEM SELECTOR PROBLEM 8
HTN (hypertension)
HTN (hypertension)
Prophylactic measure

## 2023-04-11 NOTE — PROGRESS NOTE ADULT - PROBLEM SELECTOR PROBLEM 3
FARSHAD (acute kidney injury)

## 2023-04-11 NOTE — DISCHARGE NOTE NURSING/CASE MANAGEMENT/SOCIAL WORK - PATIENT PORTAL LINK FT
You can access the FollowMyHealth Patient Portal offered by [facility] by registering at the following website: [email or web address]. By joining [facility]’s FollowMyHealth portal, you will also be able to view your health information using other applications (apps) compatible with our system.

## 2023-04-11 NOTE — PROGRESS NOTE ADULT - PROVIDER SPECIALTY LIST ADULT
Internal Medicine
Cardiology
Internal Medicine

## 2023-04-11 NOTE — PROGRESS NOTE ADULT - SUBJECTIVE AND OBJECTIVE BOX
***************************************************************  Clarissa Do MD (PGY-1)  Internal Medicine  Pager: [phone]  ***************************************************************    PROGRESS NOTE:     Patient is a 72y old  Female who presents with a chief complaint of Chest pain (10 Apr 2023 07:10)      SUBJECTIVE / OVERNIGHT EVENTS: Patient seen and examined at bedside. No acute overnight events.     MEDICATIONS  (STANDING):  apixaban 5 milliGRAM(s) Oral every 12 hours  aspirin  chewable 81 milliGRAM(s) Oral daily  atorvastatin 20 milliGRAM(s) Oral at bedtime  cloNIDine 0.1 milliGRAM(s) Oral two times a day  colchicine 0.6 milliGRAM(s) Oral every 12 hours  dextrose 5%. 1000 milliLiter(s) (50 mL/Hr) IV Continuous <Continuous>  dextrose 5%. 1000 milliLiter(s) (100 mL/Hr) IV Continuous <Continuous>  dextrose 50% Injectable 25 Gram(s) IV Push once  dextrose 50% Injectable 12.5 Gram(s) IV Push once  dextrose 50% Injectable 25 Gram(s) IV Push once  enalapril 20 milliGRAM(s) Oral every 12 hours  ferrous    sulfate 325 milliGRAM(s) Oral <User Schedule>  gabapentin 300 milliGRAM(s) Oral three times a day  glucagon  Injectable 1 milliGRAM(s) IntraMuscular once  hydrALAZINE 50 milliGRAM(s) Oral three times a day  insulin glargine Injectable (LANTUS) 2 Unit(s) SubCutaneous at bedtime  insulin lispro (ADMELOG) corrective regimen sliding scale   SubCutaneous three times a day before meals  insulin lispro (ADMELOG) corrective regimen sliding scale   SubCutaneous at bedtime  insulin lispro Injectable (ADMELOG) 6 Unit(s) SubCutaneous three times a day before meals  magnesium oxide 400 milliGRAM(s) Oral every 4 hours  metoprolol tartrate 50 milliGRAM(s) Oral every 12 hours  pantoprazole    Tablet 40 milliGRAM(s) Oral before breakfast  polyethylene glycol 3350 17 Gram(s) Oral daily  senna 2 Tablet(s) Oral at bedtime    MEDICATIONS  (PRN):  acetaminophen     Tablet .. 650 milliGRAM(s) Oral every 6 hours PRN Mild Pain (1 - 3)  dextrose Oral Gel 15 Gram(s) Oral once PRN Blood Glucose LESS THAN 70 milliGRAM(s)/deciliter  diclofenac sodium 1% Gel 2 Gram(s) Topical two times a day PRN shoulder pain  famotidine    Tablet 20 milliGRAM(s) Oral daily PRN POST MEAL EPIGASTRIC PAIN  oxyCODONE    IR 5 milliGRAM(s) Oral every 6 hours PRN Moderate Pain (4 - 6)  oxyCODONE    IR 10 milliGRAM(s) Oral every 6 hours PRN Severe Pain (7 - 10)      CAPILLARY BLOOD GLUCOSE      POCT Blood Glucose.: 126 mg/dL (10 Apr 2023 22:16)  POCT Blood Glucose.: 87 mg/dL (10 Apr 2023 21:49)  POCT Blood Glucose.: 77 mg/dL (10 Apr 2023 21:25)  POCT Blood Glucose.: 129 mg/dL (10 Apr 2023 16:59)  POCT Blood Glucose.: 254 mg/dL (10 Apr 2023 11:42)    I&O's Summary    10 Apr 2023 07:01  -  11 Apr 2023 07:00  --------------------------------------------------------  IN: 350 mL / OUT: 0 mL / NET: 350 mL        PHYSICAL EXAM:  Vital Signs Last 24 Hrs  T(C): 36.8 (11 Apr 2023 04:16), Max: 37.1 (10 Apr 2023 20:48)  T(F): 98.3 (11 Apr 2023 04:16), Max: 98.8 (10 Apr 2023 20:48)  HR: 82 (11 Apr 2023 04:16) (70 - 82)  BP: 176/73 (11 Apr 2023 04:16) (147/75 - 181/83)  BP(mean): --  RR: 18 (11 Apr 2023 04:16) (16 - 18)  SpO2: 95% (11 Apr 2023 04:16) (95% - 96%)    Parameters below as of 11 Apr 2023 07:28  Patient On (Oxygen Delivery Method): room air      GENERAL: NAD  HEAD:  Atraumatic, normocephalic  EYES: EOMI, PERRLA, conjunctiva and sclera clear  NECK: Supple, no JVD  CHEST/LUNG: Mild intermittent wheeze. No rales or rhonchi.  HEART: Regular rate & rhythm. No murmurs or rubs  ABDOMEN: Soft, epigastric ttp, mildly distended; Bowel sounds present  EXTREMITIES:  No LE edema  PSYCH: AAOx3  NEUROLOGY: No focal deficits  SKIN: No rashes or lesions    LABS:                        9.5    10.51 )-----------( 526      ( 11 Apr 2023 06:24 )             31.3     04-11    140  |  101  |  8   ----------------------------<  128<H>  3.5   |  28  |  1.07    Ca    8.6      11 Apr 2023 06:24  Phos  2.9     04-11  Mg     1.5     04-11    TPro  6.8  /  Alb  2.9<L>  /  TBili  0.2  /  DBili  x   /  AST  33  /  ALT  23  /  AlkPhos  175<H>  04-11                RADIOLOGY & ADDITIONAL TESTS:  Results Reviewed:   Imaging Personally Reviewed:  Electrocardiogram Personally Reviewed:    COORDINATION OF CARE:  Care Discussed with Consultants/Other Providers [Y/N]:  Prior or Outpatient Records Reviewed [Y/N]:   ***************************************************************  Clarissa Do MD (PGY-1)  Internal Medicine  Pager: [phone]  ***************************************************************    PROGRESS NOTE:     Patient is a 72y old  Female who presents with a chief complaint of Chest pain (10 Apr 2023 07:10)      SUBJECTIVE / OVERNIGHT EVENTS: Patient seen and examined at bedside. No acute overnight events. Patient denies CP, SOB, and HA.    MEDICATIONS  (STANDING):  apixaban 5 milliGRAM(s) Oral every 12 hours  aspirin  chewable 81 milliGRAM(s) Oral daily  atorvastatin 20 milliGRAM(s) Oral at bedtime  cloNIDine 0.1 milliGRAM(s) Oral two times a day  colchicine 0.6 milliGRAM(s) Oral every 12 hours  dextrose 5%. 1000 milliLiter(s) (50 mL/Hr) IV Continuous <Continuous>  dextrose 5%. 1000 milliLiter(s) (100 mL/Hr) IV Continuous <Continuous>  dextrose 50% Injectable 25 Gram(s) IV Push once  dextrose 50% Injectable 12.5 Gram(s) IV Push once  dextrose 50% Injectable 25 Gram(s) IV Push once  enalapril 20 milliGRAM(s) Oral every 12 hours  ferrous    sulfate 325 milliGRAM(s) Oral <User Schedule>  gabapentin 300 milliGRAM(s) Oral three times a day  glucagon  Injectable 1 milliGRAM(s) IntraMuscular once  hydrALAZINE 50 milliGRAM(s) Oral three times a day  insulin glargine Injectable (LANTUS) 2 Unit(s) SubCutaneous at bedtime  insulin lispro (ADMELOG) corrective regimen sliding scale   SubCutaneous three times a day before meals  insulin lispro (ADMELOG) corrective regimen sliding scale   SubCutaneous at bedtime  insulin lispro Injectable (ADMELOG) 6 Unit(s) SubCutaneous three times a day before meals  magnesium oxide 400 milliGRAM(s) Oral every 4 hours  metoprolol tartrate 50 milliGRAM(s) Oral every 12 hours  pantoprazole    Tablet 40 milliGRAM(s) Oral before breakfast  polyethylene glycol 3350 17 Gram(s) Oral daily  senna 2 Tablet(s) Oral at bedtime    MEDICATIONS  (PRN):  acetaminophen     Tablet .. 650 milliGRAM(s) Oral every 6 hours PRN Mild Pain (1 - 3)  dextrose Oral Gel 15 Gram(s) Oral once PRN Blood Glucose LESS THAN 70 milliGRAM(s)/deciliter  diclofenac sodium 1% Gel 2 Gram(s) Topical two times a day PRN shoulder pain  famotidine    Tablet 20 milliGRAM(s) Oral daily PRN POST MEAL EPIGASTRIC PAIN  oxyCODONE    IR 5 milliGRAM(s) Oral every 6 hours PRN Moderate Pain (4 - 6)  oxyCODONE    IR 10 milliGRAM(s) Oral every 6 hours PRN Severe Pain (7 - 10)      CAPILLARY BLOOD GLUCOSE      POCT Blood Glucose.: 126 mg/dL (10 Apr 2023 22:16)  POCT Blood Glucose.: 87 mg/dL (10 Apr 2023 21:49)  POCT Blood Glucose.: 77 mg/dL (10 Apr 2023 21:25)  POCT Blood Glucose.: 129 mg/dL (10 Apr 2023 16:59)  POCT Blood Glucose.: 254 mg/dL (10 Apr 2023 11:42)    I&O's Summary    10 Apr 2023 07:01  -  11 Apr 2023 07:00  --------------------------------------------------------  IN: 350 mL / OUT: 0 mL / NET: 350 mL        PHYSICAL EXAM:  Vital Signs Last 24 Hrs  T(C): 36.8 (11 Apr 2023 04:16), Max: 37.1 (10 Apr 2023 20:48)  T(F): 98.3 (11 Apr 2023 04:16), Max: 98.8 (10 Apr 2023 20:48)  HR: 82 (11 Apr 2023 04:16) (70 - 82)  BP: 176/73 (11 Apr 2023 04:16) (147/75 - 181/83)  BP(mean): --  RR: 18 (11 Apr 2023 04:16) (16 - 18)  SpO2: 95% (11 Apr 2023 04:16) (95% - 96%)    Parameters below as of 11 Apr 2023 07:28  Patient On (Oxygen Delivery Method): room air      GENERAL: NAD  HEAD:  Atraumatic, normocephalic  EYES: EOMI, PERRLA, conjunctiva and sclera clear  NECK: Supple, no JVD  CHEST/LUNG: Mild intermittent wheeze. No rales or rhonchi.  HEART: Regular rate & rhythm. No murmurs or rubs  ABDOMEN: Soft, epigastric ttp, mildly distended; Bowel sounds present  EXTREMITIES:  No LE edema  PSYCH: AAOx3  NEUROLOGY: No focal deficits  SKIN: No rashes or lesions    LABS:                        9.5    10.51 )-----------( 526      ( 11 Apr 2023 06:24 )             31.3     04-11    140  |  101  |  8   ----------------------------<  128<H>  3.5   |  28  |  1.07    Ca    8.6      11 Apr 2023 06:24  Phos  2.9     04-11  Mg     1.5     04-11    TPro  6.8  /  Alb  2.9<L>  /  TBili  0.2  /  DBili  x   /  AST  33  /  ALT  23  /  AlkPhos  175<H>  04-11                RADIOLOGY & ADDITIONAL TESTS:  Results Reviewed:   Imaging Personally Reviewed:  Electrocardiogram Personally Reviewed:    COORDINATION OF CARE:  Care Discussed with Consultants/Other Providers [Y/N]:  Prior or Outpatient Records Reviewed [Y/N]:

## 2023-04-11 NOTE — PROGRESS NOTE ADULT - PROBLEM SELECTOR PLAN 1
Recent viral illness last week? Now with worsening chest pain, concern for pericarditis after recent URI.   - limited TTE on admission shows moderate pericardial effusion with no tamponade physiology  - CRP elevated (286), proBNP elevated (2285). Trops wnl  - s/p  q8h, 4/5-4/6  - c/w colchicine 0.6 q12h  - repeat full TTE 4/6 shows small pericardial effusion, no hemodynamic compromise  - pain control: Tylenol prn, oxy prn for severe pain, topical diclofenac  - cards recs appreciated; no need for urgent drainage per cards given hemodynamic stability  - f/u TTE Recent viral illness last week? Now with worsening chest pain, concern for pericarditis after recent URI.   - limited TTE on admission shows moderate pericardial effusion with no tamponade physiology  - CRP elevated (286), proBNP elevated (2285). Trops wnl  - s/p  q8h, 4/5-4/6  - c/w colchicine 0.6 q12h  - repeat full TTE 4/6 shows small pericardial effusion, no hemodynamic compromise  - pain control: Tylenol prn, oxy prn for severe pain, topical diclofenac  - cards recs appreciated; no need for urgent drainage per cards given hemodynamic stability  - repeat TTE 4/10 trace pericardial effusion

## 2023-04-11 NOTE — PROGRESS NOTE ADULT - PROBLEM SELECTOR PROBLEM 5
Hyponatremia
CAD (coronary artery disease)
Hyponatremia

## 2023-04-11 NOTE — PROGRESS NOTE ADULT - PROBLEM SELECTOR PROBLEM 7
T2DM (type 2 diabetes mellitus)
HTN (hypertension)
T2DM (type 2 diabetes mellitus)

## 2023-04-11 NOTE — PROGRESS NOTE ADULT - ASSESSMENT
72F hx DM, HTN, CAD s/p stent (2019), admitted with worsening pleuritic CP x3 days, fevers x1 week, found to have large pericardial effusion on CT chest, no evidence tamponade on limited echo. Started on ASA/Colchicine for suspected pericarditis with course c/b new onset AFib RVR. 72F hx DM, HTN, CAD s/p stent (2019), admitted with worsening pleuritic CP x3 days, fevers x1 week, found to have large pericardial effusion on CT chest started on ASA/Colchicine for suspected pericarditis with course c/b new onset AFib RVR.

## 2023-04-11 NOTE — PROGRESS NOTE ADULT - PROBLEM SELECTOR PLAN 7
Pt on enalapril 20 bid, clonidine 0.1 bid, hydral 50 bid at home  - BPs have been stably elevated (max systolics to 180s)  - inc hydral 50 tid 4/10  - restarted clonidine 0.1 bid 4/8  - restart enalapril 20mg BID 4/9 Pt on enalapril 20 bid, clonidine 0.1 bid, hydral 50 bid at home  - BPs have been stably elevated (max systolics to 180s)  - inc hydral 75 tid 4/11  - restarted clonidine 0.1 bid 4/8  - restart enalapril 20mg BID 4/9

## 2023-04-11 NOTE — DISCHARGE NOTE NURSING/CASE MANAGEMENT/SOCIAL WORK - NSDCPEFALRISK_GEN_ALL_CORE
related to increased physiological demand for healing
For information on Fall & Injury Prevention, visit: [email or web address] OR  [email or web address] OR  https://www.cdc.gov/steadi/patient.html

## 2023-04-11 NOTE — PROGRESS NOTE ADULT - PROBLEM SELECTOR PROBLEM 6
CAD (coronary artery disease)
T2DM (type 2 diabetes mellitus)
CAD (coronary artery disease)

## 2023-04-12 ENCOUNTER — NON-APPOINTMENT (OUTPATIENT)
Age: 73
End: 2023-04-12

## 2023-04-13 ENCOUNTER — APPOINTMENT (OUTPATIENT)
Dept: INTERNAL MEDICINE | Facility: CLINIC | Age: 73
End: 2023-04-13
Payer: MEDICARE

## 2023-04-13 VITALS
DIASTOLIC BLOOD PRESSURE: 60 MMHG | HEART RATE: 88 BPM | OXYGEN SATURATION: 96 % | HEIGHT: 60 IN | SYSTOLIC BLOOD PRESSURE: 140 MMHG | WEIGHT: 131 LBS | BODY MASS INDEX: 25.72 KG/M2

## 2023-04-13 DIAGNOSIS — I48.0 PAROXYSMAL ATRIAL FIBRILLATION: ICD-10-CM

## 2023-04-13 DIAGNOSIS — Z09 ENCOUNTER FOR FOLLOW-UP EXAMINATION AFTER COMPLETED TREATMENT FOR CONDITIONS OTHER THAN MALIGNANT NEOPLASM: ICD-10-CM

## 2023-04-13 DIAGNOSIS — I31.9 DISEASE OF PERICARDIUM, UNSPECIFIED: ICD-10-CM

## 2023-04-13 DIAGNOSIS — I74.10 EMBOLISM AND THROMBOSIS OF UNSPECIFIED PARTS OF AORTA: ICD-10-CM

## 2023-04-13 LAB — HBA1C MFR BLD HPLC: 8.2

## 2023-04-13 PROCEDURE — 99496 TRANSJ CARE MGMT HIGH F2F 7D: CPT | Mod: 25

## 2023-04-13 PROCEDURE — 83036 HEMOGLOBIN GLYCOSYLATED A1C: CPT | Mod: QW

## 2023-04-13 RX ORDER — COLCHICINE 0.6 MG/1
0.6 CAPSULE ORAL
Refills: 0 | Status: DISCONTINUED | COMMUNITY
End: 2023-04-13

## 2023-04-13 RX ORDER — METFORMIN ER 500 MG 500 MG/1
500 TABLET ORAL
Qty: 360 | Refills: 1 | Status: ACTIVE | COMMUNITY
Start: 2020-12-31

## 2023-04-13 RX ORDER — CLOPIDOGREL BISULFATE 75 MG/1
75 TABLET, FILM COATED ORAL
Qty: 90 | Refills: 3 | Status: DISCONTINUED | COMMUNITY
Start: 2019-11-05 | End: 2023-04-13

## 2023-04-13 RX ORDER — ATORVASTATIN CALCIUM 20 MG/1
20 TABLET, FILM COATED ORAL
Refills: 0 | Status: ACTIVE | COMMUNITY

## 2023-04-13 NOTE — ASSESSMENT
[FreeTextEntry1] : 72-year-old female with a complicated past medical history including diabetes, aortic thrombus, mild anemia with no evidence of bleeding on EGD and colonoscopy here after being discharged for pericarditis.  The patient had serial TTE's that showed reduction in the pericardial effusion as she was started on high-dose aspirin and colchicine.  She was discharged home on her low-dose aspirin daily.  Eliquis was started for episodes of PAF with a rapid ventricular response but it is unclear if she is taking it and she will check at home.  She will call me back to let me know if there are any issues.  We went over her medication list in great detail to make sure she understands why she is taking her medications.  I provided her with a list of her current medications and their indications.  On the medication list I placed the colchicine and said that it will stop after 3 months.  She asked about the duration of Eliquis and I told her that we will really be up to cardiology.  She does have a history of an aortic thrombus but at that time that was found because of its location they did not feel she needed to be anticoagulated.  I did want to do blood work on her today but she asked if we could defer it which we did.  I instructed her to follow-up with one of the physicians in our office in 2 weeks as I am leaving the practice in 1 week.\par \par The patient is also moving to [place] in the beginning of June.  I gave her information regarding the [facility] health system as this is attached to the medical school in [place] and would be a good place for her to get her care.  They do take [facility] so there should be no issues and then excepting her insurance.  Discussed that she needs to call [facility] to update her new home address so that all of her medications will be sent there.\par \par Finally, she has yet to schedule an appointment with cardiology.  I advised her to do so as soon as possible.  I told her they will determine how long she needs to stay on the Eliquis.

## 2023-04-13 NOTE — PHYSICAL EXAM
[No Acute Distress] : no acute distress [Well Nourished] : well nourished [Well Developed] : well developed [Well-Appearing] : well-appearing [No JVD] : no jugular venous distention [Supple] : supple [No Respiratory Distress] : no respiratory distress  [Clear to Auscultation] : lungs were clear to auscultation bilaterally [Normal Rate] : normal rate  [Regular Rhythm] : with a regular rhythm [Normal S1, S2] : normal S1 and S2 [No Murmur] : no murmur heard [No Edema] : there was no peripheral edema [Soft] : abdomen soft [Non Tender] : non-tender [Non-distended] : non-distended [No Masses] : no abdominal mass palpated [No HSM] : no HSM [Normal Bowel Sounds] : normal bowel sounds

## 2023-04-13 NOTE — REVIEW OF SYSTEMS
[Negative] : Respiratory [FreeTextEntry5] : Chest pain has resolved. [FreeTextEntry6] : Shortness of breath has resolved.

## 2023-04-13 NOTE — HISTORY OF PRESENT ILLNESS
[Patient Contacted By: ____] : and contacted by [unfilled] [Post-hospitalization from ___ Hospital] : Post-hospitalization from [unfilled] Hospital [Admitted on: ___] : The patient was admitted on [unfilled] [Discharged on ___] : discharged on [unfilled] [Discharge Summary] : discharge summary [Discharge Med List] : discharge medication list [Other: ____] : [unfilled] [FreeTextEntry2] : Patient was seen here in the office on the day of admission and found to be in respiratory distress with pleuritic chest pain and fever.  She had also syncopized.  She was referred to the ED via ambulance and on presentation a CTA was done which showed a large pericardial effusion.  This was felt to be due to a viral source and she was started on high-dose aspirin and colchicine.  She had serial TTE's that demonstrated a reduction in the size of the pericardial effusion with no tamponade.  TTE done the day before discharge showed a small pericardial effusion and she was placed back onto her regular dose of aspirin.  Hospital course was complicated by paroxysmal atrial fibrillation likely related to the pericarditis.  She was started on Eliquis but seemed unclear as whether she was taking this at home.  She needs to check it.  She did not realize this was different from the Plavix which was stopped.\par \par She tells me that she will be moving to [place] in the [place] area at the beginning of June.

## 2023-04-23 ENCOUNTER — INPATIENT (INPATIENT)
Facility: HOSPITAL | Age: 73
LOS: 4 days | Discharge: HOME | DRG: 383 | End: 2023-04-28
Attending: STUDENT IN AN ORGANIZED HEALTH CARE EDUCATION/TRAINING PROGRAM | Admitting: STUDENT IN AN ORGANIZED HEALTH CARE EDUCATION/TRAINING PROGRAM
Payer: MEDICARE

## 2023-04-23 VITALS
SYSTOLIC BLOOD PRESSURE: 174 MMHG | DIASTOLIC BLOOD PRESSURE: 85 MMHG | WEIGHT: 130.07 LBS | OXYGEN SATURATION: 90 % | HEART RATE: 84 BPM | RESPIRATION RATE: 18 BRPM | TEMPERATURE: 99 F

## 2023-04-23 LAB
ALBUMIN SERPL ELPH-MCNC: 3.2 G/DL — LOW (ref 3.3–5)
ALP SERPL-CCNC: 129 U/L — HIGH (ref 40–120)
ALT FLD-CCNC: 11 U/L — SIGNIFICANT CHANGE UP (ref 10–45)
ANION GAP SERPL CALC-SCNC: 14 MMOL/L — SIGNIFICANT CHANGE UP (ref 5–17)
APTT BLD: 33 SEC — SIGNIFICANT CHANGE UP (ref 27.5–35.5)
AST SERPL-CCNC: 23 U/L — SIGNIFICANT CHANGE UP (ref 10–40)
BASE EXCESS BLDV CALC-SCNC: -0.4 MMOL/L — SIGNIFICANT CHANGE UP (ref -2–3)
BASE EXCESS BLDV CALC-SCNC: 1.7 MMOL/L — SIGNIFICANT CHANGE UP (ref -2–3)
BASOPHILS # BLD AUTO: 0.05 K/UL — SIGNIFICANT CHANGE UP (ref 0–0.2)
BASOPHILS NFR BLD AUTO: 0.4 % — SIGNIFICANT CHANGE UP (ref 0–2)
BILIRUB SERPL-MCNC: 0.3 MG/DL — SIGNIFICANT CHANGE UP (ref 0.2–1.2)
BUN SERPL-MCNC: 17 MG/DL — SIGNIFICANT CHANGE UP (ref 7–23)
CA-I SERPL-SCNC: 1.1 MMOL/L — LOW (ref 1.15–1.33)
CA-I SERPL-SCNC: 1.11 MMOL/L — LOW (ref 1.15–1.33)
CALCIUM SERPL-MCNC: 8.7 MG/DL — SIGNIFICANT CHANGE UP (ref 8.4–10.5)
CHLORIDE BLDV-SCNC: 100 MMOL/L — SIGNIFICANT CHANGE UP (ref 96–108)
CHLORIDE BLDV-SCNC: 97 MMOL/L — SIGNIFICANT CHANGE UP (ref 96–108)
CHLORIDE SERPL-SCNC: 93 MMOL/L — LOW (ref 96–108)
CO2 BLDV-SCNC: 26 MMOL/L — SIGNIFICANT CHANGE UP (ref 22–26)
CO2 BLDV-SCNC: 29 MMOL/L — HIGH (ref 22–26)
CO2 SERPL-SCNC: 24 MMOL/L — SIGNIFICANT CHANGE UP (ref 22–31)
CREAT SERPL-MCNC: 1.77 MG/DL — HIGH (ref 0.5–1.3)
EGFR: 30 ML/MIN/1.73M2 — LOW
EOSINOPHIL # BLD AUTO: 0.04 K/UL — SIGNIFICANT CHANGE UP (ref 0–0.5)
EOSINOPHIL NFR BLD AUTO: 0.4 % — SIGNIFICANT CHANGE UP (ref 0–6)
GAS PNL BLDV: 131 MMOL/L — LOW (ref 136–145)
GAS PNL BLDV: 132 MMOL/L — LOW (ref 136–145)
GAS PNL BLDV: SIGNIFICANT CHANGE UP
GLUCOSE BLDV-MCNC: 161 MG/DL — HIGH (ref 70–99)
GLUCOSE BLDV-MCNC: 269 MG/DL — HIGH (ref 70–99)
GLUCOSE SERPL-MCNC: 289 MG/DL — HIGH (ref 70–99)
HCO3 BLDV-SCNC: 25 MMOL/L — SIGNIFICANT CHANGE UP (ref 22–29)
HCO3 BLDV-SCNC: 27 MMOL/L — SIGNIFICANT CHANGE UP (ref 22–29)
HCT VFR BLD CALC: 21.7 % — LOW (ref 34.5–45)
HCT VFR BLD CALC: 27.9 % — LOW (ref 34.5–45)
HCT VFR BLDA CALC: 21 % — CRITICAL LOW (ref 34.5–46.5)
HCT VFR BLDA CALC: 27 % — LOW (ref 34.5–46.5)
HGB BLD CALC-MCNC: 6.9 G/DL — CRITICAL LOW (ref 11.7–16.1)
HGB BLD CALC-MCNC: 9 G/DL — LOW (ref 11.7–16.1)
HGB BLD-MCNC: 6.6 G/DL — CRITICAL LOW (ref 11.5–15.5)
HGB BLD-MCNC: 8.5 G/DL — LOW (ref 11.5–15.5)
IMM GRANULOCYTES NFR BLD AUTO: 0.9 % — SIGNIFICANT CHANGE UP (ref 0–0.9)
INR BLD: 1.43 RATIO — HIGH (ref 0.88–1.16)
LACTATE BLDV-MCNC: 1.1 MMOL/L — SIGNIFICANT CHANGE UP (ref 0.5–2)
LACTATE BLDV-MCNC: 2.9 MMOL/L — HIGH (ref 0.5–2)
LYMPHOCYTES # BLD AUTO: 1.03 K/UL — SIGNIFICANT CHANGE UP (ref 1–3.3)
LYMPHOCYTES # BLD AUTO: 9 % — LOW (ref 13–44)
MCHC RBC-ENTMCNC: 24.1 PG — LOW (ref 27–34)
MCHC RBC-ENTMCNC: 24.8 PG — LOW (ref 27–34)
MCHC RBC-ENTMCNC: 30.4 GM/DL — LOW (ref 32–36)
MCHC RBC-ENTMCNC: 30.5 GM/DL — LOW (ref 32–36)
MCV RBC AUTO: 79.2 FL — LOW (ref 80–100)
MCV RBC AUTO: 81.3 FL — SIGNIFICANT CHANGE UP (ref 80–100)
MONOCYTES # BLD AUTO: 0.64 K/UL — SIGNIFICANT CHANGE UP (ref 0–0.9)
MONOCYTES NFR BLD AUTO: 5.6 % — SIGNIFICANT CHANGE UP (ref 2–14)
NEUTROPHILS # BLD AUTO: 9.55 K/UL — HIGH (ref 1.8–7.4)
NEUTROPHILS NFR BLD AUTO: 83.7 % — HIGH (ref 43–77)
NRBC # BLD: 0 /100 WBCS — SIGNIFICANT CHANGE UP (ref 0–0)
NRBC # BLD: 0 /100 WBCS — SIGNIFICANT CHANGE UP (ref 0–0)
PCO2 BLDV: 43 MMHG — HIGH (ref 39–42)
PCO2 BLDV: 46 MMHG — HIGH (ref 39–42)
PH BLDV: 7.37 — SIGNIFICANT CHANGE UP (ref 7.32–7.43)
PH BLDV: 7.38 — SIGNIFICANT CHANGE UP (ref 7.32–7.43)
PLATELET # BLD AUTO: 258 K/UL — SIGNIFICANT CHANGE UP (ref 150–400)
PLATELET # BLD AUTO: 374 K/UL — SIGNIFICANT CHANGE UP (ref 150–400)
PO2 BLDV: 20 MMHG — LOW (ref 25–45)
PO2 BLDV: 58 MMHG — HIGH (ref 25–45)
POTASSIUM BLDV-SCNC: 3.6 MMOL/L — SIGNIFICANT CHANGE UP (ref 3.5–5.1)
POTASSIUM BLDV-SCNC: 4.1 MMOL/L — SIGNIFICANT CHANGE UP (ref 3.5–5.1)
POTASSIUM SERPL-MCNC: 3.8 MMOL/L — SIGNIFICANT CHANGE UP (ref 3.5–5.3)
POTASSIUM SERPL-SCNC: 3.8 MMOL/L — SIGNIFICANT CHANGE UP (ref 3.5–5.3)
PROT SERPL-MCNC: 7.6 G/DL — SIGNIFICANT CHANGE UP (ref 6–8.3)
PROTHROM AB SERPL-ACNC: 16.6 SEC — HIGH (ref 10.5–13.4)
RAPID RVP RESULT: SIGNIFICANT CHANGE UP
RBC # BLD: 2.74 M/UL — LOW (ref 3.8–5.2)
RBC # BLD: 3.43 M/UL — LOW (ref 3.8–5.2)
RBC # FLD: 17.5 % — HIGH (ref 10.3–14.5)
RBC # FLD: 17.6 % — HIGH (ref 10.3–14.5)
SAO2 % BLDV: 24.3 % — LOW (ref 67–88)
SAO2 % BLDV: 90.8 % — HIGH (ref 67–88)
SARS-COV-2 RNA SPEC QL NAA+PROBE: SIGNIFICANT CHANGE UP
SODIUM SERPL-SCNC: 131 MMOL/L — LOW (ref 135–145)
TROPONIN T, HIGH SENSITIVITY RESULT: 70 NG/L — HIGH (ref 0–51)
WBC # BLD: 10.2 K/UL — SIGNIFICANT CHANGE UP (ref 3.8–10.5)
WBC # BLD: 11.41 K/UL — HIGH (ref 3.8–10.5)
WBC # FLD AUTO: 10.2 K/UL — SIGNIFICANT CHANGE UP (ref 3.8–10.5)
WBC # FLD AUTO: 11.41 K/UL — HIGH (ref 3.8–10.5)

## 2023-04-23 PROCEDURE — 74177 CT ABD & PELVIS W/CONTRAST: CPT | Mod: 26,MA

## 2023-04-23 PROCEDURE — 71045 X-RAY EXAM CHEST 1 VIEW: CPT | Mod: 26

## 2023-04-23 PROCEDURE — 99285 EMERGENCY DEPT VISIT HI MDM: CPT

## 2023-04-23 RX ORDER — FAMOTIDINE 10 MG/ML
20 INJECTION INTRAVENOUS ONCE
Refills: 0 | Status: COMPLETED | OUTPATIENT
Start: 2023-04-23 | End: 2023-04-23

## 2023-04-23 RX ORDER — ACETAMINOPHEN 500 MG
1000 TABLET ORAL ONCE
Refills: 0 | Status: COMPLETED | OUTPATIENT
Start: 2023-04-23 | End: 2023-04-23

## 2023-04-23 RX ORDER — PANTOPRAZOLE SODIUM 20 MG/1
40 TABLET, DELAYED RELEASE ORAL ONCE
Refills: 0 | Status: COMPLETED | OUTPATIENT
Start: 2023-04-23 | End: 2023-04-24

## 2023-04-23 RX ORDER — SODIUM CHLORIDE 9 MG/ML
1000 INJECTION INTRAMUSCULAR; INTRAVENOUS; SUBCUTANEOUS ONCE
Refills: 0 | Status: COMPLETED | OUTPATIENT
Start: 2023-04-23 | End: 2023-04-23

## 2023-04-23 RX ADMIN — Medication 30 MILLILITER(S): at 18:56

## 2023-04-23 RX ADMIN — FAMOTIDINE 20 MILLIGRAM(S): 10 INJECTION INTRAVENOUS at 18:56

## 2023-04-23 RX ADMIN — SODIUM CHLORIDE 1000 MILLILITER(S): 9 INJECTION INTRAMUSCULAR; INTRAVENOUS; SUBCUTANEOUS at 18:56

## 2023-04-23 RX ADMIN — Medication 400 MILLIGRAM(S): at 18:56

## 2023-04-23 NOTE — ED PROVIDER NOTE - PROGRESS NOTE DETAILS
O'John DO PGY-3: paged tox for c/s for c/f colchicine toxicity Korey Modi, PGY-3- pt with hematuria. Noted to have hgb drop on cbc. Pt received 1L IVF, drop more than expected for dilution effect. Bleed possible gastric ulcer vs hematuria. UA not consistent with infxn. Pt without resp sxs at this time. Korey Modi, PGY-3- pt with hematuria. Noted to have hgb drop on cbc. Pt received 1L IVF, drop more than expected for dilution effect. Bleed possible gastric ulcer vs hematuria. UA not consistent with infxn. Pt without resp sxs at this time.  daughter cody [id number] updated

## 2023-04-23 NOTE — ED PROVIDER NOTE - OBJECTIVE STATEMENT
OAmilcar DO PGY-3:  71 y/o F w/ pmhx of DM, HTN, CAD s/p stents and recent admission for encarditis (dc'd on colchicine) presents today w/ cp and epigastric pain. States it is severe. Pt has been taking colchicine. Denies BRBPR or melanotic stools. Did not take pain meds prior to arrival.

## 2023-04-23 NOTE — ED PROVIDER NOTE - CLINICAL SUMMARY MEDICAL DECISION MAKING FREE TEXT BOX
O'John DO PGY-3: pt w/ hx of cad/stents, DM, HTN presents w/ cp and epigastric pain. Pain is severe and pt has been taking colchicine. DDx include but not limited to: acs vs pna vs pancreaitits vs gerd/PUD. Ordered labs, meds, ekg, XR. Probable admission given severity of initial pain. Will reassess. Dispo pending work up

## 2023-04-23 NOTE — ED PROVIDER NOTE - ATTENDING CONTRIBUTION TO CARE
This is a 72 female who is coming in with epigastric pain that is fairly severe.  Notably she has been on colchicine.  Patient is tachypneic and ill-appearing with mild respiratory distress.  She appears with pale conjunctiva.  Blood work shows the creatinine elevated from 1-1.7.  Given the abdominal tenderness we will do a CT scan of her abdomen.  Chest x-ray is normal.  EKG with biphasic T waves.  Will reassess after imaging and likely admit for further evaluation and treatment.  I suspect that there may be colchicine overdose related to her 0.6 mg twice a day in the context of FARSHAD.  Celestin placed.

## 2023-04-23 NOTE — ED PROVIDER NOTE - PHYSICAL EXAMINATION
CONSTITUTIONAL: Well-developed; well-nourished; +appears weak   SKIN: warm, dry  HEAD: Normocephalic; atraumatic.  EYES: no conjunctival injection. PERRL.   ENT: No nasal discharge; airway clear.  NECK: Supple; non tender.  CARD: S1, S2 normal; Regular rate and rhythm.   RESP: No wheezes, rales or rhonchi. Good air movement bilaterally.   ABD: +epigastric ttp   EXT:  No cyanosis or edema.   NEURO: AOx3  PSYCH: Cooperative, appropriate.

## 2023-04-23 NOTE — ED PROVIDER NOTE - CARE PLAN
1 Principal Discharge DX:	Hematuria  Secondary Diagnosis:	Epigastric pain  Secondary Diagnosis:	Anemia

## 2023-04-23 NOTE — ED ADULT NURSE REASSESSMENT NOTE - NS ED NURSE REASSESS COMMENT FT1
Celestin catheter inserted using sterile technique. Second RN present to confirm sterility. Bedside drainage to gravity. Stat lock in place.

## 2023-04-23 NOTE — ED ADULT NURSE NOTE - OBJECTIVE STATEMENT
Patient is a 72 year old female complaining of chest pain. A&Ox4. Ambulates steady. Patient reports being admitted on 4/9/23 at [facility] for pericarditis and new onset of a-fib RVR, today she had increased SOB and epigastric pain that is stabbing, non radiating. On assessment patient diaphoretic, tachypnea, spo2 88% on room air - placed on 2L NC, NSR on the cardiac monitor, abdomen soft, moving all extremities well, skin is warm. PMH a-fib - on Eliquis, HTN, DM.

## 2023-04-23 NOTE — ED ADULT NURSE NOTE - NS ED NOTE  TALK SOMEONE YN
Lab Results   Component Value Date    EGFR 8 04/19/2022    EGFR 7 04/07/2022    EGFR 9 04/06/2022    CREATININE 6 52 (H) 04/19/2022    CREATININE 6 80 (H) 04/07/2022    CREATININE 5 79 (H) 04/06/2022   On hemodialysis on Monday Wednesday Friday  Nephrology consulted  Will continue calcium, vitamin D3, renal diet and torsemide No

## 2023-04-24 DIAGNOSIS — R77.8 OTHER SPECIFIED ABNORMALITIES OF PLASMA PROTEINS: ICD-10-CM

## 2023-04-24 DIAGNOSIS — I10 ESSENTIAL (PRIMARY) HYPERTENSION: ICD-10-CM

## 2023-04-24 DIAGNOSIS — J18.9 PNEUMONIA, UNSPECIFIED ORGANISM: ICD-10-CM

## 2023-04-24 DIAGNOSIS — D64.9 ANEMIA, UNSPECIFIED: ICD-10-CM

## 2023-04-24 DIAGNOSIS — Z29.9 ENCOUNTER FOR PROPHYLACTIC MEASURES, UNSPECIFIED: ICD-10-CM

## 2023-04-24 DIAGNOSIS — R31.9 HEMATURIA, UNSPECIFIED: ICD-10-CM

## 2023-04-24 DIAGNOSIS — R10.13 EPIGASTRIC PAIN: ICD-10-CM

## 2023-04-24 DIAGNOSIS — E87.1 HYPO-OSMOLALITY AND HYPONATREMIA: ICD-10-CM

## 2023-04-24 DIAGNOSIS — N17.9 ACUTE KIDNEY FAILURE, UNSPECIFIED: ICD-10-CM

## 2023-04-24 LAB
ALBUMIN SERPL ELPH-MCNC: 2.7 G/DL — LOW (ref 3.3–5)
ALP SERPL-CCNC: 101 U/L — SIGNIFICANT CHANGE UP (ref 40–120)
ALT FLD-CCNC: 9 U/L — LOW (ref 10–45)
ANION GAP SERPL CALC-SCNC: 12 MMOL/L — SIGNIFICANT CHANGE UP (ref 5–17)
APPEARANCE UR: CLEAR — SIGNIFICANT CHANGE UP
AST SERPL-CCNC: 20 U/L — SIGNIFICANT CHANGE UP (ref 10–40)
BACTERIA # UR AUTO: NEGATIVE — SIGNIFICANT CHANGE UP
BASOPHILS # BLD AUTO: 0.06 K/UL — SIGNIFICANT CHANGE UP (ref 0–0.2)
BASOPHILS NFR BLD AUTO: 0.7 % — SIGNIFICANT CHANGE UP (ref 0–2)
BILIRUB SERPL-MCNC: 0.4 MG/DL — SIGNIFICANT CHANGE UP (ref 0.2–1.2)
BILIRUB UR-MCNC: NEGATIVE — SIGNIFICANT CHANGE UP
BLD GP AB SCN SERPL QL: NEGATIVE — SIGNIFICANT CHANGE UP
BUN SERPL-MCNC: 16 MG/DL — SIGNIFICANT CHANGE UP (ref 7–23)
CALCIUM SERPL-MCNC: 8.3 MG/DL — LOW (ref 8.4–10.5)
CHLORIDE SERPL-SCNC: 101 MMOL/L — SIGNIFICANT CHANGE UP (ref 96–108)
CO2 SERPL-SCNC: 24 MMOL/L — SIGNIFICANT CHANGE UP (ref 22–31)
COLOR SPEC: SIGNIFICANT CHANGE UP
CREAT ?TM UR-MCNC: 32 MG/DL — SIGNIFICANT CHANGE UP
CREAT SERPL-MCNC: 1.74 MG/DL — HIGH (ref 0.5–1.3)
CRP SERPL-MCNC: 109 MG/L — HIGH (ref 0–4)
DIFF PNL FLD: ABNORMAL
EGFR: 31 ML/MIN/1.73M2 — LOW
EOSINOPHIL # BLD AUTO: 0.15 K/UL — SIGNIFICANT CHANGE UP (ref 0–0.5)
EOSINOPHIL NFR BLD AUTO: 1.6 % — SIGNIFICANT CHANGE UP (ref 0–6)
EPI CELLS # UR: 4 /HPF — SIGNIFICANT CHANGE UP
ERYTHROCYTE [SEDIMENTATION RATE] IN BLOOD: 120 MM/HR — HIGH (ref 0–20)
GLUCOSE BLDC GLUCOMTR-MCNC: 250 MG/DL — HIGH (ref 70–99)
GLUCOSE BLDC GLUCOMTR-MCNC: 287 MG/DL — HIGH (ref 70–99)
GLUCOSE SERPL-MCNC: 140 MG/DL — HIGH (ref 70–99)
GLUCOSE UR QL: ABNORMAL
HCT VFR BLD CALC: 27.6 % — LOW (ref 34.5–45)
HCT VFR BLD CALC: 27.7 % — LOW (ref 34.5–45)
HGB BLD-MCNC: 8.6 G/DL — LOW (ref 11.5–15.5)
HGB BLD-MCNC: 8.6 G/DL — LOW (ref 11.5–15.5)
HYALINE CASTS # UR AUTO: 1 /LPF — SIGNIFICANT CHANGE UP (ref 0–7)
IMM GRANULOCYTES NFR BLD AUTO: 1.4 % — HIGH (ref 0–0.9)
KETONES UR-MCNC: NEGATIVE — SIGNIFICANT CHANGE UP
LEUKOCYTE ESTERASE UR-ACNC: NEGATIVE — SIGNIFICANT CHANGE UP
LYMPHOCYTES # BLD AUTO: 1.47 K/UL — SIGNIFICANT CHANGE UP (ref 1–3.3)
LYMPHOCYTES # BLD AUTO: 16.1 % — SIGNIFICANT CHANGE UP (ref 13–44)
MAGNESIUM SERPL-MCNC: 1.6 MG/DL — SIGNIFICANT CHANGE UP (ref 1.6–2.6)
MCHC RBC-ENTMCNC: 25.3 PG — LOW (ref 27–34)
MCHC RBC-ENTMCNC: 25.5 PG — LOW (ref 27–34)
MCHC RBC-ENTMCNC: 31 GM/DL — LOW (ref 32–36)
MCHC RBC-ENTMCNC: 31.2 GM/DL — LOW (ref 32–36)
MCV RBC AUTO: 81.2 FL — SIGNIFICANT CHANGE UP (ref 80–100)
MCV RBC AUTO: 82.2 FL — SIGNIFICANT CHANGE UP (ref 80–100)
MONOCYTES # BLD AUTO: 0.54 K/UL — SIGNIFICANT CHANGE UP (ref 0–0.9)
MONOCYTES NFR BLD AUTO: 5.9 % — SIGNIFICANT CHANGE UP (ref 2–14)
NEUTROPHILS # BLD AUTO: 6.76 K/UL — SIGNIFICANT CHANGE UP (ref 1.8–7.4)
NEUTROPHILS NFR BLD AUTO: 74.3 % — SIGNIFICANT CHANGE UP (ref 43–77)
NITRITE UR-MCNC: NEGATIVE — SIGNIFICANT CHANGE UP
NRBC # BLD: 0 /100 WBCS — SIGNIFICANT CHANGE UP (ref 0–0)
NRBC # BLD: 0 /100 WBCS — SIGNIFICANT CHANGE UP (ref 0–0)
PH UR: 7 — SIGNIFICANT CHANGE UP (ref 5–8)
PHOSPHATE SERPL-MCNC: 3.5 MG/DL — SIGNIFICANT CHANGE UP (ref 2.5–4.5)
PLATELET # BLD AUTO: 222 K/UL — SIGNIFICANT CHANGE UP (ref 150–400)
PLATELET # BLD AUTO: 246 K/UL — SIGNIFICANT CHANGE UP (ref 150–400)
POTASSIUM SERPL-MCNC: 3.7 MMOL/L — SIGNIFICANT CHANGE UP (ref 3.5–5.3)
POTASSIUM SERPL-SCNC: 3.7 MMOL/L — SIGNIFICANT CHANGE UP (ref 3.5–5.3)
POTASSIUM UR-SCNC: 38 MMOL/L — SIGNIFICANT CHANGE UP
PROCALCITONIN SERPL-MCNC: 0.33 NG/ML — HIGH (ref 0.02–0.1)
PROT ?TM UR-MCNC: 262 MG/DL — HIGH (ref 0–12)
PROT SERPL-MCNC: 6.4 G/DL — SIGNIFICANT CHANGE UP (ref 6–8.3)
PROT UR-MCNC: ABNORMAL
PROT/CREAT UR-RTO: 8.2 RATIO — HIGH (ref 0–0.2)
RBC # BLD: 3.37 M/UL — LOW (ref 3.8–5.2)
RBC # BLD: 3.37 M/UL — LOW (ref 3.8–5.2)
RBC # BLD: 3.4 M/UL — LOW (ref 3.8–5.2)
RBC # FLD: 17.2 % — HIGH (ref 10.3–14.5)
RBC # FLD: 17.3 % — HIGH (ref 10.3–14.5)
RBC CASTS # UR COMP ASSIST: 1016 /HPF — HIGH (ref 0–4)
RETICS #: 44.1 K/UL — SIGNIFICANT CHANGE UP (ref 25–125)
RETICS/RBC NFR: 1.3 % — SIGNIFICANT CHANGE UP (ref 0.5–2.5)
RH IG SCN BLD-IMP: POSITIVE — SIGNIFICANT CHANGE UP
SODIUM SERPL-SCNC: 137 MMOL/L — SIGNIFICANT CHANGE UP (ref 135–145)
SODIUM UR-SCNC: 61 MMOL/L — SIGNIFICANT CHANGE UP
SP GR SPEC: 1.01 — SIGNIFICANT CHANGE UP (ref 1.01–1.02)
TROPONIN T, HIGH SENSITIVITY RESULT: 52 NG/L — HIGH (ref 0–51)
UROBILINOGEN FLD QL: NEGATIVE — SIGNIFICANT CHANGE UP
UUN UR-MCNC: 178 MG/DL — SIGNIFICANT CHANGE UP
WBC # BLD: 10 K/UL — SIGNIFICANT CHANGE UP (ref 3.8–10.5)
WBC # BLD: 9.11 K/UL — SIGNIFICANT CHANGE UP (ref 3.8–10.5)
WBC # FLD AUTO: 10 K/UL — SIGNIFICANT CHANGE UP (ref 3.8–10.5)
WBC # FLD AUTO: 9.11 K/UL — SIGNIFICANT CHANGE UP (ref 3.8–10.5)
WBC UR QL: 13 /HPF — HIGH (ref 0–5)

## 2023-04-24 PROCEDURE — 99223 1ST HOSP IP/OBS HIGH 75: CPT | Mod: GC

## 2023-04-24 RX ORDER — SODIUM CHLORIDE 9 MG/ML
1000 INJECTION, SOLUTION INTRAVENOUS
Refills: 0 | Status: DISCONTINUED | OUTPATIENT
Start: 2023-04-24 | End: 2023-04-28

## 2023-04-24 RX ORDER — DEXTROSE 50 % IN WATER 50 %
25 SYRINGE (ML) INTRAVENOUS ONCE
Refills: 0 | Status: DISCONTINUED | OUTPATIENT
Start: 2023-04-24 | End: 2023-04-28

## 2023-04-24 RX ORDER — INSULIN LISPRO 100/ML
VIAL (ML) SUBCUTANEOUS
Refills: 0 | Status: DISCONTINUED | OUTPATIENT
Start: 2023-04-24 | End: 2023-04-28

## 2023-04-24 RX ORDER — GABAPENTIN 400 MG/1
300 CAPSULE ORAL
Refills: 0 | Status: DISCONTINUED | OUTPATIENT
Start: 2023-04-24 | End: 2023-04-28

## 2023-04-24 RX ORDER — GABAPENTIN 400 MG/1
1 CAPSULE ORAL
Qty: 0 | Refills: 0 | DISCHARGE

## 2023-04-24 RX ORDER — HYDRALAZINE HCL 50 MG
75 TABLET ORAL THREE TIMES A DAY
Refills: 0 | Status: DISCONTINUED | OUTPATIENT
Start: 2023-04-24 | End: 2023-04-28

## 2023-04-24 RX ORDER — HYDRALAZINE HCL 50 MG
3 TABLET ORAL
Qty: 0 | Refills: 0 | DISCHARGE

## 2023-04-24 RX ORDER — INSULIN LISPRO 100/ML
VIAL (ML) SUBCUTANEOUS AT BEDTIME
Refills: 0 | Status: DISCONTINUED | OUTPATIENT
Start: 2023-04-24 | End: 2023-04-28

## 2023-04-24 RX ORDER — COLCHICINE 0.6 MG
0.3 TABLET ORAL DAILY
Refills: 0 | Status: DISCONTINUED | OUTPATIENT
Start: 2023-04-24 | End: 2023-04-28

## 2023-04-24 RX ORDER — COLCHICINE 0.6 MG
0.6 TABLET ORAL DAILY
Refills: 0 | Status: DISCONTINUED | OUTPATIENT
Start: 2023-04-24 | End: 2023-04-24

## 2023-04-24 RX ORDER — METOPROLOL TARTRATE 50 MG
1 TABLET ORAL
Qty: 0 | Refills: 0 | DISCHARGE

## 2023-04-24 RX ORDER — METOPROLOL TARTRATE 50 MG
50 TABLET ORAL
Refills: 0 | Status: DISCONTINUED | OUTPATIENT
Start: 2023-04-24 | End: 2023-04-28

## 2023-04-24 RX ORDER — DEXTROSE 50 % IN WATER 50 %
15 SYRINGE (ML) INTRAVENOUS ONCE
Refills: 0 | Status: DISCONTINUED | OUTPATIENT
Start: 2023-04-24 | End: 2023-04-28

## 2023-04-24 RX ORDER — LANOLIN ALCOHOL/MO/W.PET/CERES
3 CREAM (GRAM) TOPICAL AT BEDTIME
Refills: 0 | Status: DISCONTINUED | OUTPATIENT
Start: 2023-04-24 | End: 2023-04-28

## 2023-04-24 RX ORDER — SODIUM CHLORIDE 9 MG/ML
1000 INJECTION INTRAMUSCULAR; INTRAVENOUS; SUBCUTANEOUS ONCE
Refills: 0 | Status: COMPLETED | OUTPATIENT
Start: 2023-04-24 | End: 2023-04-24

## 2023-04-24 RX ORDER — COLCHICINE 0.6 MG
0.6 TABLET ORAL
Refills: 0 | Status: DISCONTINUED | OUTPATIENT
Start: 2023-04-24 | End: 2023-04-24

## 2023-04-24 RX ORDER — DEXTROSE 50 % IN WATER 50 %
12.5 SYRINGE (ML) INTRAVENOUS ONCE
Refills: 0 | Status: DISCONTINUED | OUTPATIENT
Start: 2023-04-24 | End: 2023-04-28

## 2023-04-24 RX ORDER — GABAPENTIN 400 MG/1
300 CAPSULE ORAL THREE TIMES A DAY
Refills: 0 | Status: DISCONTINUED | OUTPATIENT
Start: 2023-04-24 | End: 2023-04-24

## 2023-04-24 RX ORDER — PANTOPRAZOLE SODIUM 20 MG/1
1 TABLET, DELAYED RELEASE ORAL
Qty: 0 | Refills: 0 | DISCHARGE

## 2023-04-24 RX ORDER — PANTOPRAZOLE SODIUM 20 MG/1
40 TABLET, DELAYED RELEASE ORAL EVERY 12 HOURS
Refills: 0 | Status: DISCONTINUED | OUTPATIENT
Start: 2023-04-24 | End: 2023-04-25

## 2023-04-24 RX ORDER — ATORVASTATIN CALCIUM 80 MG/1
20 TABLET, FILM COATED ORAL AT BEDTIME
Refills: 0 | Status: DISCONTINUED | OUTPATIENT
Start: 2023-04-24 | End: 2023-04-28

## 2023-04-24 RX ORDER — ACETAMINOPHEN 500 MG
650 TABLET ORAL EVERY 6 HOURS
Refills: 0 | Status: DISCONTINUED | OUTPATIENT
Start: 2023-04-24 | End: 2023-04-28

## 2023-04-24 RX ORDER — GLUCAGON INJECTION, SOLUTION 0.5 MG/.1ML
1 INJECTION, SOLUTION SUBCUTANEOUS ONCE
Refills: 0 | Status: DISCONTINUED | OUTPATIENT
Start: 2023-04-24 | End: 2023-04-28

## 2023-04-24 RX ADMIN — Medication 0.1 MILLIGRAM(S): at 15:43

## 2023-04-24 RX ADMIN — PANTOPRAZOLE SODIUM 40 MILLIGRAM(S): 20 TABLET, DELAYED RELEASE ORAL at 17:37

## 2023-04-24 RX ADMIN — Medication 0.1 MILLIGRAM(S): at 16:36

## 2023-04-24 RX ADMIN — Medication 650 MILLIGRAM(S): at 15:42

## 2023-04-24 RX ADMIN — Medication 50 MILLIGRAM(S): at 17:36

## 2023-04-24 RX ADMIN — Medication 2: at 17:34

## 2023-04-24 RX ADMIN — SODIUM CHLORIDE 1000 MILLILITER(S): 9 INJECTION INTRAMUSCULAR; INTRAVENOUS; SUBCUTANEOUS at 11:29

## 2023-04-24 RX ADMIN — Medication 1: at 21:58

## 2023-04-24 RX ADMIN — Medication 75 MILLIGRAM(S): at 14:04

## 2023-04-24 RX ADMIN — ATORVASTATIN CALCIUM 20 MILLIGRAM(S): 80 TABLET, FILM COATED ORAL at 21:34

## 2023-04-24 RX ADMIN — PANTOPRAZOLE SODIUM 40 MILLIGRAM(S): 20 TABLET, DELAYED RELEASE ORAL at 05:41

## 2023-04-24 RX ADMIN — GABAPENTIN 300 MILLIGRAM(S): 400 CAPSULE ORAL at 17:37

## 2023-04-24 RX ADMIN — Medication 75 MILLIGRAM(S): at 21:34

## 2023-04-24 NOTE — H&P ADULT - PROBLEM SELECTOR PLAN 1
- Toxicology consulted in ED and does not appear to be colchicine toxicity  - CT with patchy opacities - Toxicology consulted in ED and does not appear to be colchicine toxicity  - CT with patchy opacities in the lungs, possibly referred pain from pneumonia vs. gastritis/PUD iso high dose aspirin  - Procal 0.33  - Obtain echo to ensure resolution of pericardial effusion

## 2023-04-24 NOTE — H&P ADULT - ATTENDING COMMENTS
Patient is a 72 year old female, with PMH of DM, HTN, CAD s/p stent (2019), recent admission for pericardial effusion, started on high-dose ASA & colchicine for suspected pericarditis, course complicated by c/b new-onset AFib with RVR for which Eliquis was started, presenting for epigastric abdominal pain.     Troponin elevated but now downtrending. CXR reviewed; appears clear with no signs of infection. EKG reviewed; NSR with no ischemic changes noted.     #Epigastric pain   #FARSHAD   #Anemia   #Hyponatremia     - patient presented due to chest/epigastric pain; original concern for colchicine toxicity but less likely; tox recs appreciated; may be due to gastritis vs PUD?  - noted to have drop in Hgb down to 6.6; given 1 PRBC with improvement to 8.6; unsure if low Hgb level was accurate since Hgb improved more than expected; will hold AC for now; start PPI and monitor  - can consider GI consult if noted to have signs of GI bleed and/or Hgb continues to drop   - currently on O2 but does not appear to be in respiratory distress; wean off O2 and monitor  - continue home colchicine; pericardial effusion appears to be resolved based on CT abd findings but will obtain repeat ECHO to ensure full resolution  - opacities noted on CT abd; low concern for PNA; procal mildly elevated; will monitor off abx for now   - Cr elevated based on prior labs; will give IVF and monitor for now   - hold eliquis for now   - repeat CBC later today and then monitor daily if stable     Rest as above. Discussed with HS.

## 2023-04-24 NOTE — H&P ADULT - PROBLEM SELECTOR PLAN 4
- Possible early pyelonephritis vs. motion artifact  - UA with hematuria - Possible early pyelonephritis vs. motion artifact  - UA with hematuria  - Additional fluids as had labile blood pressure, possible pre-renal vs intrinsic (ATN)

## 2023-04-24 NOTE — H&P ADULT - PROBLEM SELECTOR PLAN 2
- CT with patchy opacities - CT with patchy opacities, CXR clear. Lungs CTAB  - Procal 0.33  - CTM off antibiotics as WBC downtrending, vitals stable

## 2023-04-24 NOTE — H&P ADULT - PROBLEM SELECTOR PLAN 5
Labile - Improved s/p 2L fluid bolus  - Will restart her home clonidine and hydralazine, hold enalapril in the setting of FARSHAD

## 2023-04-24 NOTE — H&P ADULT - HISTORY OF PRESENT ILLNESS
Ms. Us is a 72 year old female with PMH of DM, HTN, CAD s/p stent (2019), recent admission for pericardial effusion, started on high-dose ASA & colchicine for suspected pericarditis, course complicated by c/b new-onset AFib with RVR for which Eliquis was started, presenting for epigastric abdominal pain Ms. Us is a 72 year old female with PMH of DM, HTN, CAD s/p stent (2019), recent admission for pericardial effusion, started on high-dose ASA & colchicine for suspected pericarditis, course complicated by c/b new-onset AFib with RVR for which Eliquis was started, presenting for epigastric abdominal pain. She notes that she has both chest pain and epigastric pain. She notes that breathing in worsens her chest pain. She also notes that eating worsens her epigastric pain. She has no changes to her bowel movements, says they are "white," which is regular for her. She has noted her urine appears more brown, but denies pain on urination or change in odor of urination.

## 2023-04-24 NOTE — H&P ADULT - NSHPLABSRESULTS_GEN_ALL_CORE
LABS: When present labs, imaging, and ECG were personally reviewed                          8.6    9.11  )-----------( 246      ( 2023 06:38 )             27.7       04-    131<L>  |  93<L>  |  17  ----------------------------<  289<H>  3.8   |  24  |  1.77<H>    Ca    8.7      2023 18:04    TPro  7.6  /  Alb  3.2<L>  /  TBili  0.3  /  DBili  x   /  AST  23  /  ALT  11  /  AlkPhos  129<H>  04-23       LIVER FUNCTIONS - ( 2023 18:04 )  Alb: 3.2 g/dL / Pro: 7.6 g/dL / ALK PHOS: 129 U/L / ALT: 11 U/L / AST: 23 U/L / GGT: x                    Urinalysis Basic - ( 2023 01:16 )    Color: LIGHT BROWN / Appearance: Clear / S.015 / pH: x  Gluc: x / Ketone: Negative  / Bili: Negative / Urobili: Negative   Blood: x / Protein: 300 mg/dL / Nitrite: Negative   Leuk Esterase: Negative / RBC: 1016 /hpf / WBC 13 /HPF   Sq Epi: x / Non Sq Epi: x / Bacteria: Negative        PT/INR - ( 2023 18:04 )   PT: 16.6 sec;   INR: 1.43 ratio         PTT - ( 2023 18:04 )  PTT:33.0 sec    Lactate Trend            CAPILLARY BLOOD GLUCOSE                RADIOLOGY & ADDITIONAL TESTS:   < from: CT Abdomen and Pelvis w/ IV Cont (23 @ 21:31) >    IMPRESSION:  Patchy poorly marginated parenchymal hypoattenuation present within the   bilateral kidneys, right greater than left, with subtle loss of   corticomedullary differentiation on the right. Findings may represent   underlying early pyelonephritis. Findings are limited by mild motion   artifact in this region. No abscess or perinephric fluid collections are   identified.    New partially visualized patchy opacities in the right middle and left   lower lobes which may be infectious or inflammatory.    Resolved pericardial effusion compared to prior exam from 2023.        --- End of Report ---    < end of copied text >    < from: Xray Chest 1 View- PORTABLE-Urgent (23 @ 18:22) >    IMPRESSION:  No focal consolidation.    --- End of Report ---

## 2023-04-24 NOTE — H&P ADULT - PROBLEM SELECTOR PLAN 8
DVT: SCDs iso hemoglobin drop  Diet:   Dispo DVT: SCDs iso hemoglobin drop  Diet: Vegetarian, CC/DASH  Dispo: Home with home PT per recent admission

## 2023-04-24 NOTE — H&P ADULT - NSHPREVIEWOFSYSTEMS_GEN_ALL_CORE
CONSTITUTIONAL: No fevers, chills, fatigue, dizziness, weakness, unexpected weight change  EYES: No double vision, blurry vision  ENT: No ear pain, nasal congestion, runny nose, sore throat  CV: No chest pain, palpitations  PULM: No cough, shortness of breath  GI: No abdominal pain, nausea, vomiting, diarrhea, constipation  : No dysuria, polyuria, hematuria  SKIN: No rashes, swelling  MSK: No muscle aches  NEURO: No headache, paresthesias  PSYCHIATRIC: Denies suicidal, homicidal ideations. No auditory, visual, tactile hallucinations CONSTITUTIONAL: No fevers, chills, fatigue, dizziness, weakness, unexpected weight change  EYES: No double vision, blurry vision  ENT: No ear pain, nasal congestion, runny nose, sore throat. +Clears throat often  CV: +Pleuritic chest pain  PULM: No cough, shortness of breath  GI: + Epigastric pain worsened by eating. No hematochezia/melena  : + Brown colored urine  SKIN: No rashes, swelling  MSK: No muscle aches  NEURO: No headache, paresthesias  PSYCHIATRIC: Denies suicidal, homicidal ideations. No auditory, visual, tactile hallucinations

## 2023-04-24 NOTE — H&P ADULT - PROBLEM SELECTOR PLAN 3
- Drop in hemoglobin 8.5 to 6.6, improved to 8.6 s/p 1 unit PRBCs - Drop in hemoglobin 8.5 to 6.6, improved to 8.6 s/p 1 unit PRBCs  - Repeat CBC in PM 4/24  - Held aspirin and Eliquis in setting of hemoglobin drop, will likely restart 4/25

## 2023-04-24 NOTE — H&P ADULT - NSHPPHYSICALEXAM_GEN_ALL_CORE
GENERAL:   EYES: Conjunctiva noninjected or pale, sclera anicteric  HENT: NC/AT, moist mucous membranes  NECK: Supple, trachea midline  LUNG: Nonlabored respirations, no wheezes, rales  CV: RRR, Pulses- Radial: 2+ b/l  ABDOMEN: Nondistended, nontender  MSK: No visible deformities, nontender extremities  SKIN: No rashes, bruises  NEURO: AAOx4 (to person, place, time, event), no tremor, steady gait  PSYCH: Normal mood and affect GENERAL: Lying in bed in no acute distress, wearing nasal cannula 2 L  EYES: Conjunctiva noninjected or pale, sclera anicteric  HENT: NC/AT, moist mucous membranes  NECK: Supple, trachea midline  LUNG: Nonlabored respirations, no wheezes, rales  CV: RRR, Pulses- Radial: 2+ b/l  ABDOMEN: Nondistended, nontender  MSK: No visible deformities, nontender extremities  SKIN: No rashes, bruises  NEURO: AAOx4 (to person, place, time, event), no tremor, steady gait  PSYCH: Normal mood and affect

## 2023-04-24 NOTE — H&P ADULT - ASSESSMENT
Ms. Us is a 72 year old female with PMH of DM, HTN, CAD s/p stent (2019), recent admission for pericardial effusion, started on high-dose ASA & colchicine for suspected pericarditis, course complicated by c/b new-onset AFib with RVR for which Eliquis was started, presenting for epigastric abdominal pain.    ******* TENTATIVE UNTIL DISCUSSED WITH ATTENDING ***** Ms. Us is a 72 year old female with PMH of DM, HTN, CAD s/p stent (2019), recent admission for pericardial effusion, started on high-dose ASA & colchicine for suspected pericarditis, course complicated by c/b new-onset AFib with RVR for which Eliquis was started, presenting for epigastric abdominal pain, possibly 2/2 gastritis/PUD in the setting of ASA. CT concerning for pyelo vs. motion artifact, also noted to have hematuria. CT also concerning for patchy opacities in the lung.

## 2023-04-25 LAB
ANION GAP SERPL CALC-SCNC: 11 MMOL/L — SIGNIFICANT CHANGE UP (ref 5–17)
BUN SERPL-MCNC: 16 MG/DL — SIGNIFICANT CHANGE UP (ref 7–23)
CALCIUM SERPL-MCNC: 8.1 MG/DL — LOW (ref 8.4–10.5)
CHLORIDE SERPL-SCNC: 100 MMOL/L — SIGNIFICANT CHANGE UP (ref 96–108)
CO2 SERPL-SCNC: 24 MMOL/L — SIGNIFICANT CHANGE UP (ref 22–31)
CREAT SERPL-MCNC: 1.8 MG/DL — HIGH (ref 0.5–1.3)
CULTURE RESULTS: NO GROWTH — SIGNIFICANT CHANGE UP
EGFR: 30 ML/MIN/1.73M2 — LOW
GLUCOSE BLDC GLUCOMTR-MCNC: 191 MG/DL — HIGH (ref 70–99)
GLUCOSE BLDC GLUCOMTR-MCNC: 210 MG/DL — HIGH (ref 70–99)
GLUCOSE BLDC GLUCOMTR-MCNC: 256 MG/DL — HIGH (ref 70–99)
GLUCOSE BLDC GLUCOMTR-MCNC: 260 MG/DL — HIGH (ref 70–99)
GLUCOSE SERPL-MCNC: 169 MG/DL — HIGH (ref 70–99)
HCT VFR BLD CALC: 25.6 % — LOW (ref 34.5–45)
HGB BLD-MCNC: 8.1 G/DL — LOW (ref 11.5–15.5)
MAGNESIUM SERPL-MCNC: 1.6 MG/DL — SIGNIFICANT CHANGE UP (ref 1.6–2.6)
MCHC RBC-ENTMCNC: 25.4 PG — LOW (ref 27–34)
MCHC RBC-ENTMCNC: 31.6 GM/DL — LOW (ref 32–36)
MCV RBC AUTO: 80.3 FL — SIGNIFICANT CHANGE UP (ref 80–100)
NRBC # BLD: 0 /100 WBCS — SIGNIFICANT CHANGE UP (ref 0–0)
PHOSPHATE SERPL-MCNC: 3 MG/DL — SIGNIFICANT CHANGE UP (ref 2.5–4.5)
PLATELET # BLD AUTO: 227 K/UL — SIGNIFICANT CHANGE UP (ref 150–400)
POTASSIUM SERPL-MCNC: 3.3 MMOL/L — LOW (ref 3.5–5.3)
POTASSIUM SERPL-SCNC: 3.3 MMOL/L — LOW (ref 3.5–5.3)
RBC # BLD: 3.19 M/UL — LOW (ref 3.8–5.2)
RBC # FLD: 17.3 % — HIGH (ref 10.3–14.5)
SODIUM SERPL-SCNC: 135 MMOL/L — SIGNIFICANT CHANGE UP (ref 135–145)
SPECIMEN SOURCE: SIGNIFICANT CHANGE UP
WBC # BLD: 10.12 K/UL — SIGNIFICANT CHANGE UP (ref 3.8–10.5)
WBC # FLD AUTO: 10.12 K/UL — SIGNIFICANT CHANGE UP (ref 3.8–10.5)

## 2023-04-25 PROCEDURE — 76770 US EXAM ABDO BACK WALL COMP: CPT | Mod: 26

## 2023-04-25 PROCEDURE — 99233 SBSQ HOSP IP/OBS HIGH 50: CPT | Mod: GC

## 2023-04-25 PROCEDURE — 71260 CT THORAX DX C+: CPT | Mod: 26

## 2023-04-25 PROCEDURE — 93308 TTE F-UP OR LMTD: CPT | Mod: 26

## 2023-04-25 RX ORDER — POTASSIUM CHLORIDE 20 MEQ
40 PACKET (EA) ORAL ONCE
Refills: 0 | Status: COMPLETED | OUTPATIENT
Start: 2023-04-25 | End: 2023-04-25

## 2023-04-25 RX ORDER — PIPERACILLIN AND TAZOBACTAM 4; .5 G/20ML; G/20ML
3.38 INJECTION, POWDER, LYOPHILIZED, FOR SOLUTION INTRAVENOUS ONCE
Refills: 0 | Status: COMPLETED | OUTPATIENT
Start: 2023-04-25 | End: 2023-04-25

## 2023-04-25 RX ORDER — APIXABAN 2.5 MG/1
2.5 TABLET, FILM COATED ORAL EVERY 12 HOURS
Refills: 0 | Status: DISCONTINUED | OUTPATIENT
Start: 2023-04-25 | End: 2023-04-28

## 2023-04-25 RX ORDER — APIXABAN 2.5 MG/1
5 TABLET, FILM COATED ORAL EVERY 12 HOURS
Refills: 0 | Status: DISCONTINUED | OUTPATIENT
Start: 2023-04-25 | End: 2023-04-25

## 2023-04-25 RX ORDER — APIXABAN 2.5 MG/1
2.5 TABLET, FILM COATED ORAL EVERY 12 HOURS
Refills: 0 | Status: DISCONTINUED | OUTPATIENT
Start: 2023-04-25 | End: 2023-04-25

## 2023-04-25 RX ORDER — INSULIN LISPRO 100/ML
2 VIAL (ML) SUBCUTANEOUS
Refills: 0 | Status: DISCONTINUED | OUTPATIENT
Start: 2023-04-25 | End: 2023-04-27

## 2023-04-25 RX ORDER — PANTOPRAZOLE SODIUM 20 MG/1
40 TABLET, DELAYED RELEASE ORAL DAILY
Refills: 0 | Status: DISCONTINUED | OUTPATIENT
Start: 2023-04-25 | End: 2023-04-28

## 2023-04-25 RX ORDER — SUCRALFATE 1 G
1 TABLET ORAL
Refills: 0 | Status: DISCONTINUED | OUTPATIENT
Start: 2023-04-25 | End: 2023-04-28

## 2023-04-25 RX ORDER — PIPERACILLIN AND TAZOBACTAM 4; .5 G/20ML; G/20ML
3.38 INJECTION, POWDER, LYOPHILIZED, FOR SOLUTION INTRAVENOUS EVERY 8 HOURS
Refills: 0 | Status: DISCONTINUED | OUTPATIENT
Start: 2023-04-25 | End: 2023-04-28

## 2023-04-25 RX ADMIN — Medication 3: at 12:28

## 2023-04-25 RX ADMIN — APIXABAN 2.5 MILLIGRAM(S): 2.5 TABLET, FILM COATED ORAL at 17:22

## 2023-04-25 RX ADMIN — Medication 650 MILLIGRAM(S): at 00:25

## 2023-04-25 RX ADMIN — Medication 0.3 MILLIGRAM(S): at 12:26

## 2023-04-25 RX ADMIN — PANTOPRAZOLE SODIUM 40 MILLIGRAM(S): 20 TABLET, DELAYED RELEASE ORAL at 05:10

## 2023-04-25 RX ADMIN — Medication 1 GRAM(S): at 12:27

## 2023-04-25 RX ADMIN — Medication 75 MILLIGRAM(S): at 05:11

## 2023-04-25 RX ADMIN — Medication 40 MILLIEQUIVALENT(S): at 12:26

## 2023-04-25 RX ADMIN — Medication 0.1 MILLIGRAM(S): at 17:22

## 2023-04-25 RX ADMIN — Medication 75 MILLIGRAM(S): at 21:58

## 2023-04-25 RX ADMIN — Medication 3: at 08:07

## 2023-04-25 RX ADMIN — Medication 50 MILLIGRAM(S): at 05:29

## 2023-04-25 RX ADMIN — GABAPENTIN 300 MILLIGRAM(S): 400 CAPSULE ORAL at 17:22

## 2023-04-25 RX ADMIN — Medication 0.1 MILLIGRAM(S): at 05:11

## 2023-04-25 RX ADMIN — Medication 2: at 17:22

## 2023-04-25 RX ADMIN — Medication 2 UNIT(S): at 17:21

## 2023-04-25 RX ADMIN — PIPERACILLIN AND TAZOBACTAM 200 GRAM(S): 4; .5 INJECTION, POWDER, LYOPHILIZED, FOR SOLUTION INTRAVENOUS at 00:25

## 2023-04-25 RX ADMIN — PIPERACILLIN AND TAZOBACTAM 25 GRAM(S): 4; .5 INJECTION, POWDER, LYOPHILIZED, FOR SOLUTION INTRAVENOUS at 05:11

## 2023-04-25 RX ADMIN — PIPERACILLIN AND TAZOBACTAM 25 GRAM(S): 4; .5 INJECTION, POWDER, LYOPHILIZED, FOR SOLUTION INTRAVENOUS at 21:58

## 2023-04-25 RX ADMIN — Medication 50 MILLIGRAM(S): at 17:23

## 2023-04-25 RX ADMIN — ATORVASTATIN CALCIUM 20 MILLIGRAM(S): 80 TABLET, FILM COATED ORAL at 21:58

## 2023-04-25 RX ADMIN — PANTOPRAZOLE SODIUM 40 MILLIGRAM(S): 20 TABLET, DELAYED RELEASE ORAL at 12:27

## 2023-04-25 RX ADMIN — Medication 1 GRAM(S): at 17:22

## 2023-04-25 RX ADMIN — PIPERACILLIN AND TAZOBACTAM 25 GRAM(S): 4; .5 INJECTION, POWDER, LYOPHILIZED, FOR SOLUTION INTRAVENOUS at 14:34

## 2023-04-25 RX ADMIN — GABAPENTIN 300 MILLIGRAM(S): 400 CAPSULE ORAL at 05:11

## 2023-04-25 RX ADMIN — Medication 75 MILLIGRAM(S): at 14:33

## 2023-04-25 NOTE — PROGRESS NOTE ADULT - PROBLEM SELECTOR PLAN 2
New onset this admission. DARRELL-Vasc 5. Rates up to 140 given IV lopressor 5 x2 rates now 120s.   - TSH wnl  - hemodynamically stable  - c/w metop 50 bid (home dose)  - s/p heparin gtt (d/c 4/8)  - c/w eliquis (started 4/9), Eliquis covered by insurance, can send home with new rx upon discharge   - improved rate control on telemetry - Blood cultures x2 pending  - CT chest to further evaluate groundglass opacities seen on CT abdomen/pelvis  - Started on Zosyn 4/24 overnight  -

## 2023-04-25 NOTE — PROGRESS NOTE ADULT - ATTENDING COMMENTS
Patient is a 72 year old female, with PMH of DM, HTN, CAD s/p stent (2019), recent admission for pericardial effusion, started on high-dose ASA & colchicine for suspected pericarditis, course complicated by c/b new-onset AFib with RVR for which Eliquis was started, presenting for epigastric abdominal pain.     #Epigastric pain   #FARSHAD   #Anemia   #Hyponatremia     - patient presented due to chest/epigastric pain; original concern for colchicine toxicity but less likely; tox recs appreciated; may be due to gastritis vs PUD?  - noted to have drop in Hgb down to 6.6 in ED; given 1 PRBC with improvement to 8.6; unsure if low Hgb level was accurate since Hgb improved more than expected; Hgb stable currently; resume eliquis; change PPI to daily and start carafate; continue to monitor Hgb   - can consider GI consult if noted to have signs of GI bleed and/or Hgb continues to drop   - currently on O2 but does not appear to be in respiratory distress; wean off O2 and monitor  - continue home colchicine; pericardial effusion appears to be resolved based on CT abd findings but will obtain repeat ECHO to ensure full resolution  - opacities noted on CT abd; procal mildly elevated; spiked fever of 101.7 overnight 4/24; started on zosyn; f/u blood cx; obtain CT chest to further evaluate for infectious process    - Cr elevated based on prior labs; s/p IVF but creatinine still elevated; obtain renal US to further evaluate    - DC rutledge and perform TOV    Rest as above. Discussed with HS.

## 2023-04-25 NOTE — PROGRESS NOTE ADULT - PROBLEM SELECTOR PLAN 7
Pt on enalapril 20 bid, clonidine 0.1 bid, hydral 50 bid at home  - BPs have been stably elevated (max systolics to 180s)  - inc hydral 75 tid 4/11  - restarted clonidine 0.1 bid 4/8  - restart enalapril 20mg BID 4/9

## 2023-04-25 NOTE — PROGRESS NOTE ADULT - PROBLEM SELECTOR PLAN 6
Pt on metformin at home. A1c 8.5  - simvastatin -> atorvastatin  - lantus 5, admelog 8 DVT: Eliquis  Diet: Vegetarian, CC/DASH  Dispo: Home with home PT per recent admission.

## 2023-04-25 NOTE — PROVIDER CONTACT NOTE (OTHER) - BACKGROUND
Dx: Hematuria/epigastric pain
pt admitted for hematuria, complaining of epigastric pain. PMH DM, HTN, LISSETT s/p stent (2019), recent admission for pericardial effusion, new onset afib.
Dx: hematuria/epigastric pain

## 2023-04-25 NOTE — PROVIDER CONTACT NOTE (OTHER) - ACTION/TREATMENT ORDERED:
PM hydralazine 75mg given. Ordered to recheck BP in 1 hour.
Resident Etelvina Gorman made aware. CloNIDine ordered STAT. Will continue to monitor.
Blood cultures x2 ordered, IVPB zosyn started and tylenol given. Will recheck vital signs in 1 hour.

## 2023-04-25 NOTE — PROGRESS NOTE ADULT - PROBLEM SELECTOR PLAN 4
Baseline 12-13, now hemoglobin 8.6, may be component of chronic disease with MARQUEZ.   - Ferritin 10, sat 6% on recent outpatient labs.   - started ferrous sulfate  - Hgb stable Continued hematuria with pink tinged urine, creatinine slightly higher at 1.8  - Baseline 1.5-1.7  - UA and UCX with no bacterial growth  - Obtain US kidney

## 2023-04-25 NOTE — PROVIDER CONTACT NOTE (OTHER) - SITUATION
Pt febrile 101.7 F; BP: 194/83
/82
Pt transferred from ED, /77. Unknown small, orange pill found in pts gown.

## 2023-04-25 NOTE — PROGRESS NOTE ADULT - PROBLEM SELECTOR PLAN 5
- c/w ASA 81   - pt was reportedly on Plavix at home. Last stent in 2019, should discontinue - Improved s/p 2L fluid bolus  - Will restart her home clonidine and hydralazine, hold enalapril in the setting of FARSHAD.

## 2023-04-25 NOTE — PROGRESS NOTE ADULT - PROBLEM SELECTOR PLAN 1
Recent viral illness last week? Now with worsening chest pain, concern for pericarditis after recent URI.   - limited TTE on admission shows moderate pericardial effusion with no tamponade physiology  - CRP elevated (286), proBNP elevated (2285). Trops wnl  - s/p  q8h, 4/5-4/6  - c/w colchicine 0.6 q12h  - repeat full TTE 4/6 shows small pericardial effusion, no hemodynamic compromise  - pain control: Tylenol prn, oxy prn for severe pain, topical diclofenac  - cards recs appreciated; no need for urgent drainage per cards given hemodynamic stability  - repeat TTE 4/10 trace pericardial effusion - Toxicology consulted in ED and does not appear to be colchicine toxicity  - CT with patchy opacities in the lungs, possibly referred pain from pneumonia vs. gastritis/PUD iso high dose aspirin  - Procal 0.33  - TTE 4/23 with no pericardial effusion  - CT chest to further evaluate pneumonia  - Start carafate; decrease protonix to 40 qD

## 2023-04-25 NOTE — PROVIDER CONTACT NOTE (OTHER) - ASSESSMENT
Pt A&Ox4, on 2L NC, all other VSS.
Pt denies chest pain, SOB, chills, headaches, dizziness, palpitations. VS charted in flowsheet.
Pt aox4, VSS otherwise see flowsheet. Pt asymptomatic; denies chest pain, palpitations, SOB, headaches. NAD.

## 2023-04-25 NOTE — PROGRESS NOTE ADULT - ASSESSMENT
72F hx DM, HTN, CAD s/p stent (2019), admitted with worsening pleuritic CP x3 days, fevers x1 week, found to have large pericardial effusion on CT chest started on ASA/Colchicine for suspected pericarditis with course c/b new onset AFib RVR.

## 2023-04-25 NOTE — PROGRESS NOTE ADULT - PROBLEM SELECTOR PLAN 3
SCr baseline 0.3-0.5. SCr on admission 1.10. Likely prerenal in setting of poor PO intake  - FeNa 0.8% c/w prerenal etiology  - SCr 0.97 today - downtrending  - ctm while on NSAIDs. Pt also did receive contrast for CTA Stabilized  - Will restart Eliquis  - CTM

## 2023-04-26 ENCOUNTER — TRANSCRIPTION ENCOUNTER (OUTPATIENT)
Age: 73
End: 2023-04-26

## 2023-04-26 LAB
ANION GAP SERPL CALC-SCNC: 13 MMOL/L — SIGNIFICANT CHANGE UP (ref 5–17)
BUN SERPL-MCNC: 16 MG/DL — SIGNIFICANT CHANGE UP (ref 7–23)
CALCIUM SERPL-MCNC: 8.5 MG/DL — SIGNIFICANT CHANGE UP (ref 8.4–10.5)
CHLORIDE SERPL-SCNC: 99 MMOL/L — SIGNIFICANT CHANGE UP (ref 96–108)
CO2 SERPL-SCNC: 23 MMOL/L — SIGNIFICANT CHANGE UP (ref 22–31)
CREAT SERPL-MCNC: 1.73 MG/DL — HIGH (ref 0.5–1.3)
EGFR: 31 ML/MIN/1.73M2 — LOW
GLUCOSE BLDC GLUCOMTR-MCNC: 175 MG/DL — HIGH (ref 70–99)
GLUCOSE BLDC GLUCOMTR-MCNC: 189 MG/DL — HIGH (ref 70–99)
GLUCOSE BLDC GLUCOMTR-MCNC: 292 MG/DL — HIGH (ref 70–99)
GLUCOSE BLDC GLUCOMTR-MCNC: 320 MG/DL — HIGH (ref 70–99)
GLUCOSE SERPL-MCNC: 140 MG/DL — HIGH (ref 70–99)
HCT VFR BLD CALC: 26.3 % — LOW (ref 34.5–45)
HGB BLD-MCNC: 8.2 G/DL — LOW (ref 11.5–15.5)
MAGNESIUM SERPL-MCNC: 1.7 MG/DL — SIGNIFICANT CHANGE UP (ref 1.6–2.6)
MCHC RBC-ENTMCNC: 25.4 PG — LOW (ref 27–34)
MCHC RBC-ENTMCNC: 31.2 GM/DL — LOW (ref 32–36)
MCV RBC AUTO: 81.4 FL — SIGNIFICANT CHANGE UP (ref 80–100)
NRBC # BLD: 0 /100 WBCS — SIGNIFICANT CHANGE UP (ref 0–0)
PHOSPHATE SERPL-MCNC: 2.5 MG/DL — SIGNIFICANT CHANGE UP (ref 2.5–4.5)
PLATELET # BLD AUTO: 244 K/UL — SIGNIFICANT CHANGE UP (ref 150–400)
POTASSIUM SERPL-MCNC: 3.8 MMOL/L — SIGNIFICANT CHANGE UP (ref 3.5–5.3)
POTASSIUM SERPL-SCNC: 3.8 MMOL/L — SIGNIFICANT CHANGE UP (ref 3.5–5.3)
RBC # BLD: 3.23 M/UL — LOW (ref 3.8–5.2)
RBC # FLD: 17.5 % — HIGH (ref 10.3–14.5)
SODIUM SERPL-SCNC: 135 MMOL/L — SIGNIFICANT CHANGE UP (ref 135–145)
WBC # BLD: 9.04 K/UL — SIGNIFICANT CHANGE UP (ref 3.8–10.5)
WBC # FLD AUTO: 9.04 K/UL — SIGNIFICANT CHANGE UP (ref 3.8–10.5)

## 2023-04-26 PROCEDURE — 99232 SBSQ HOSP IP/OBS MODERATE 35: CPT | Mod: GC

## 2023-04-26 RX ADMIN — Medication 2 UNIT(S): at 08:20

## 2023-04-26 RX ADMIN — Medication 1 GRAM(S): at 17:01

## 2023-04-26 RX ADMIN — Medication 1: at 08:19

## 2023-04-26 RX ADMIN — Medication 1 GRAM(S): at 01:03

## 2023-04-26 RX ADMIN — PIPERACILLIN AND TAZOBACTAM 25 GRAM(S): 4; .5 INJECTION, POWDER, LYOPHILIZED, FOR SOLUTION INTRAVENOUS at 22:16

## 2023-04-26 RX ADMIN — Medication 3: at 17:00

## 2023-04-26 RX ADMIN — Medication 1: at 12:16

## 2023-04-26 RX ADMIN — Medication 2 UNIT(S): at 12:17

## 2023-04-26 RX ADMIN — ATORVASTATIN CALCIUM 20 MILLIGRAM(S): 80 TABLET, FILM COATED ORAL at 22:16

## 2023-04-26 RX ADMIN — Medication 50 MILLIGRAM(S): at 17:01

## 2023-04-26 RX ADMIN — Medication 1 GRAM(S): at 05:30

## 2023-04-26 RX ADMIN — Medication 0.1 MILLIGRAM(S): at 05:31

## 2023-04-26 RX ADMIN — APIXABAN 2.5 MILLIGRAM(S): 2.5 TABLET, FILM COATED ORAL at 05:31

## 2023-04-26 RX ADMIN — Medication 75 MILLIGRAM(S): at 22:21

## 2023-04-26 RX ADMIN — Medication 75 MILLIGRAM(S): at 12:29

## 2023-04-26 RX ADMIN — Medication 2: at 22:50

## 2023-04-26 RX ADMIN — Medication 0.2 MILLIGRAM(S): at 17:01

## 2023-04-26 RX ADMIN — GABAPENTIN 300 MILLIGRAM(S): 400 CAPSULE ORAL at 17:01

## 2023-04-26 RX ADMIN — GABAPENTIN 300 MILLIGRAM(S): 400 CAPSULE ORAL at 05:31

## 2023-04-26 RX ADMIN — PIPERACILLIN AND TAZOBACTAM 25 GRAM(S): 4; .5 INJECTION, POWDER, LYOPHILIZED, FOR SOLUTION INTRAVENOUS at 12:29

## 2023-04-26 RX ADMIN — PANTOPRAZOLE SODIUM 40 MILLIGRAM(S): 20 TABLET, DELAYED RELEASE ORAL at 12:19

## 2023-04-26 RX ADMIN — APIXABAN 2.5 MILLIGRAM(S): 2.5 TABLET, FILM COATED ORAL at 17:01

## 2023-04-26 RX ADMIN — Medication 0.3 MILLIGRAM(S): at 12:20

## 2023-04-26 RX ADMIN — Medication 1 GRAM(S): at 12:20

## 2023-04-26 RX ADMIN — PIPERACILLIN AND TAZOBACTAM 25 GRAM(S): 4; .5 INJECTION, POWDER, LYOPHILIZED, FOR SOLUTION INTRAVENOUS at 05:31

## 2023-04-26 RX ADMIN — Medication 75 MILLIGRAM(S): at 05:30

## 2023-04-26 RX ADMIN — Medication 2 UNIT(S): at 17:00

## 2023-04-26 RX ADMIN — Medication 50 MILLIGRAM(S): at 05:31

## 2023-04-26 NOTE — DISCHARGE NOTE PROVIDER - ATTENDING DISCHARGE PHYSICAL EXAMINATION:
Patient seen and examined. Feeling well overall. No new complaints.   Vital Signs Last 24 Hrs  T(C): 36.8 (28 Apr 2023 12:25), Max: 36.9 (27 Apr 2023 20:00)  T(F): 98.2 (28 Apr 2023 12:25), Max: 98.5 (27 Apr 2023 20:00)  HR: 84 (28 Apr 2023 12:25) (62 - 84)  BP: 147/77 (28 Apr 2023 12:25) (147/77 - 167/78)  RR: 18 (28 Apr 2023 12:25) (18 - 18)  SpO2: 94% (28 Apr 2023 12:25) (92% - 96%)    Parameters below as of 28 Apr 2023 12:25  Patient On (Oxygen Delivery Method): room air     CONSTITUTIONAL: NAD; well-groomed  EYES: PERRLA; conjunctiva and sclera clear  ENMT: Moist oral mucosa, no pharyngeal injection or exudates   NECK: Supple   RESPIRATORY: Normal respiratory effort; lungs are clear to auscultation bilaterally  CARDIOVASCULAR: Regular rate and rhythm, normal S1 and S2, no murmur   ABDOMEN: Nontender to palpation, normoactive bowel sounds   MUSCULOSKELETAL: no clubbing or cyanosis of digits; no joint swelling or tenderness to palpation  PSYCH: A+O to person, place, and time; affect appropriate  NEUROLOGY: no gross sensory deficits   SKIN: No rashes

## 2023-04-26 NOTE — DISCHARGE NOTE PROVIDER - NSDCFUSCHEDAPPT_GEN_ALL_CORE_FT
Nick Pearce  [facility]ician [facility]  CARDIOLOGY [address]  Scheduled Appointment: 05/02/2023

## 2023-04-26 NOTE — DISCHARGE NOTE PROVIDER - NSDCCPTREATMENT_GEN_ALL_CORE_FT
PRINCIPAL PROCEDURE  Procedure: CT chest wo con  Findings and Treatment: IMPRESSION:  Mid to upper lung zone predominant ground-glass opacities that are new   since 4/5/2023 may be infectious or inflammatory in etiology.  CT chest follow-up in 3 months recommended to ensure clearing.  --- End ofReport ---        SECONDARY PROCEDURE  Procedure: CT abdomen pelvis  Findings and Treatment: IMPRESSION:  Patchy poorly marginated parenchymal hypoattenuation present within the   bilateral kidneys, right greater than left, with subtle loss of   corticomedullary differentiation on the right. Findings may represent   underlying early pyelonephritis. Findings are limited by mild motion   artifact in this region. No abscess or perinephric fluid collections are   identified.  New partially visualized patchy opacities in the right middle and left   lower lobes which may be infectious or inflammatory.  Resolved pericardial effusion compared to prior exam from 4/5/2023.

## 2023-04-26 NOTE — PROGRESS NOTE ADULT - PROBLEM SELECTOR PLAN 1
- Toxicology consulted in ED and does not appear to be colchicine toxicity  - CT with patchy opacities in the lungs, possibly referred pain from pneumonia vs. gastritis/PUD iso high dose aspirin  - Procal 0.33  - TTE 4/23 with no pericardial effusion  - CT chest to further evaluate pneumonia  - Start carafate; decrease protonix to 40 qD - Toxicology consulted in ED and does not appear to be colchicine toxicity  - CT with patchy opacities in the lungs, possibly referred pain from pneumonia vs. gastritis/PUD iso high dose aspirin  - Procal 0.33  - TTE 4/23 with no pericardial effusion  - Start carafate; decrease protonix to 40 qD

## 2023-04-26 NOTE — DISCHARGE NOTE PROVIDER - NSDCCPCAREPLAN_GEN_ALL_CORE_FT
PRINCIPAL DISCHARGE DIAGNOSIS  Diagnosis: Pneumonia  Assessment and Plan of Treatment:       SECONDARY DISCHARGE DIAGNOSES  Diagnosis: FARSHAD (acute kidney injury)  Assessment and Plan of Treatment:      PRINCIPAL DISCHARGE DIAGNOSIS  Diagnosis: Pneumonia  Assessment and Plan of Treatment: You came in with chest pain, so we made sure your heart was functioning adequately with blood work and did an echo (ultrasound) of your heart which showed that the fluid around the heart is gone. You should stop taking your aspirin and colchicine, as this may have been irritating your gut and causing the stomach pain. You can continue to take the carafate as needed for pain. We did a CT of your chest/abdomen/pelvis and found that the source of the pain is likely a pneumonia in both your lungs. We gave you antibiotics and saw that your breathing improved so that you no longer need oxygen supplementation. You will continue to take levaquin (an oral antibiotic) every other day for 2 doses to ensure complete treatment of the pneumonia.      SECONDARY DISCHARGE DIAGNOSES  Diagnosis: FARSHAD (acute kidney injury)  Assessment and Plan of Treatment: Your kidney function is worse than last time you were in the hospital, but was stable while you were here. You can restart your home blood pressure medications and please make sure to follow up closely with a new primary care doctor when you go to [place].

## 2023-04-26 NOTE — DISCHARGE NOTE PROVIDER - HOSPITAL COURSE
Ms. Us is a 73 YO woman with PMH of DM, HTN, CAD s/p stent (2019), admitted with worsening pleuritic CP x3 days, fevers x1 week, found to have large pericardial effusion on CT chest started on ASA/Colchicine for suspected pericarditis with course c/b new onset AFib with RVR started on Eliquis, presenting for epigastric pain and chest pain, with hypoxia requiring 2L NC supplementation.    Cardiac enzymes and ECG were not concerning for ACS. Echo showed no residual pericardial effusion. CT abdomen/pelvis showed GGOs in bilateral lungs, and possible pyelonephritis vs. motion artifact of the kidneys. Celestin was placed in ED, patient found to have hematuria without bacteriuria. She became febrile and was started on Zosyn empirically. CT chest was done which confirmed GGOs in bilateral lungs with upper lobe predominance. She improved clinically and was able to be weaned off oxygen. Celestin was removed and patient began voiding adequately.     Her hemoglobin dropped to 6.6 from 8.5 on admission, and she was transfused 1 unit PRBCs with appropriate response. Her hemoglobin remained stable for the rest of the hospital course. She was started on protonix and carafate for presumed gastric irritation iso high dose aspirin use for pericarditis.     On the day of discharge, patient is hemodynamically stable and medically optimized for discharge to home. She will follow up with her PCP as outpatient.

## 2023-04-26 NOTE — PROGRESS NOTE ADULT - ASSESSMENT
72F hx DM, HTN, CAD s/p stent (2019), admitted with worsening pleuritic CP x3 days, fevers x1 week, found to have large pericardial effusion on CT chest started on ASA/Colchicine for suspected pericarditis with course c/b new onset AFib RVR. 72F hx DM, HTN, CAD s/p stent (2019), admitted with worsening pleuritic CP x3 days, fevers x1 week, found to have large pericardial effusion on CT chest started on ASA/Colchicine for suspected pericarditis with course c/b new onset AFib RVR, presenting for epigastric pain and chest pain, found to have b/l pneumonia, started on Zosyn.

## 2023-04-26 NOTE — DISCHARGE NOTE PROVIDER - CARE PROVIDER_API CALL
Carol Kitchen)  Internal Medicine  [address], [address]  [place] [address]  Phone: [phone]  Fax: [phone]  Established Patient  Follow Up Time: 2 weeks

## 2023-04-26 NOTE — PROGRESS NOTE ADULT - SUBJECTIVE AND OBJECTIVE BOX
Patient is a 72y old  Female who presents with a chief complaint of Epigastric and chest pain (25 Apr 2023 07:16)      SUBJECTIVE / OVERNIGHT EVENTS:    MEDICATIONS  (STANDING):  apixaban 2.5 milliGRAM(s) Oral every 12 hours  atorvastatin 20 milliGRAM(s) Oral at bedtime  cloNIDine 0.1 milliGRAM(s) Oral two times a day  colchicine 0.3 milliGRAM(s) Oral daily  dextrose 5%. 1000 milliLiter(s) (100 mL/Hr) IV Continuous <Continuous>  dextrose 5%. 1000 milliLiter(s) (50 mL/Hr) IV Continuous <Continuous>  dextrose 50% Injectable 12.5 Gram(s) IV Push once  dextrose 50% Injectable 25 Gram(s) IV Push once  dextrose 50% Injectable 25 Gram(s) IV Push once  gabapentin 300 milliGRAM(s) Oral two times a day  glucagon  Injectable 1 milliGRAM(s) IntraMuscular once  hydrALAZINE 75 milliGRAM(s) Oral three times a day  insulin lispro (ADMELOG) corrective regimen sliding scale   SubCutaneous three times a day before meals  insulin lispro (ADMELOG) corrective regimen sliding scale   SubCutaneous at bedtime  insulin lispro Injectable (ADMELOG) 2 Unit(s) SubCutaneous three times a day before meals  metoprolol tartrate 50 milliGRAM(s) Oral two times a day  pantoprazole  Injectable 40 milliGRAM(s) IV Push daily  piperacillin/tazobactam IVPB.. 3.375 Gram(s) IV Intermittent every 8 hours  sucralfate 1 Gram(s) Oral four times a day    MEDICATIONS  (PRN):  acetaminophen     Tablet .. 650 milliGRAM(s) Oral every 6 hours PRN Temp greater or equal to 38C (100.4F), Mild Pain (1 - 3)  aluminum hydroxide/magnesium hydroxide/simethicone Suspension 30 milliLiter(s) Oral every 4 hours PRN Dyspepsia  dextrose Oral Gel 15 Gram(s) Oral once PRN Blood Glucose LESS THAN 70 milliGRAM(s)/deciliter  melatonin 3 milliGRAM(s) Oral at bedtime PRN Insomnia      CAPILLARY BLOOD GLUCOSE      POCT Blood Glucose.: 191 mg/dL (25 Apr 2023 22:39)  POCT Blood Glucose.: 210 mg/dL (25 Apr 2023 17:08)  POCT Blood Glucose.: 256 mg/dL (25 Apr 2023 12:04)  POCT Blood Glucose.: 260 mg/dL (25 Apr 2023 07:49)    I&O's Summary    25 Apr 2023 07:01  -  26 Apr 2023 07:00  --------------------------------------------------------  IN: 610 mL / OUT: 2550 mL / NET: -1940 mL        Vital Signs Last 24 Hrs  T(C): 36.8 (26 Apr 2023 05:20), Max: 37.6 (25 Apr 2023 17:21)  T(F): 98.2 (26 Apr 2023 05:20), Max: 99.6 (25 Apr 2023 17:21)  HR: 71 (26 Apr 2023 05:20) (67 - 83)  BP: 173/81 (26 Apr 2023 05:20) (160/81 - 186/72)  BP(mean): --  RR: 18 (26 Apr 2023 05:20) (18 - 18)  SpO2: 97% (26 Apr 2023 05:20) (91% - 98%)    Parameters below as of 26 Apr 2023 05:20  Patient On (Oxygen Delivery Method): nasal cannula  O2 Flow (L/min): 2      PHYSICAL EXAM:  GENERAL: Sitting in bed in no acute distress, wearing nasal cannula 2 L  EYES: Conjunctiva noninjected or pale, sclera anicteric  HENT: NC/AT, moist mucous membranes  NECK: Supple, trachea midline  LUNG: Nonlabored respirations, no wheezes, rales  CV: RRR, Pulses- Radial: 2+ b/l  ABDOMEN: Nondistended, nontender  MSK: No visible deformities, nontender extremities  SKIN: No rashes, bruises  NEURO: AAOx4 (to person, place, time, event), no tremor, steady gait  PSYCH: Normal mood and affect    LABS:                        8.2    9.04  )-----------( 244      ( 26 Apr 2023 06:36 )             26.3      04-25    135  |  100  |  16  ----------------------------<  169<H>  3.3<L>   |  24  |  1.80<H>    Ca    8.1<L>      25 Apr 2023 05:57  Phos  3.0     04-25  Mg     1.6     04-25                RADIOLOGY & ADDITIONAL TESTS:    Imaging Personally Reviewed:    Consultant(s) Notes Reviewed:      Care Discussed with Consultants/Other Providers:   Patient is a 72y old  Female who presents with a chief complaint of Epigastric and chest pain (25 Apr 2023 07:16)      SUBJECTIVE / OVERNIGHT EVENTS: No acute events overnight. Patient seen and examined at bedside, feels overall improved, still feels intermittently short of breath especially off oxygen. Still with epigastric pain after eating. Chest pain improved. Had a bowel movement, urine has now become clear.     MEDICATIONS  (STANDING):  apixaban 2.5 milliGRAM(s) Oral every 12 hours  atorvastatin 20 milliGRAM(s) Oral at bedtime  cloNIDine 0.1 milliGRAM(s) Oral two times a day  colchicine 0.3 milliGRAM(s) Oral daily  dextrose 5%. 1000 milliLiter(s) (100 mL/Hr) IV Continuous <Continuous>  dextrose 5%. 1000 milliLiter(s) (50 mL/Hr) IV Continuous <Continuous>  dextrose 50% Injectable 12.5 Gram(s) IV Push once  dextrose 50% Injectable 25 Gram(s) IV Push once  dextrose 50% Injectable 25 Gram(s) IV Push once  gabapentin 300 milliGRAM(s) Oral two times a day  glucagon  Injectable 1 milliGRAM(s) IntraMuscular once  hydrALAZINE 75 milliGRAM(s) Oral three times a day  insulin lispro (ADMELOG) corrective regimen sliding scale   SubCutaneous three times a day before meals  insulin lispro (ADMELOG) corrective regimen sliding scale   SubCutaneous at bedtime  insulin lispro Injectable (ADMELOG) 2 Unit(s) SubCutaneous three times a day before meals  metoprolol tartrate 50 milliGRAM(s) Oral two times a day  pantoprazole  Injectable 40 milliGRAM(s) IV Push daily  piperacillin/tazobactam IVPB.. 3.375 Gram(s) IV Intermittent every 8 hours  sucralfate 1 Gram(s) Oral four times a day    MEDICATIONS  (PRN):  acetaminophen     Tablet .. 650 milliGRAM(s) Oral every 6 hours PRN Temp greater or equal to 38C (100.4F), Mild Pain (1 - 3)  aluminum hydroxide/magnesium hydroxide/simethicone Suspension 30 milliLiter(s) Oral every 4 hours PRN Dyspepsia  dextrose Oral Gel 15 Gram(s) Oral once PRN Blood Glucose LESS THAN 70 milliGRAM(s)/deciliter  melatonin 3 milliGRAM(s) Oral at bedtime PRN Insomnia      CAPILLARY BLOOD GLUCOSE      POCT Blood Glucose.: 191 mg/dL (25 Apr 2023 22:39)  POCT Blood Glucose.: 210 mg/dL (25 Apr 2023 17:08)  POCT Blood Glucose.: 256 mg/dL (25 Apr 2023 12:04)  POCT Blood Glucose.: 260 mg/dL (25 Apr 2023 07:49)    I&O's Summary    25 Apr 2023 07:01  -  26 Apr 2023 07:00  --------------------------------------------------------  IN: 610 mL / OUT: 2550 mL / NET: -1940 mL        Vital Signs Last 24 Hrs  T(C): 36.8 (26 Apr 2023 05:20), Max: 37.6 (25 Apr 2023 17:21)  T(F): 98.2 (26 Apr 2023 05:20), Max: 99.6 (25 Apr 2023 17:21)  HR: 71 (26 Apr 2023 05:20) (67 - 83)  BP: 173/81 (26 Apr 2023 05:20) (160/81 - 186/72)  BP(mean): --  RR: 18 (26 Apr 2023 05:20) (18 - 18)  SpO2: 97% (26 Apr 2023 05:20) (91% - 98%)    Parameters below as of 26 Apr 2023 05:20  Patient On (Oxygen Delivery Method): nasal cannula  O2 Flow (L/min): 2      PHYSICAL EXAM:  GENERAL: Sitting in bed in no acute distress, wearing nasal cannula 2 L  EYES: Conjunctiva noninjected or pale, sclera anicteric  HENT: NC/AT, moist mucous membranes  NECK: Supple, trachea midline  LUNG: Nonlabored respirations, no wheezes, rales  CV: RRR, Pulses- Radial: 2+ b/l  ABDOMEN: Nondistended, nontender  MSK: No visible deformities, nontender extremities  SKIN: No rashes, bruises  NEURO: AAOx4 (to person, place, time, event), no tremor  PSYCH: Normal mood and affect    LABS:                        8.2    9.04  )-----------( 244      ( 26 Apr 2023 06:36 )             26.3      04-25    135  |  100  |  16  ----------------------------<  169<H>  3.3<L>   |  24  |  1.80<H>    Ca    8.1<L>      25 Apr 2023 05:57  Phos  3.0     04-25  Mg     1.6     04-25                RADIOLOGY & ADDITIONAL TESTS:    Imaging Personally Reviewed:    Consultant(s) Notes Reviewed:      Care Discussed with Consultants/Other Providers:

## 2023-04-26 NOTE — PROGRESS NOTE ADULT - PROBLEM SELECTOR PLAN 4
Continued hematuria with pink tinged urine, creatinine slightly higher at 1.8  - Baseline 1.5-1.7  - UA and UCX with no bacterial growth  - Obtain US kidney

## 2023-04-26 NOTE — DISCHARGE NOTE PROVIDER - NSDCMRMEDTOKEN_GEN_ALL_CORE_FT
apixaban 5 mg oral tablet: 1 tab(s) orally every 12 hours  aspirin 81 mg oral delayed release tablet: 1 tab(s) orally once a day  atorvastatin 20 mg oral tablet: 1 tab(s) orally once a day (at bedtime)  cloNIDine 0.1 mg oral tablet: 1 tab(s) orally 2 times a day  colchicine 0.6 mg oral tablet: 1 tab(s) orally once a day  enalapril 20 mg oral tablet: 1 tab(s) orally 2 times a day  gabapentin 300 mg oral capsule: 1 cap(s) orally 3 times a day  hydrALAZINE 25 mg oral tablet: 3 tab(s) orally 3 times a day  metFORMIN 500 mg oral tablet: 2 tab(s) orally 2 times a day  metoprolol tartrate 50 mg oral tablet: 1 tab(s) orally 2 times a day  pantoprazole 40 mg oral delayed release tablet: 1 tab(s) orally once a day   apixaban 5 mg oral tablet: 1 tab(s) orally every 12 hours  atorvastatin 20 mg oral tablet: 1 tab(s) orally once a day (at bedtime)  cloNIDine 0.1 mg oral tablet: 1 tab(s) orally 2 times a day  enalapril 20 mg oral tablet: 1 tab(s) orally 2 times a day  gabapentin 300 mg oral capsule: 1 cap(s) orally 3 times a day  hydrALAZINE 25 mg oral tablet: 3 tab(s) orally 3 times a day  levoFLOXacin 750 mg oral tablet: 1 tab(s) orally every other day Take on 4/29 and 5/1.  metFORMIN 500 mg oral tablet: 1 tab(s) orally 2 times a day  metoprolol tartrate 50 mg oral tablet: 1 tab(s) orally 2 times a day  pantoprazole 40 mg oral delayed release tablet: 1 tab(s) orally once a day  sucralfate 1 g oral tablet: 1 tab(s) orally 4 times a day  Tradjenta 5 mg oral tablet: 1 tab(s) orally once a day

## 2023-04-26 NOTE — PROGRESS NOTE ADULT - PROBLEM SELECTOR PLAN 5
- Improved s/p 2L fluid bolus  - Will restart her home clonidine and hydralazine, hold enalapril in the setting of FARSHAD.

## 2023-04-26 NOTE — PROGRESS NOTE ADULT - PROBLEM SELECTOR PLAN 2
- Blood cultures x2 pending  - CT chest to further evaluate groundglass opacities seen on CT abdomen/pelvis  - Started on Zosyn 4/24 overnight  - - Blood cultures x2 NGTD  - CT chest with bilateral upper lobe predominant GGOs  - Started on Zosyn 4/24 overnight  - Consider transitioning to oral medication if continued improvement by 4/27  - Wean O2 - not previously on home O2

## 2023-04-27 DIAGNOSIS — E78.5 HYPERLIPIDEMIA, UNSPECIFIED: ICD-10-CM

## 2023-04-27 DIAGNOSIS — E11.65 TYPE 2 DIABETES MELLITUS WITH HYPERGLYCEMIA: ICD-10-CM

## 2023-04-27 LAB
ANION GAP SERPL CALC-SCNC: 12 MMOL/L — SIGNIFICANT CHANGE UP (ref 5–17)
BUN SERPL-MCNC: 18 MG/DL — SIGNIFICANT CHANGE UP (ref 7–23)
CALCIUM SERPL-MCNC: 8.7 MG/DL — SIGNIFICANT CHANGE UP (ref 8.4–10.5)
CHLORIDE SERPL-SCNC: 99 MMOL/L — SIGNIFICANT CHANGE UP (ref 96–108)
CO2 SERPL-SCNC: 24 MMOL/L — SIGNIFICANT CHANGE UP (ref 22–31)
CREAT SERPL-MCNC: 1.79 MG/DL — HIGH (ref 0.5–1.3)
EGFR: 30 ML/MIN/1.73M2 — LOW
GLUCOSE BLDC GLUCOMTR-MCNC: 151 MG/DL — HIGH (ref 70–99)
GLUCOSE BLDC GLUCOMTR-MCNC: 195 MG/DL — HIGH (ref 70–99)
GLUCOSE BLDC GLUCOMTR-MCNC: 231 MG/DL — HIGH (ref 70–99)
GLUCOSE BLDC GLUCOMTR-MCNC: 238 MG/DL — HIGH (ref 70–99)
GLUCOSE SERPL-MCNC: 156 MG/DL — HIGH (ref 70–99)
HCT VFR BLD CALC: 27.8 % — LOW (ref 34.5–45)
HGB BLD-MCNC: 8.5 G/DL — LOW (ref 11.5–15.5)
MAGNESIUM SERPL-MCNC: 1.8 MG/DL — SIGNIFICANT CHANGE UP (ref 1.6–2.6)
MCHC RBC-ENTMCNC: 25 PG — LOW (ref 27–34)
MCHC RBC-ENTMCNC: 30.6 GM/DL — LOW (ref 32–36)
MCV RBC AUTO: 81.8 FL — SIGNIFICANT CHANGE UP (ref 80–100)
NRBC # BLD: 0 /100 WBCS — SIGNIFICANT CHANGE UP (ref 0–0)
PHOSPHATE SERPL-MCNC: 2.6 MG/DL — SIGNIFICANT CHANGE UP (ref 2.5–4.5)
PLATELET # BLD AUTO: 273 K/UL — SIGNIFICANT CHANGE UP (ref 150–400)
POTASSIUM SERPL-MCNC: 3.6 MMOL/L — SIGNIFICANT CHANGE UP (ref 3.5–5.3)
POTASSIUM SERPL-SCNC: 3.6 MMOL/L — SIGNIFICANT CHANGE UP (ref 3.5–5.3)
RBC # BLD: 3.4 M/UL — LOW (ref 3.8–5.2)
RBC # FLD: 17.7 % — HIGH (ref 10.3–14.5)
SODIUM SERPL-SCNC: 135 MMOL/L — SIGNIFICANT CHANGE UP (ref 135–145)
WBC # BLD: 9.09 K/UL — SIGNIFICANT CHANGE UP (ref 3.8–10.5)
WBC # FLD AUTO: 9.09 K/UL — SIGNIFICANT CHANGE UP (ref 3.8–10.5)

## 2023-04-27 PROCEDURE — 99222 1ST HOSP IP/OBS MODERATE 55: CPT

## 2023-04-27 PROCEDURE — 99232 SBSQ HOSP IP/OBS MODERATE 35: CPT | Mod: GC

## 2023-04-27 RX ORDER — INSULIN LISPRO 100/ML
5 VIAL (ML) SUBCUTANEOUS
Refills: 0 | Status: DISCONTINUED | OUTPATIENT
Start: 2023-04-27 | End: 2023-04-28

## 2023-04-27 RX ORDER — INSULIN GLARGINE 100 [IU]/ML
3 INJECTION, SOLUTION SUBCUTANEOUS AT BEDTIME
Refills: 0 | Status: DISCONTINUED | OUTPATIENT
Start: 2023-04-27 | End: 2023-04-27

## 2023-04-27 RX ORDER — INSULIN LISPRO 100/ML
3 VIAL (ML) SUBCUTANEOUS
Refills: 0 | Status: DISCONTINUED | OUTPATIENT
Start: 2023-04-27 | End: 2023-04-27

## 2023-04-27 RX ADMIN — Medication 0.2 MILLIGRAM(S): at 17:09

## 2023-04-27 RX ADMIN — Medication 3 UNIT(S): at 12:26

## 2023-04-27 RX ADMIN — Medication 50 MILLIGRAM(S): at 05:13

## 2023-04-27 RX ADMIN — Medication 0.2 MILLIGRAM(S): at 05:14

## 2023-04-27 RX ADMIN — APIXABAN 2.5 MILLIGRAM(S): 2.5 TABLET, FILM COATED ORAL at 17:09

## 2023-04-27 RX ADMIN — ATORVASTATIN CALCIUM 20 MILLIGRAM(S): 80 TABLET, FILM COATED ORAL at 21:32

## 2023-04-27 RX ADMIN — Medication 0.3 MILLIGRAM(S): at 12:23

## 2023-04-27 RX ADMIN — Medication 1: at 12:28

## 2023-04-27 RX ADMIN — Medication 1 GRAM(S): at 12:28

## 2023-04-27 RX ADMIN — Medication 1 GRAM(S): at 00:30

## 2023-04-27 RX ADMIN — Medication 75 MILLIGRAM(S): at 05:15

## 2023-04-27 RX ADMIN — Medication 50 MILLIGRAM(S): at 17:10

## 2023-04-27 RX ADMIN — PIPERACILLIN AND TAZOBACTAM 25 GRAM(S): 4; .5 INJECTION, POWDER, LYOPHILIZED, FOR SOLUTION INTRAVENOUS at 14:07

## 2023-04-27 RX ADMIN — Medication 2 UNIT(S): at 08:33

## 2023-04-27 RX ADMIN — Medication 5 UNIT(S): at 17:11

## 2023-04-27 RX ADMIN — Medication 1: at 17:11

## 2023-04-27 RX ADMIN — Medication 2: at 08:34

## 2023-04-27 RX ADMIN — Medication 1 GRAM(S): at 05:13

## 2023-04-27 RX ADMIN — PIPERACILLIN AND TAZOBACTAM 25 GRAM(S): 4; .5 INJECTION, POWDER, LYOPHILIZED, FOR SOLUTION INTRAVENOUS at 21:33

## 2023-04-27 RX ADMIN — PIPERACILLIN AND TAZOBACTAM 25 GRAM(S): 4; .5 INJECTION, POWDER, LYOPHILIZED, FOR SOLUTION INTRAVENOUS at 05:15

## 2023-04-27 RX ADMIN — PANTOPRAZOLE SODIUM 40 MILLIGRAM(S): 20 TABLET, DELAYED RELEASE ORAL at 12:24

## 2023-04-27 RX ADMIN — GABAPENTIN 300 MILLIGRAM(S): 400 CAPSULE ORAL at 17:10

## 2023-04-27 RX ADMIN — Medication 1 GRAM(S): at 17:09

## 2023-04-27 RX ADMIN — GABAPENTIN 300 MILLIGRAM(S): 400 CAPSULE ORAL at 05:13

## 2023-04-27 RX ADMIN — Medication 75 MILLIGRAM(S): at 21:32

## 2023-04-27 RX ADMIN — APIXABAN 2.5 MILLIGRAM(S): 2.5 TABLET, FILM COATED ORAL at 05:13

## 2023-04-27 NOTE — PROGRESS NOTE ADULT - PROBLEM SELECTOR PLAN 1
- Toxicology consulted in ED and does not appear to be colchicine toxicity  - CT with patchy opacities in the lungs, possibly referred pain from pneumonia vs. gastritis/PUD iso high dose aspirin  - Procal 0.33  - TTE 4/23 with no pericardial effusion  - Start carafate; decrease protonix to 40 qD - Toxicology consulted in ED and does not appear to be colchicine toxicity  - CT with patchy opacities in the lungs, possibly referred pain from pneumonia vs. gastritis/PUD iso high dose aspirin  - Procal 0.33  - TTE 4/23 with no pericardial effusion  - Start carafate; decrease protonix to 40 qD  - Improved

## 2023-04-27 NOTE — PROGRESS NOTE ADULT - ASSESSMENT
72F hx DM, HTN, CAD s/p stent (2019), admitted with worsening pleuritic CP x3 days, fevers x1 week, found to have large pericardial effusion on CT chest started on ASA/Colchicine for suspected pericarditis with course c/b new onset AFib RVR, presenting for epigastric pain and chest pain, found to have b/l pneumonia, started on Zosyn.

## 2023-04-27 NOTE — CONSULT NOTE ADULT - ASSESSMENT
73 yo female with pmhx CAD with stents, HTN, DM, recent pericarditis presenting for epigastric abdominal pain. Abdominal pain of unclear etiology. Uncertain if symptoms related to colchicine use. Colchicine toxicity usually presents with GI symptoms (N/V/D), hypovolemia, hemodynamic instability, early leukocytosis, late leukopenia, and alopecia. Fatalities noted at >0.5mg/kg. Patient currently talk less than therapeutic dose prescribed.    -Recommend medical admission, continue with supportive care, IVF hydration  -Continue exploring alternate etiologies to epigastric abdominal pain    Jay Sherman MD  Toxicology Fellow  
71 y/o F w/ hx of Type 2 DM w/ hyperglycemia complicated by CAD, HTN, HLD admitted with admitted with epigastric abdominal pain and chest pain found to have b/l pneumonia.

## 2023-04-27 NOTE — PROGRESS NOTE ADULT - PROBLEM SELECTOR PLAN 4
Continued hematuria with pink tinged urine, creatinine slightly higher at 1.8  - Baseline 1.5-1.7  - UA and UCX with no bacterial growth  - Obtain US kidney Continued hematuria with pink tinged urine, creatinine ~1.7 during this hospitalization  - Baseline approximately 1.0  - UA and UCX with no bacterial growth  - US kidney with medical renal disease  - Stable, unclear origin

## 2023-04-27 NOTE — PROGRESS NOTE ADULT - SUBJECTIVE AND OBJECTIVE BOX
Patient is a 72y old  Female who presents with a chief complaint of Epigastric and chest pain (26 Apr 2023 17:56)      SUBJECTIVE / OVERNIGHT EVENTS:    MEDICATIONS  (STANDING):  apixaban 2.5 milliGRAM(s) Oral every 12 hours  atorvastatin 20 milliGRAM(s) Oral at bedtime  cloNIDine 0.2 milliGRAM(s) Oral two times a day  colchicine 0.3 milliGRAM(s) Oral daily  dextrose 5%. 1000 milliLiter(s) (50 mL/Hr) IV Continuous <Continuous>  dextrose 5%. 1000 milliLiter(s) (100 mL/Hr) IV Continuous <Continuous>  dextrose 50% Injectable 25 Gram(s) IV Push once  dextrose 50% Injectable 12.5 Gram(s) IV Push once  dextrose 50% Injectable 25 Gram(s) IV Push once  gabapentin 300 milliGRAM(s) Oral two times a day  glucagon  Injectable 1 milliGRAM(s) IntraMuscular once  hydrALAZINE 75 milliGRAM(s) Oral three times a day  insulin lispro (ADMELOG) corrective regimen sliding scale   SubCutaneous at bedtime  insulin lispro (ADMELOG) corrective regimen sliding scale   SubCutaneous three times a day before meals  insulin lispro Injectable (ADMELOG) 2 Unit(s) SubCutaneous three times a day before meals  metoprolol tartrate 50 milliGRAM(s) Oral two times a day  pantoprazole  Injectable 40 milliGRAM(s) IV Push daily  piperacillin/tazobactam IVPB.. 3.375 Gram(s) IV Intermittent every 8 hours  sucralfate 1 Gram(s) Oral four times a day    MEDICATIONS  (PRN):  acetaminophen     Tablet .. 650 milliGRAM(s) Oral every 6 hours PRN Temp greater or equal to 38C (100.4F), Mild Pain (1 - 3)  aluminum hydroxide/magnesium hydroxide/simethicone Suspension 30 milliLiter(s) Oral every 4 hours PRN Dyspepsia  dextrose Oral Gel 15 Gram(s) Oral once PRN Blood Glucose LESS THAN 70 milliGRAM(s)/deciliter  melatonin 3 milliGRAM(s) Oral at bedtime PRN Insomnia      CAPILLARY BLOOD GLUCOSE      POCT Blood Glucose.: 320 mg/dL (26 Apr 2023 22:23)  POCT Blood Glucose.: 292 mg/dL (26 Apr 2023 16:49)  POCT Blood Glucose.: 189 mg/dL (26 Apr 2023 11:58)  POCT Blood Glucose.: 175 mg/dL (26 Apr 2023 08:04)    I&O's Summary    26 Apr 2023 07:01  -  27 Apr 2023 07:00  --------------------------------------------------------  IN: 120 mL / OUT: 0 mL / NET: 120 mL        Vital Signs Last 24 Hrs  T(C): 36.9 (27 Apr 2023 04:53), Max: 36.9 (27 Apr 2023 04:53)  T(F): 98.5 (27 Apr 2023 04:53), Max: 98.5 (27 Apr 2023 04:53)  HR: 69 (27 Apr 2023 04:53) (61 - 69)  BP: 178/84 (27 Apr 2023 04:53) (126/78 - 178/84)  BP(mean): --  RR: 18 (27 Apr 2023 04:53) (18 - 18)  SpO2: 94% (27 Apr 2023 04:53) (93% - 98%)    Parameters below as of 27 Apr 2023 04:53  Patient On (Oxygen Delivery Method): nasal cannula  O2 Flow (L/min): 2      PHYSICAL EXAM:  GENERAL: Sitting in bed in no acute distress, wearing nasal cannula 2 L  EYES: Conjunctiva noninjected or pale, sclera anicteric  HENT: NC/AT, moist mucous membranes  NECK: Supple, trachea midline  LUNG: Nonlabored respirations, no wheezes, rales  CV: RRR, Pulses- Radial: 2+ b/l  ABDOMEN: Nondistended, nontender  MSK: No visible deformities, nontender extremities  SKIN: No rashes, bruises  NEURO: AAOx4 (to person, place, time, event), no tremor  PSYCH: Normal mood and affect    LABS:                        8.5    9.09  )-----------( 273      ( 27 Apr 2023 06:35 )             27.8      04-27    135  |  99  |  18  ----------------------------<  156<H>  3.6   |  24  |  1.79<H>    Ca    8.7      27 Apr 2023 06:35  Phos  2.6     04-27  Mg     1.8     04-27                RADIOLOGY & ADDITIONAL TESTS:    Imaging Personally Reviewed:    Consultant(s) Notes Reviewed:      Care Discussed with Consultants/Other Providers:   Patient is a 72y old  Female who presents with a chief complaint of Epigastric and chest pain (26 Apr 2023 17:56)      SUBJECTIVE / OVERNIGHT EVENTS: This AM desaturated to 90 at 5:30 AM, so was placed on NC until 8 AM, at time of interview back on room air. No shortness of breath or chest pain. Epigastric pain has improved. Urine appears clear.     MEDICATIONS  (STANDING):  apixaban 2.5 milliGRAM(s) Oral every 12 hours  atorvastatin 20 milliGRAM(s) Oral at bedtime  cloNIDine 0.2 milliGRAM(s) Oral two times a day  colchicine 0.3 milliGRAM(s) Oral daily  dextrose 5%. 1000 milliLiter(s) (50 mL/Hr) IV Continuous <Continuous>  dextrose 5%. 1000 milliLiter(s) (100 mL/Hr) IV Continuous <Continuous>  dextrose 50% Injectable 25 Gram(s) IV Push once  dextrose 50% Injectable 12.5 Gram(s) IV Push once  dextrose 50% Injectable 25 Gram(s) IV Push once  gabapentin 300 milliGRAM(s) Oral two times a day  glucagon  Injectable 1 milliGRAM(s) IntraMuscular once  hydrALAZINE 75 milliGRAM(s) Oral three times a day  insulin lispro (ADMELOG) corrective regimen sliding scale   SubCutaneous at bedtime  insulin lispro (ADMELOG) corrective regimen sliding scale   SubCutaneous three times a day before meals  insulin lispro Injectable (ADMELOG) 2 Unit(s) SubCutaneous three times a day before meals  metoprolol tartrate 50 milliGRAM(s) Oral two times a day  pantoprazole  Injectable 40 milliGRAM(s) IV Push daily  piperacillin/tazobactam IVPB.. 3.375 Gram(s) IV Intermittent every 8 hours  sucralfate 1 Gram(s) Oral four times a day    MEDICATIONS  (PRN):  acetaminophen     Tablet .. 650 milliGRAM(s) Oral every 6 hours PRN Temp greater or equal to 38C (100.4F), Mild Pain (1 - 3)  aluminum hydroxide/magnesium hydroxide/simethicone Suspension 30 milliLiter(s) Oral every 4 hours PRN Dyspepsia  dextrose Oral Gel 15 Gram(s) Oral once PRN Blood Glucose LESS THAN 70 milliGRAM(s)/deciliter  melatonin 3 milliGRAM(s) Oral at bedtime PRN Insomnia      CAPILLARY BLOOD GLUCOSE      POCT Blood Glucose.: 320 mg/dL (26 Apr 2023 22:23)  POCT Blood Glucose.: 292 mg/dL (26 Apr 2023 16:49)  POCT Blood Glucose.: 189 mg/dL (26 Apr 2023 11:58)  POCT Blood Glucose.: 175 mg/dL (26 Apr 2023 08:04)    I&O's Summary    26 Apr 2023 07:01  -  27 Apr 2023 07:00  --------------------------------------------------------  IN: 120 mL / OUT: 0 mL / NET: 120 mL        Vital Signs Last 24 Hrs  T(C): 36.9 (27 Apr 2023 04:53), Max: 36.9 (27 Apr 2023 04:53)  T(F): 98.5 (27 Apr 2023 04:53), Max: 98.5 (27 Apr 2023 04:53)  HR: 69 (27 Apr 2023 04:53) (61 - 69)  BP: 178/84 (27 Apr 2023 04:53) (126/78 - 178/84)  BP(mean): --  RR: 18 (27 Apr 2023 04:53) (18 - 18)  SpO2: 94% (27 Apr 2023 04:53) (93% - 98%)    Parameters below as of 27 Apr 2023 04:53  Patient On (Oxygen Delivery Method): nasal cannula  O2 Flow (L/min): 2      PHYSICAL EXAM:  GENERAL: Sitting in bed in no acute distress  EYES: Conjunctiva noninjected or pale, sclera anicteric  HENT: NC/AT, moist mucous membranes  NECK: Supple, trachea midline  LUNG: Nonlabored respirations, no wheezes, rales  CV: RRR, Pulses- Radial: 2+ b/l  ABDOMEN: Nondistended, nontender  MSK: No visible deformities, nontender extremities  SKIN: No rashes, bruises  NEURO: AAOx4 (to person, place, time, event), no tremor  PSYCH: Normal mood and affect    LABS:                        8.5    9.09  )-----------( 273      ( 27 Apr 2023 06:35 )             27.8      04-27    135  |  99  |  18  ----------------------------<  156<H>  3.6   |  24  |  1.79<H>    Ca    8.7      27 Apr 2023 06:35  Phos  2.6     04-27  Mg     1.8     04-27                RADIOLOGY & ADDITIONAL TESTS:    Imaging Personally Reviewed:    Consultant(s) Notes Reviewed:      Care Discussed with Consultants/Other Providers:

## 2023-04-27 NOTE — CONSULT NOTE ADULT - PROBLEM SELECTOR RECOMMENDATION 9
Diabetes Education and Nutrition Eval  Change Zosyn to NS from dextrose.   Discontinue basal insulin. Fasting has been at or close to goal with large drop overnight despite not having received any basal insulin thus far.   Start Admelog 5 units qac  Low correction scale qac + bedtime  Goal glucose 100-180  Outpt. endo follow-up  Outpt. optho, podiatry, nephrology  Plan to d/c on optimized dose of metformin 1000mg BID depending on GFR and renal function. If GFR around 30 would consider lower dose metformin at 500mg BID and add SGLT2. Please see if any SGLT2 inhibitors are covered through [facility] (Invokana, Farxiga, Jardiance).

## 2023-04-27 NOTE — PHYSICAL THERAPY INITIAL EVALUATION ADULT - PERTINENT HX OF CURRENT PROBLEM, REHAB EVAL
73 y/o F w/ PMH of DM, HTN, CAD s/p stent (2019), admitted with worsening pleuritic CP x3 days, fevers x1 week, found to have large pericardial effusion on CT chest started on ASA/Colchicine for suspected pericarditis with course c/b new onset AFib RVR, presenting for epigastric pain and chest pain, found to have b/l pneumonia.

## 2023-04-27 NOTE — PROGRESS NOTE ADULT - PROBLEM SELECTOR PLAN 2
- Blood cultures x2 NGTD  - CT chest with bilateral upper lobe predominant GGOs  - Started on Zosyn 4/24 overnight  - Consider transitioning to oral medication if continued improvement by 4/27  - Wean O2 - not previously on home O2 - Blood cultures x2 NGTD  - CT chest with bilateral upper lobe predominant GGOs  - Started on Zosyn 4/24 overnight  - Consider transitioning to oral medication if continued improvement by 4/27  - Ambulatory sat 91% on room air

## 2023-04-27 NOTE — CONSULT NOTE ADULT - SUBJECTIVE AND OBJECTIVE BOX
MEDICAL TOXICOLOGY CONSULT    HPI:  71 yo female with pmhx CAD with stents, HTN, DM, recent pericarditis presenting for epigastric abdominal pain. Was recently dc'ed on 4/11 on colchicine 0.6mg BID in the setting of pericarditis, which she has only been taking daily. Denies any N/V/D. Presented to ED for persisting symptoms.    /78 HR 88 RR 25 T 98.4F O2 96% RA  BUN 17 Ct 1.77 Troponin 70    ONSET / TIME of exposure(s): daily    QUANTITY of exposure(s): 0.6mg colchicine     ROUTE of exposure:  _ingestion_ (INGESTION, DERMAL, INHALATION, or UNKNOWN)    CONTEXT of exposure: _home__ (at home, found down on street, found wandering by EMS)    ASSOCIATED symptoms: abd pain    PAST MEDICAL & SURGICAL HISTORY:  Hypertension      Hyperlipidemia      Diabetes      CAD (coronary artery disease)      No significant past surgical history          MEDICATION HISTORY:      FAMILY HISTORY:  Family history of cerebrovascular accident (CVA)        REVIEW OF SYSTEMS:   _____unable to perform due to intoxication, dementia, or illness  All systems negative except per HPI.     Vital Signs Last 24 Hrs  T(C): 36.9 (23 Apr 2023 17:40), Max: 37.1 (23 Apr 2023 17:38)  T(F): 98.4 (23 Apr 2023 17:40), Max: 98.8 (23 Apr 2023 17:38)  HR: 88 (23 Apr 2023 17:42) (84 - 88)  BP: 186/78 (23 Apr 2023 17:42) (174/85 - 190/88)  BP(mean): --  RR: 25 (23 Apr 2023 17:42) (18 - 26)  SpO2: 96% (23 Apr 2023 17:42) (88% - 96%)    Parameters below as of 23 Apr 2023 17:42  Patient On (Oxygen Delivery Method): nasal cannula  O2 Flow (L/min): 2      SIGNIFICANT LABORATORY STUDIES:                        8.5    11.41 )-----------( 374      ( 23 Apr 2023 18:04 )             27.9       04-23    131<L>  |  93<L>  |  17  ----------------------------<  289<H>  3.8   |  24  |  1.77<H>    Ca    8.7      23 Apr 2023 18:04    TPro  7.6  /  Alb  3.2<L>  /  TBili  0.3  /  DBili  x   /  AST  23  /  ALT  11  /  AlkPhos  129<H>  04-23      PT/INR - ( 23 Apr 2023 18:04 )   PT: 16.6 sec;   INR: 1.43 ratio         PTT - ( 23 Apr 2023 18:04 )  PTT:33.0 sec      
    HPI:  71 y/o F w/ hx of Type 2 DM x 5 years approximately. No reported microvascular complications. +Macrovascular complication of CAD. At home on metformin 1000mg in the morning and 500mg in the afternoon. Denies being on higher doses. Does not check glucose. No reported hypoglycemia or symptoms of hypoglycemia. Tries to monitor carbs. +recent dry mouth, polyuria and polydipsia. No nausea or vomiting. Mother with Type 2 DM.   Also hx of HTN, CAD s/p stent (2019), recent admission for pericardial effusion, started on high-dose ASA & colchicine for suspected pericarditis, course complicated by c/b new-onset AFib with RVR for which Eliquis was started, presenting for epigastric abdominal pain. She notes that she has both chest pain and epigastric pain. She notes that breathing in worsens her chest pain. She also notes that eating worsens her epigastric pain. She has no changes to her bowel movements, says they are "white," which is regular for her. She has noted her urine appears more brown, but denies pain on urination or change in odor of urination. (24 Apr 2023 07:19)      PAST MEDICAL & SURGICAL HISTORY:  Hypertension      Hyperlipidemia      Diabetes      CAD (coronary artery disease)      No significant past surgical history          FAMILY HISTORY:  Family history of cerebrovascular accident (CVA)  Mother- Type 2 DM       Social History: No tobacco or alcohol use    Outpatient Medications:  · 	metFORMIN 1500 mg daily  · 	colchicine 0.6 mg oral tablet: 1 tab(s) orally every 12 hours  · 	apixaban 5 mg oral tablet: 1 tab(s) orally every 12 hours  · 	atorvastatin 20 mg oral tablet: 1 tab(s) orally once a day (at bedtime)  · 	aspirin 81 mg oral delayed release tablet: 1 tab(s) orally once a day  · 	gabapentin 300 mg oral capsule: 1 cap(s) orally 3 times a day  · 	metoprolol tartrate 50 mg oral tablet: 1 tab(s) orally 2 times a day  · 	pantoprazole 40 mg oral delayed release tablet: 1 tab(s) orally once a day  · 	hydrALAZINE 25 mg oral tablet: 3 tab(s) orally 3 times a day  · 	cloNIDine 0.1 mg oral tablet: 1 tab(s) orally 2 times a day  · 	enalapril 20 mg oral tablet: 1 tab(s) orally 2 times a day    MEDICATIONS  (STANDING):  apixaban 2.5 milliGRAM(s) Oral every 12 hours  atorvastatin 20 milliGRAM(s) Oral at bedtime  cloNIDine 0.2 milliGRAM(s) Oral two times a day  colchicine 0.3 milliGRAM(s) Oral daily  dextrose 5%. 1000 milliLiter(s) (100 mL/Hr) IV Continuous <Continuous>  dextrose 5%. 1000 milliLiter(s) (50 mL/Hr) IV Continuous <Continuous>  dextrose 50% Injectable 12.5 Gram(s) IV Push once  dextrose 50% Injectable 25 Gram(s) IV Push once  dextrose 50% Injectable 25 Gram(s) IV Push once  gabapentin 300 milliGRAM(s) Oral two times a day  glucagon  Injectable 1 milliGRAM(s) IntraMuscular once  hydrALAZINE 75 milliGRAM(s) Oral three times a day  insulin lispro (ADMELOG) corrective regimen sliding scale   SubCutaneous three times a day before meals  insulin lispro (ADMELOG) corrective regimen sliding scale   SubCutaneous at bedtime  insulin lispro Injectable (ADMELOG) 5 Unit(s) SubCutaneous three times a day before meals  metoprolol tartrate 50 milliGRAM(s) Oral two times a day  pantoprazole  Injectable 40 milliGRAM(s) IV Push daily  piperacillin/tazobactam IVPB.. 3.375 Gram(s) IV Intermittent every 8 hours  sucralfate 1 Gram(s) Oral four times a day    MEDICATIONS  (PRN):  acetaminophen     Tablet .. 650 milliGRAM(s) Oral every 6 hours PRN Temp greater or equal to 38C (100.4F), Mild Pain (1 - 3)  aluminum hydroxide/magnesium hydroxide/simethicone Suspension 30 milliLiter(s) Oral every 4 hours PRN Dyspepsia  dextrose Oral Gel 15 Gram(s) Oral once PRN Blood Glucose LESS THAN 70 milliGRAM(s)/deciliter  melatonin 3 milliGRAM(s) Oral at bedtime PRN Insomnia      Allergies    felodipine (Unknown)    Intolerances      Review of Systems:  Constitutional: No fever, No change in weight  Eyes: No blurry vision  Neuro: No headache, No paresthesias  HEENT: No throat pain +dry mouth  Cardiovascular: No chest pain  Respiratory: No SOB  GI: No nausea or vomiting  : +polyuria  Skin: no rash  Psych: no depression  Endocrine: + polydipsia, No heat or cold intolerance, rest as noted in HPI  Hem/lymph: no swelling    All other review of systems negative      PHYSICAL EXAM:  VITALS: T(C): 37.1 (04-27-23 @ 12:34)  T(F): 98.8 (04-27-23 @ 12:34), Max: 98.8 (04-27-23 @ 12:34)  HR: 72 (04-27-23 @ 15:24) (62 - 72)  BP: 166/77 (04-27-23 @ 15:23) (126/78 - 178/84)  RR:  (18 - 18)  SpO2:  (90% - 96%)  Wt(kg): --  GENERAL: NAD at this time  EYES: No proptosis, EOMI  HEENT:  Atraumatic, Normocephalic,   THYROID: Normal size, no palpable nodules  RESPIRATORY: +slight basilar rales, full excursion, non-labored  CARDIOVASCULAR: Regular rhythm; No murmurs; no peripheral edema  GI: Soft, nontender, non distended, normal bowel sounds  SKIN: Dry, intact, No rashes or lesions  MUSCULOSKELETAL: normal strength  NEURO: follows commands  PSYCH: Alert and oriented x 3, normal affect, normal mood  CUSHING'S SIGNS: no striae      POCT Blood Glucose.: 195 mg/dL (04-27-23 @ 12:14)  POCT Blood Glucose.: 231 mg/dL (04-27-23 @ 08:05)  POCT Blood Glucose.: 320 mg/dL (04-26-23 @ 22:23)  POCT Blood Glucose.: 292 mg/dL (04-26-23 @ 16:49)  POCT Blood Glucose.: 189 mg/dL (04-26-23 @ 11:58)  POCT Blood Glucose.: 175 mg/dL (04-26-23 @ 08:04)  POCT Blood Glucose.: 191 mg/dL (04-25-23 @ 22:39)  POCT Blood Glucose.: 210 mg/dL (04-25-23 @ 17:08)  POCT Blood Glucose.: 256 mg/dL (04-25-23 @ 12:04)  POCT Blood Glucose.: 260 mg/dL (04-25-23 @ 07:49)  POCT Blood Glucose.: 287 mg/dL (04-24-23 @ 21:16)  POCT Blood Glucose.: 250 mg/dL (04-24-23 @ 16:50)                              8.5    9.09  )-----------( 273      ( 27 Apr 2023 06:35 )             27.8       04-27    135  |  99  |  18  ----------------------------<  156<H>  3.6   |  24  |  1.79<H>    eGFR: 30<L>    Ca    8.7      04-27  Mg     1.8     04-27  Phos  2.6     04-27      Thyroid Function Tests:  04-05 @ 21:55 TSH 1.16 FreeT4 -- T3 -- Anti TPO -- Anti Thyroglobulin Ab -- TSI --            Radiology:

## 2023-04-27 NOTE — PROGRESS NOTE ADULT - ATTENDING COMMENTS
Patient is a 72 year old female, with PMH of DM, HTN, CAD s/p stent (2019), recent admission for pericardial effusion, started on high-dose ASA & colchicine for suspected pericarditis, course complicated by new-onset AFib with RVR for which Eliquis was started, presenting for epigastric abdominal pain.     #Epigastric pain   #FARSHAD   #Pneumonia   #Anemia   #Hyponatremia     - patient presented due to chest/epigastric pain; original concern for colchicine toxicity but less likely; tox recs appreciated; may be due to gastritis vs PUD?  - noted to have drop in Hgb down to 6.6 in ED; given 1 PRBC with improvement to 8.6; unsure if low Hgb level was accurate since Hgb improved more than expected; Hgb stable currently; continue eliquis; continue PPI daily and continue with carafate for now; continue to monitor Hgb   - can consider GI consult if noted to have signs of GI bleed and/or Hgb continues to drop   - hypoxia overall improving; does not appear to be in respiratory distress; wean off O2 and monitor; saturating low 90s on room air; ambu sats noted; does not require home O2   - continue home colchicine; pericardial effusion appears to be resolved based on CT abd findings   - ECHO done showing no pericardial effusion   - opacities noted on CT abd; procal mildly elevated; spiked fever of 101.7 overnight 4/24; started on zosyn; blood cx neg to date; CT chest done showing bilateral opacities; continue with IV abx and consider transition to PO in 1-2 days pending clinical improvement     - increase clonidine to 0.2 due to elevated blood pressure   - Cr elevated based on prior labs; s/p IVF but creatinine still elevated; renal US done showing MRD; continue to monitor Cr; appears stable currently    - OOBTC; encourage IS use     DC planning likely in AM if remains stable off O2    Discussed with patient and son at bedside.     Rest as above. Discussed with HS. Patient is a 72 year old female, with PMH of DM, HTN, CAD s/p stent (2019), recent admission for pericardial effusion, started on high-dose ASA & colchicine for suspected pericarditis, course complicated by new-onset AFib with RVR for which Eliquis was started, presenting for epigastric abdominal pain.     #Epigastric pain   #FARSHAD   #Pneumonia   #Anemia   #Hyponatremia     - patient presented due to chest/epigastric pain; original concern for colchicine toxicity but less likely; tox recs appreciated; may be due to gastritis vs PUD?  - noted to have drop in Hgb down to 6.6 in ED; given 1 PRBC with improvement to 8.6; unsure if low Hgb level was accurate since Hgb improved more than expected; Hgb stable currently; continue eliquis; continue PPI daily and continue with carafate for now; continue to monitor Hgb   - can consider GI consult if noted to have signs of GI bleed and/or Hgb continues to drop   - hypoxia overall improving; does not appear to be in respiratory distress; wean off O2 and monitor; saturating low 90s on room air; ambu sats noted; does not require home O2   - continue home colchicine; pericardial effusion appears to be resolved based on CT abd findings   - ECHO done showing no pericardial effusion   - opacities noted on CT abd; procal mildly elevated; spiked fever of 101.7 overnight 4/24; started on zosyn; blood cx neg to date; CT chest done showing bilateral opacities; continue with IV abx and consider transition to PO in 1-2 days pending clinical improvement     - increase clonidine to 0.2 due to elevated blood pressure   - Cr elevated based on prior labs; s/p IVF but creatinine still elevated; renal US done showing MRD; continue to monitor Cr; appears stable currently    - OOBTC; encourage IS use   - PT eval pending     DC planning likely in AM if remains stable off O2    Discussed with patient and son at bedside.     Rest as above. Discussed with HS.

## 2023-04-27 NOTE — PROGRESS NOTE ADULT - PROBLEM SELECTOR PLAN 6
DVT: Eliquis  Diet: Vegetarian, CC/DASH  Dispo: Home with home PT per recent admission. DVT: Eliquis  Diet: Vegetarian, CC/DASH  Dispo: Will obtain PT recs

## 2023-04-27 NOTE — PROGRESS NOTE ADULT - PROBLEM SELECTOR PLAN 5
- Improved s/p 2L fluid bolus  - Will restart her home clonidine and hydralazine, hold enalapril in the setting of FARSHAD. - Improved s/p 2L fluid bolus  - Will restart her home clonidine and hydralazine, hold enalapril in the setting of FARSHAD.  - Increased clonidine dose to 0.2 BID

## 2023-04-28 ENCOUNTER — TRANSCRIPTION ENCOUNTER (OUTPATIENT)
Age: 73
End: 2023-04-28

## 2023-04-28 VITALS
TEMPERATURE: 98 F | DIASTOLIC BLOOD PRESSURE: 77 MMHG | RESPIRATION RATE: 18 BRPM | HEART RATE: 84 BPM | OXYGEN SATURATION: 94 % | SYSTOLIC BLOOD PRESSURE: 147 MMHG

## 2023-04-28 LAB
ANION GAP SERPL CALC-SCNC: 11 MMOL/L — SIGNIFICANT CHANGE UP (ref 5–17)
BUN SERPL-MCNC: 18 MG/DL — SIGNIFICANT CHANGE UP (ref 7–23)
CALCIUM SERPL-MCNC: 8.5 MG/DL — SIGNIFICANT CHANGE UP (ref 8.4–10.5)
CHLORIDE SERPL-SCNC: 100 MMOL/L — SIGNIFICANT CHANGE UP (ref 96–108)
CO2 SERPL-SCNC: 25 MMOL/L — SIGNIFICANT CHANGE UP (ref 22–31)
CREAT SERPL-MCNC: 1.8 MG/DL — HIGH (ref 0.5–1.3)
EGFR: 30 ML/MIN/1.73M2 — LOW
GLUCOSE BLDC GLUCOMTR-MCNC: 234 MG/DL — HIGH (ref 70–99)
GLUCOSE BLDC GLUCOMTR-MCNC: 282 MG/DL — HIGH (ref 70–99)
GLUCOSE SERPL-MCNC: 172 MG/DL — HIGH (ref 70–99)
HCT VFR BLD CALC: 27 % — LOW (ref 34.5–45)
HGB BLD-MCNC: 8.4 G/DL — LOW (ref 11.5–15.5)
MAGNESIUM SERPL-MCNC: 1.7 MG/DL — SIGNIFICANT CHANGE UP (ref 1.6–2.6)
MCHC RBC-ENTMCNC: 25.4 PG — LOW (ref 27–34)
MCHC RBC-ENTMCNC: 31.1 GM/DL — LOW (ref 32–36)
MCV RBC AUTO: 81.6 FL — SIGNIFICANT CHANGE UP (ref 80–100)
NRBC # BLD: 0 /100 WBCS — SIGNIFICANT CHANGE UP (ref 0–0)
PHOSPHATE SERPL-MCNC: 2.5 MG/DL — SIGNIFICANT CHANGE UP (ref 2.5–4.5)
PLATELET # BLD AUTO: 294 K/UL — SIGNIFICANT CHANGE UP (ref 150–400)
POTASSIUM SERPL-MCNC: 3.5 MMOL/L — SIGNIFICANT CHANGE UP (ref 3.5–5.3)
POTASSIUM SERPL-SCNC: 3.5 MMOL/L — SIGNIFICANT CHANGE UP (ref 3.5–5.3)
RBC # BLD: 3.31 M/UL — LOW (ref 3.8–5.2)
RBC # FLD: 17.7 % — HIGH (ref 10.3–14.5)
SODIUM SERPL-SCNC: 136 MMOL/L — SIGNIFICANT CHANGE UP (ref 135–145)
WBC # BLD: 9.47 K/UL — SIGNIFICANT CHANGE UP (ref 3.8–10.5)
WBC # FLD AUTO: 9.47 K/UL — SIGNIFICANT CHANGE UP (ref 3.8–10.5)

## 2023-04-28 PROCEDURE — 86140 C-REACTIVE PROTEIN: CPT

## 2023-04-28 PROCEDURE — 85652 RBC SED RATE AUTOMATED: CPT

## 2023-04-28 PROCEDURE — 84100 ASSAY OF PHOSPHORUS: CPT

## 2023-04-28 PROCEDURE — 96375 TX/PRO/DX INJ NEW DRUG ADDON: CPT

## 2023-04-28 PROCEDURE — 82330 ASSAY OF CALCIUM: CPT

## 2023-04-28 PROCEDURE — 83605 ASSAY OF LACTIC ACID: CPT

## 2023-04-28 PROCEDURE — 76770 US EXAM ABDO BACK WALL COMP: CPT

## 2023-04-28 PROCEDURE — 96374 THER/PROPH/DIAG INJ IV PUSH: CPT

## 2023-04-28 PROCEDURE — 93005 ELECTROCARDIOGRAM TRACING: CPT

## 2023-04-28 PROCEDURE — 84295 ASSAY OF SERUM SODIUM: CPT

## 2023-04-28 PROCEDURE — 85025 COMPLETE CBC W/AUTO DIFF WBC: CPT

## 2023-04-28 PROCEDURE — 82962 GLUCOSE BLOOD TEST: CPT

## 2023-04-28 PROCEDURE — 82435 ASSAY OF BLOOD CHLORIDE: CPT

## 2023-04-28 PROCEDURE — 87086 URINE CULTURE/COLONY COUNT: CPT

## 2023-04-28 PROCEDURE — 71045 X-RAY EXAM CHEST 1 VIEW: CPT

## 2023-04-28 PROCEDURE — 85730 THROMBOPLASTIN TIME PARTIAL: CPT

## 2023-04-28 PROCEDURE — 97161 PT EVAL LOW COMPLEX 20 MIN: CPT

## 2023-04-28 PROCEDURE — 36430 TRANSFUSION BLD/BLD COMPNT: CPT

## 2023-04-28 PROCEDURE — 83690 ASSAY OF LIPASE: CPT

## 2023-04-28 PROCEDURE — 84145 PROCALCITONIN (PCT): CPT

## 2023-04-28 PROCEDURE — 82803 BLOOD GASES ANY COMBINATION: CPT

## 2023-04-28 PROCEDURE — 83735 ASSAY OF MAGNESIUM: CPT

## 2023-04-28 PROCEDURE — 93308 TTE F-UP OR LMTD: CPT

## 2023-04-28 PROCEDURE — 84484 ASSAY OF TROPONIN QUANT: CPT

## 2023-04-28 PROCEDURE — 82570 ASSAY OF URINE CREATININE: CPT

## 2023-04-28 PROCEDURE — 84156 ASSAY OF PROTEIN URINE: CPT

## 2023-04-28 PROCEDURE — 86900 BLOOD TYPING SEROLOGIC ABO: CPT

## 2023-04-28 PROCEDURE — 85610 PROTHROMBIN TIME: CPT

## 2023-04-28 PROCEDURE — 99232 SBSQ HOSP IP/OBS MODERATE 35: CPT

## 2023-04-28 PROCEDURE — 85045 AUTOMATED RETICULOCYTE COUNT: CPT

## 2023-04-28 PROCEDURE — 80053 COMPREHEN METABOLIC PANEL: CPT

## 2023-04-28 PROCEDURE — 84540 ASSAY OF URINE/UREA-N: CPT

## 2023-04-28 PROCEDURE — 99239 HOSP IP/OBS DSCHRG MGMT >30: CPT | Mod: GC

## 2023-04-28 PROCEDURE — 85014 HEMATOCRIT: CPT

## 2023-04-28 PROCEDURE — 85018 HEMOGLOBIN: CPT

## 2023-04-28 PROCEDURE — 82947 ASSAY GLUCOSE BLOOD QUANT: CPT

## 2023-04-28 PROCEDURE — 74177 CT ABD & PELVIS W/CONTRAST: CPT | Mod: MA

## 2023-04-28 PROCEDURE — 85027 COMPLETE CBC AUTOMATED: CPT

## 2023-04-28 PROCEDURE — 71260 CT THORAX DX C+: CPT

## 2023-04-28 PROCEDURE — 84132 ASSAY OF SERUM POTASSIUM: CPT

## 2023-04-28 PROCEDURE — 87040 BLOOD CULTURE FOR BACTERIA: CPT

## 2023-04-28 PROCEDURE — 99285 EMERGENCY DEPT VISIT HI MDM: CPT

## 2023-04-28 PROCEDURE — 86850 RBC ANTIBODY SCREEN: CPT

## 2023-04-28 PROCEDURE — 80048 BASIC METABOLIC PNL TOTAL CA: CPT

## 2023-04-28 PROCEDURE — 84133 ASSAY OF URINE POTASSIUM: CPT

## 2023-04-28 PROCEDURE — 84300 ASSAY OF URINE SODIUM: CPT

## 2023-04-28 PROCEDURE — 81001 URINALYSIS AUTO W/SCOPE: CPT

## 2023-04-28 PROCEDURE — 36415 COLL VENOUS BLD VENIPUNCTURE: CPT

## 2023-04-28 PROCEDURE — 0225U NFCT DS DNA&RNA 21 SARSCOV2: CPT

## 2023-04-28 PROCEDURE — 86901 BLOOD TYPING SEROLOGIC RH(D): CPT

## 2023-04-28 PROCEDURE — P9016: CPT

## 2023-04-28 PROCEDURE — 86923 COMPATIBILITY TEST ELECTRIC: CPT

## 2023-04-28 RX ORDER — CANAGLIFLOZIN 100 MG/1
1 TABLET, FILM COATED ORAL
Qty: 30 | Refills: 0
Start: 2023-04-28 | End: 2023-05-27

## 2023-04-28 RX ORDER — LEVOFLOXACIN 5 MG/ML
1 INJECTION, SOLUTION INTRAVENOUS
Qty: 2 | Refills: 0
Start: 2023-04-28 | End: 2023-05-01

## 2023-04-28 RX ORDER — SUCRALFATE 1 G
1 TABLET ORAL
Qty: 0 | Refills: 0 | DISCHARGE
Start: 2023-04-28

## 2023-04-28 RX ORDER — LINAGLIPTIN 5 MG/1
1 TABLET, FILM COATED ORAL
Qty: 30 | Refills: 0
Start: 2023-04-28 | End: 2023-05-27

## 2023-04-28 RX ORDER — METFORMIN HYDROCHLORIDE 850 MG/1
1 TABLET ORAL
Qty: 0 | Refills: 0 | DISCHARGE
Start: 2023-04-28

## 2023-04-28 RX ORDER — SUCRALFATE 1 G
1 TABLET ORAL
Qty: 120 | Refills: 0
Start: 2023-04-28 | End: 2023-05-27

## 2023-04-28 RX ADMIN — Medication 50 MILLIGRAM(S): at 05:43

## 2023-04-28 RX ADMIN — Medication 1 GRAM(S): at 00:13

## 2023-04-28 RX ADMIN — Medication 3: at 12:51

## 2023-04-28 RX ADMIN — APIXABAN 2.5 MILLIGRAM(S): 2.5 TABLET, FILM COATED ORAL at 05:43

## 2023-04-28 RX ADMIN — Medication 0.2 MILLIGRAM(S): at 05:43

## 2023-04-28 RX ADMIN — Medication 75 MILLIGRAM(S): at 05:43

## 2023-04-28 RX ADMIN — Medication 1 GRAM(S): at 05:43

## 2023-04-28 RX ADMIN — Medication 5 UNIT(S): at 12:51

## 2023-04-28 RX ADMIN — Medication 1 GRAM(S): at 12:51

## 2023-04-28 RX ADMIN — GABAPENTIN 300 MILLIGRAM(S): 400 CAPSULE ORAL at 05:43

## 2023-04-28 RX ADMIN — Medication 0.3 MILLIGRAM(S): at 12:50

## 2023-04-28 RX ADMIN — Medication 5 UNIT(S): at 08:44

## 2023-04-28 RX ADMIN — PANTOPRAZOLE SODIUM 40 MILLIGRAM(S): 20 TABLET, DELAYED RELEASE ORAL at 12:52

## 2023-04-28 RX ADMIN — Medication 2: at 08:44

## 2023-04-28 RX ADMIN — PIPERACILLIN AND TAZOBACTAM 25 GRAM(S): 4; .5 INJECTION, POWDER, LYOPHILIZED, FOR SOLUTION INTRAVENOUS at 05:42

## 2023-04-28 NOTE — PROGRESS NOTE ADULT - SUBJECTIVE AND OBJECTIVE BOX
seen earlier today     Chief Complaint: Diabetes Mellitus follow up    INTERVAL HX: pt reports dislike for food served as limited vegetarian options, family at bedside, pt reports she is moving to [place] sunday to live with her daughter who is a nurse, daughter will help her with DM management per pt and family at bedside. BG above goal, noted zosyn remains in D5       Review of Systems:  General: As above  GI: No nausea, vomiting  Endocrine: no  S&Sx of hypoglycemia    Allergies    felodipine (Unknown)    Intolerances      MEDICATIONS  (STANDING):  apixaban 2.5 milliGRAM(s) Oral every 12 hours  atorvastatin 20 milliGRAM(s) Oral at bedtime  cloNIDine 0.2 milliGRAM(s) Oral two times a day  colchicine 0.3 milliGRAM(s) Oral daily  dextrose 5%. 1000 milliLiter(s) (50 mL/Hr) IV Continuous <Continuous>  dextrose 5%. 1000 milliLiter(s) (100 mL/Hr) IV Continuous <Continuous>  dextrose 50% Injectable 25 Gram(s) IV Push once  dextrose 50% Injectable 12.5 Gram(s) IV Push once  dextrose 50% Injectable 25 Gram(s) IV Push once  gabapentin 300 milliGRAM(s) Oral two times a day  glucagon  Injectable 1 milliGRAM(s) IntraMuscular once  hydrALAZINE 75 milliGRAM(s) Oral three times a day  insulin lispro (ADMELOG) corrective regimen sliding scale   SubCutaneous three times a day before meals  insulin lispro (ADMELOG) corrective regimen sliding scale   SubCutaneous at bedtime  insulin lispro Injectable (ADMELOG) 5 Unit(s) SubCutaneous three times a day before meals  metoprolol tartrate 50 milliGRAM(s) Oral two times a day  pantoprazole  Injectable 40 milliGRAM(s) IV Push daily  piperacillin/tazobactam IVPB.. 3.375 Gram(s) IV Intermittent every 8 hours  sucralfate 1 Gram(s) Oral four times a day      atorvastatin   20 milliGRAM(s) Oral (04-27-23 @ 21:32)    colchicine   0.3 milliGRAM(s) Oral (04-27-23 @ 12:23)    insulin lispro (ADMELOG) corrective regimen sliding scale   2 Unit(s) SubCutaneous (04-28-23 @ 08:44)   1 Unit(s) SubCutaneous (04-27-23 @ 17:11)   1 Unit(s) SubCutaneous (04-27-23 @ 12:28)    insulin lispro Injectable (ADMELOG)   3 Unit(s) SubCutaneous (04-27-23 @ 12:26)    insulin lispro Injectable (ADMELOG)   5 Unit(s) SubCutaneous (04-28-23 @ 08:44)   5 Unit(s) SubCutaneous (04-27-23 @ 17:11)        PHYSICAL EXAM:  VITALS: T(C): 36.5 (04-28-23 @ 05:08)  T(F): 97.7 (04-28-23 @ 05:08), Max: 98.8 (04-27-23 @ 12:34)  HR: 70 (04-28-23 @ 05:40) (62 - 72)  BP: 161/75 (04-28-23 @ 05:40) (159/74 - 167/78)  RR:  (18 - 18)  SpO2:  (92% - 96%)  Wt(kg): --  GENERAL: female laying in bed in NAD  Respiratory: Respirations unlabored   Extremities: Warm, no edema  NEURO: Alert , appropriate     LABS:  POCT Blood Glucose.: 234 mg/dL (04-28-23 @ 08:29)  POCT Blood Glucose.: 238 mg/dL (04-27-23 @ 21:14)  POCT Blood Glucose.: 151 mg/dL (04-27-23 @ 16:49)  POCT Blood Glucose.: 195 mg/dL (04-27-23 @ 12:14)  POCT Blood Glucose.: 231 mg/dL (04-27-23 @ 08:05)  POCT Blood Glucose.: 320 mg/dL (04-26-23 @ 22:23)  POCT Blood Glucose.: 292 mg/dL (04-26-23 @ 16:49)  POCT Blood Glucose.: 189 mg/dL (04-26-23 @ 11:58)  POCT Blood Glucose.: 175 mg/dL (04-26-23 @ 08:04)  POCT Blood Glucose.: 191 mg/dL (04-25-23 @ 22:39)  POCT Blood Glucose.: 210 mg/dL (04-25-23 @ 17:08)  POCT Blood Glucose.: 256 mg/dL (04-25-23 @ 12:04)                          8.4    9.47  )-----------( 294      ( 28 Apr 2023 06:56 )             27.0     04-28    136  |  100  |  18  ----------------------------<  172<H>  3.5   |  25  |  1.80<H>    Ca    8.5      28 Apr 2023 06:56  Phos  2.5     04-28  Mg     1.7     04-28      eGFR: 30 mL/min/1.73m2 (28 Apr 2023 06:56)      Thyroid Function Tests:  04-05 @ 21:55 TSH 1.16 FreeT4 -- T3 -- Anti TPO -- Anti Thyroglobulin Ab -- TSI --      A1C with Estimated Average Glucose Result: 8.5 % (04-06-23 @ 06:30)    Estimated Average Glucose: 197 mg/dL (04-06-23 @ 06:30)        Diet, Regular:   Consistent Carbohydrate Evening Snack (CSTCHOSN)  DASH/TLC Sodium & Cholesterol Restricted (DASH)  Vegan Accepts Vegetable Products Only (04-24-23 @ 12:33) [Active]

## 2023-04-28 NOTE — PROGRESS NOTE ADULT - SUBJECTIVE AND OBJECTIVE BOX
Patient is a 72y old  Female who presents with a chief complaint of Epigastric and chest pain (27 Apr 2023 15:58)      SUBJECTIVE / OVERNIGHT EVENTS:    MEDICATIONS  (STANDING):  apixaban 2.5 milliGRAM(s) Oral every 12 hours  atorvastatin 20 milliGRAM(s) Oral at bedtime  cloNIDine 0.2 milliGRAM(s) Oral two times a day  colchicine 0.3 milliGRAM(s) Oral daily  dextrose 5%. 1000 milliLiter(s) (100 mL/Hr) IV Continuous <Continuous>  dextrose 5%. 1000 milliLiter(s) (50 mL/Hr) IV Continuous <Continuous>  dextrose 50% Injectable 12.5 Gram(s) IV Push once  dextrose 50% Injectable 25 Gram(s) IV Push once  dextrose 50% Injectable 25 Gram(s) IV Push once  gabapentin 300 milliGRAM(s) Oral two times a day  glucagon  Injectable 1 milliGRAM(s) IntraMuscular once  hydrALAZINE 75 milliGRAM(s) Oral three times a day  insulin lispro (ADMELOG) corrective regimen sliding scale   SubCutaneous three times a day before meals  insulin lispro (ADMELOG) corrective regimen sliding scale   SubCutaneous at bedtime  insulin lispro Injectable (ADMELOG) 5 Unit(s) SubCutaneous three times a day before meals  metoprolol tartrate 50 milliGRAM(s) Oral two times a day  pantoprazole  Injectable 40 milliGRAM(s) IV Push daily  piperacillin/tazobactam IVPB.. 3.375 Gram(s) IV Intermittent every 8 hours  sucralfate 1 Gram(s) Oral four times a day    MEDICATIONS  (PRN):  acetaminophen     Tablet .. 650 milliGRAM(s) Oral every 6 hours PRN Temp greater or equal to 38C (100.4F), Mild Pain (1 - 3)  aluminum hydroxide/magnesium hydroxide/simethicone Suspension 30 milliLiter(s) Oral every 4 hours PRN Dyspepsia  dextrose Oral Gel 15 Gram(s) Oral once PRN Blood Glucose LESS THAN 70 milliGRAM(s)/deciliter  melatonin 3 milliGRAM(s) Oral at bedtime PRN Insomnia      CAPILLARY BLOOD GLUCOSE      POCT Blood Glucose.: 238 mg/dL (27 Apr 2023 21:14)  POCT Blood Glucose.: 151 mg/dL (27 Apr 2023 16:49)  POCT Blood Glucose.: 195 mg/dL (27 Apr 2023 12:14)  POCT Blood Glucose.: 231 mg/dL (27 Apr 2023 08:05)    I&O's Summary    27 Apr 2023 07:01  -  28 Apr 2023 07:00  --------------------------------------------------------  IN: 600 mL / OUT: 0 mL / NET: 600 mL        Vital Signs Last 24 Hrs  T(C): 36.5 (28 Apr 2023 05:08), Max: 37.1 (27 Apr 2023 12:34)  T(F): 97.7 (28 Apr 2023 05:08), Max: 98.8 (27 Apr 2023 12:34)  HR: 70 (28 Apr 2023 05:40) (62 - 72)  BP: 161/75 (28 Apr 2023 05:40) (159/74 - 167/78)  BP(mean): --  RR: 18 (28 Apr 2023 05:08) (18 - 18)  SpO2: 95% (28 Apr 2023 05:08) (91% - 96%)    Parameters below as of 28 Apr 2023 05:08  Patient On (Oxygen Delivery Method): room air        PHYSICAL EXAM:  GENERAL: Sitting in bed in no acute distress  EYES: Conjunctiva noninjected or pale, sclera anicteric  HENT: NC/AT, moist mucous membranes  NECK: Supple, trachea midline  LUNG: Nonlabored respirations, no wheezes, rales  CV: RRR, Pulses- Radial: 2+ b/l  ABDOMEN: Nondistended, nontender  MSK: No visible deformities, nontender extremities  SKIN: No rashes, bruises  NEURO: AAOx4 (to person, place, time, event), no tremor  PSYCH: Normal mood and affect    LABS:                        8.5    9.09  )-----------( 273      ( 27 Apr 2023 06:35 )             27.8      04-28    136  |  100  |  18  ----------------------------<  172<H>  3.5   |  25  |  1.80<H>    Ca    8.5      28 Apr 2023 06:56  Phos  2.5     04-28  Mg     1.7     04-28                RADIOLOGY & ADDITIONAL TESTS:    Imaging Personally Reviewed:    Consultant(s) Notes Reviewed:      Care Discussed with Consultants/Other Providers:   Patient is a 72y old  Female who presents with a chief complaint of Epigastric and chest pain (27 Apr 2023 15:58)      SUBJECTIVE / OVERNIGHT EVENTS: On room air, appears comfortable. Eating well. Endorses urinating well. Feels that incentive spirometer has been helping symptoms.     MEDICATIONS  (STANDING):  apixaban 2.5 milliGRAM(s) Oral every 12 hours  atorvastatin 20 milliGRAM(s) Oral at bedtime  cloNIDine 0.2 milliGRAM(s) Oral two times a day  colchicine 0.3 milliGRAM(s) Oral daily  dextrose 5%. 1000 milliLiter(s) (100 mL/Hr) IV Continuous <Continuous>  dextrose 5%. 1000 milliLiter(s) (50 mL/Hr) IV Continuous <Continuous>  dextrose 50% Injectable 12.5 Gram(s) IV Push once  dextrose 50% Injectable 25 Gram(s) IV Push once  dextrose 50% Injectable 25 Gram(s) IV Push once  gabapentin 300 milliGRAM(s) Oral two times a day  glucagon  Injectable 1 milliGRAM(s) IntraMuscular once  hydrALAZINE 75 milliGRAM(s) Oral three times a day  insulin lispro (ADMELOG) corrective regimen sliding scale   SubCutaneous three times a day before meals  insulin lispro (ADMELOG) corrective regimen sliding scale   SubCutaneous at bedtime  insulin lispro Injectable (ADMELOG) 5 Unit(s) SubCutaneous three times a day before meals  metoprolol tartrate 50 milliGRAM(s) Oral two times a day  pantoprazole  Injectable 40 milliGRAM(s) IV Push daily  piperacillin/tazobactam IVPB.. 3.375 Gram(s) IV Intermittent every 8 hours  sucralfate 1 Gram(s) Oral four times a day    MEDICATIONS  (PRN):  acetaminophen     Tablet .. 650 milliGRAM(s) Oral every 6 hours PRN Temp greater or equal to 38C (100.4F), Mild Pain (1 - 3)  aluminum hydroxide/magnesium hydroxide/simethicone Suspension 30 milliLiter(s) Oral every 4 hours PRN Dyspepsia  dextrose Oral Gel 15 Gram(s) Oral once PRN Blood Glucose LESS THAN 70 milliGRAM(s)/deciliter  melatonin 3 milliGRAM(s) Oral at bedtime PRN Insomnia      CAPILLARY BLOOD GLUCOSE      POCT Blood Glucose.: 238 mg/dL (27 Apr 2023 21:14)  POCT Blood Glucose.: 151 mg/dL (27 Apr 2023 16:49)  POCT Blood Glucose.: 195 mg/dL (27 Apr 2023 12:14)  POCT Blood Glucose.: 231 mg/dL (27 Apr 2023 08:05)    I&O's Summary    27 Apr 2023 07:01  -  28 Apr 2023 07:00  --------------------------------------------------------  IN: 600 mL / OUT: 0 mL / NET: 600 mL        Vital Signs Last 24 Hrs  T(C): 36.5 (28 Apr 2023 05:08), Max: 37.1 (27 Apr 2023 12:34)  T(F): 97.7 (28 Apr 2023 05:08), Max: 98.8 (27 Apr 2023 12:34)  HR: 70 (28 Apr 2023 05:40) (62 - 72)  BP: 161/75 (28 Apr 2023 05:40) (159/74 - 167/78)  BP(mean): --  RR: 18 (28 Apr 2023 05:08) (18 - 18)  SpO2: 95% (28 Apr 2023 05:08) (91% - 96%)    Parameters below as of 28 Apr 2023 05:08  Patient On (Oxygen Delivery Method): room air        PHYSICAL EXAM:  GENERAL: Sitting in bed in no acute distress  EYES: Conjunctiva noninjected or pale, sclera anicteric  HENT: NC/AT, moist mucous membranes  NECK: Supple, trachea midline  LUNG: Nonlabored respirations, no wheezes. Mild crackles R lower lung  CV: RRR, Pulses- Radial: 2+ b/l  ABDOMEN: Nondistended, nontender  MSK: No visible deformities, nontender extremities  SKIN: No rashes, bruises  NEURO: AAOx4 (to person, place, time, event), no tremor  PSYCH: Normal mood and affect    LABS:                        8.5    9.09  )-----------( 273      ( 27 Apr 2023 06:35 )             27.8      04-28    136  |  100  |  18  ----------------------------<  172<H>  3.5   |  25  |  1.80<H>    Ca    8.5      28 Apr 2023 06:56  Phos  2.5     04-28  Mg     1.7     04-28                RADIOLOGY & ADDITIONAL TESTS:    Imaging Personally Reviewed:    Consultant(s) Notes Reviewed:      Care Discussed with Consultants/Other Providers:

## 2023-04-28 NOTE — PROGRESS NOTE ADULT - PROBLEM SELECTOR PLAN 4
Continued hematuria with pink tinged urine, creatinine ~1.7 during this hospitalization  - Baseline approximately 1.0  - UA and UCX with no bacterial growth  - US kidney with medical renal disease  - Stable, unclear origin Continued hematuria with pink tinged urine, creatinine ~1.7 during this hospitalization  - Baseline approximately 1.0  - UA and UCX with no bacterial growth  - US kidney with medical renal disease  - Stable, unclear origin  - Advised patient to have close outpatient follow up

## 2023-04-28 NOTE — PROGRESS NOTE ADULT - REASON FOR ADMISSION
Epigastric and chest pain

## 2023-04-28 NOTE — DISCHARGE NOTE NURSING/CASE MANAGEMENT/SOCIAL WORK - NSDCPEFALRISK_GEN_ALL_CORE
For information on Fall & Injury Prevention, visit: [email or web address] OR  [email or web address] OR  https://www.cdc.gov/steadi/patient.html

## 2023-04-28 NOTE — PROGRESS NOTE ADULT - ATTENDING COMMENTS
Patient is a 72 year old female, with PMH of DM, HTN, CAD s/p stent (2019), recent admission for pericardial effusion, started on high-dose ASA & colchicine for suspected pericarditis, course complicated by new-onset AFib with RVR for which Eliquis was started, presenting for epigastric abdominal pain.     #Epigastric pain   #FARSHAD   #Pneumonia   #Anemia   #Hyponatremia     - patient presented due to chest/epigastric pain; original concern for colchicine toxicity but less likely; tox recs appreciated; may be due to gastritis vs PUD?  - noted to have drop in Hgb down to 6.6 in ED; given 1 PRBC with improvement to 8.6; unsure if low Hgb level was accurate since Hgb improved more than expected; Hgb stable currently; continue eliquis; continue PPI daily and continue with carafate for now; continue to monitor Hgb   - can consider GI consult if noted to have signs of GI bleed and/or Hgb continues to drop   - hypoxia overall improving; does not appear to be in respiratory distress; saturating well on RA; ambu sats noted; does not require home O2   - continue home colchicine; pericardial effusion appears to be resolved based on CT abd findings   - ECHO done showing no pericardial effusion   - opacities noted on CT abd; procal mildly elevated; spiked fever of 101.7 overnight 4/24; started on zosyn; blood cx neg to date; CT chest done showing bilateral opacities; continue with IV abx; plan to transition to PO levaquin on DC to complete course      - Cr elevated based on prior labs; s/p IVF but creatinine still elevated; renal US done showing MRD; continue to monitor Cr; appears stable currently    - OOBTC; encourage IS use   - PT eval: no skilled PT needs      DC planning to home today   40 mins spent on DC planning   Discussed with patient and son at bedside.     Rest as above. Discussed with HS.

## 2023-04-28 NOTE — DISCHARGE NOTE NURSING/CASE MANAGEMENT/SOCIAL WORK - PATIENT PORTAL LINK FT
You can access the FollowMyHealth Patient Portal offered by [facility] by registering at the following website: [email or web address]. By joining [employer]’s FollowMyHealth portal, you will also be able to view your health information using other applications (apps) compatible with our system.

## 2023-04-28 NOTE — PROGRESS NOTE ADULT - PROBLEM SELECTOR PLAN 6
DVT: Eliquis  Diet: Vegetarian, CC/DASH  Dispo: Will obtain PT recs DVT: Eliquis  Diet: Vegetarian, CC/DASH  Dispo: Per PT, no skilled needs

## 2023-04-28 NOTE — PROGRESS NOTE ADULT - ASSESSMENT
73 y/o F w/ hx of Type 2 DM w/ hyperglycemia complicated by CAD, HTN, HLD admitted with admitted with epigastric abdominal pain and chest pain found to have b/l pneumonia.     : Type 2 diabetes mellitus with hyperglycemia.   ·  Recommendation: Diabetes Education and Nutrition Eval  Change Zosyn to NS from dextrose.   Discontinue basal insulin. Fasting has been at or close to goal with large drop overnight despite not having received any basal insulin thus far.   Start Admelog 5 units qac  Low correction scale qac + bedtime  Goal glucose 100-180  Outpt. endo follow-up  Outpt. optho, podiatry, nephrology  Discharge:  pt declined [facility] follow up info, pt relocating to [place] this weekend to live with daughter, spoke with Daughter Charis who is an RN via phone per pt request ( [phone]) and reviewed DM dc planning daughter in agreement with DM plan and is arranging close follow ups for pt in [place]   eGFR: 30 mL/min/1.73m2 (28 Apr 2023 06:56)  A1C with Estimated Average Glucose Result: 8.5 % (04-06-23 @ 06:30)  - Adjust Metformin 500mg BID (reduced from home dose 2/2 kidney function)   - Start Tradjenta 5mg qd   - please follow up with Nephro as outpatient if Cr remains elevated to determine if addition of SGLT2i for CKD if appropriate in future (no evidence of CKD at this time per discussion  with primary team)   - optho f/u as outpatient  - ensure pt has glucometer and all testing supplies     Problem/Recommendation - 2:  ·  Problem: Hypertension.   ·  Recommendation: Goal BP <130/80 continue with hydralazine and clonidine.     Problem/Recommendation - 3:  ·  Problem: Hyperlipidemia.   ·  Recommendation: c/w statin        discussed with patient and RN & Dr Gorman   Contact via [employer] Teams during business hours  To reach covering provider access [employer] via [email or web address]  For Urgent matters/after-hours/weekends/holidays please page endocrine fellow on call   For nonurgent matters please email [email or web address]    Please note that this patient may be followed by different provider tomorrow.  Notify endocrine 24 hours prior to discharge for final recommendations  73 y/o F w/ hx of Type 2 DM w/ hyperglycemia complicated by CAD, HTN, HLD admitted with admitted with epigastric abdominal pain and chest pain found to have b/l pneumonia.     : Type 2 diabetes mellitus with hyperglycemia.   ·  Recommendation: Diabetes Education and Nutrition Eval  Change Zosyn to NS from dextrose.   FBG above goal x1 , if FBG remains elevated start low dose basal insulin tomorrow  continue with Admelog 5 units qac  Low correction scale qac + bedtime  Goal glucose 100-180  Outpt. endo follow-up  Outpt. optho, podiatry, nephrology  Discharge:  pt declined [facility] follow up info, pt relocating to [place] this weekend to live with daughter, spoke with Daughter Charis who is an RN via phone per pt request ( [phone]) and reviewed DM dc planning daughter in agreement with DM plan and is arranging close follow ups for pt in [place]   eGFR: 30 mL/min/1.73m2 (28 Apr 2023 06:56)  A1C with Estimated Average Glucose Result: 8.5 % (04-06-23 @ 06:30)  - Adjust Metformin 500mg BID (reduced from home dose 2/2 kidney function)   - Start Tradjenta 5mg qd   - please follow up with Nephro as outpatient if Cr remains elevated to determine if addition of SGLT2i for CKD if appropriate in future (no evidence of CKD at this time per discussion  with primary team)   - optho f/u as outpatient  - ensure pt has glucometer and all testing supplies     Problem/Recommendation - 2:  ·  Problem: Hypertension.   ·  Recommendation: Goal BP <130/80 continue with hydralazine and clonidine.     Problem/Recommendation - 3:  ·  Problem: Hyperlipidemia.   ·  Recommendation: c/w statin        discussed with patient and RN & Dr Gorman   Contact via [employer] Teams during business hours  To reach covering provider access [employer] via [email or web address]  For Urgent matters/after-hours/weekends/holidays please page endocrine fellow on call   For nonurgent matters please email [email or web address]    Please note that this patient may be followed by different provider tomorrow.  Notify endocrine 24 hours prior to discharge for final recommendations

## 2023-04-28 NOTE — PROGRESS NOTE ADULT - PROBLEM SELECTOR PLAN 2
- Blood cultures x2 NGTD  - CT chest with bilateral upper lobe predominant GGOs  - Started on Zosyn 4/24 overnight  - Consider transitioning to oral medication if continued improvement by 4/27  - Ambulatory sat 91% on room air - Blood cultures x2 NGTD  - CT chest with bilateral upper lobe predominant GGOs  - Started on Zosyn 4/24 overnight  - Consider transitioning to oral medication if continued improvement by 4/27  - Ambulatory sat 91% on room air, now feeling much improved on room air  - Will discharge with levaquin to complete 7 day course

## 2023-04-28 NOTE — PROGRESS NOTE ADULT - PROBLEM SELECTOR PLAN 1
- Toxicology consulted in ED and does not appear to be colchicine toxicity  - CT with patchy opacities in the lungs, possibly referred pain from pneumonia vs. gastritis/PUD iso high dose aspirin  - Procal 0.33  - TTE 4/23 with no pericardial effusion  - Start carafate; decrease protonix to 40 qD  - Improved

## 2023-04-28 NOTE — PROGRESS NOTE ADULT - PROBLEM SELECTOR PLAN 5
- Improved s/p 2L fluid bolus  - Will restart her home clonidine and hydralazine, hold enalapril in the setting of FARSHAD.  - Increased clonidine dose to 0.2 BID - Improved s/p 2L fluid bolus  - Can resume home meds on discharge

## 2023-04-30 LAB
CULTURE RESULTS: SIGNIFICANT CHANGE UP
CULTURE RESULTS: SIGNIFICANT CHANGE UP
SPECIMEN SOURCE: SIGNIFICANT CHANGE UP
SPECIMEN SOURCE: SIGNIFICANT CHANGE UP

## 2023-05-01 ENCOUNTER — NON-APPOINTMENT (OUTPATIENT)
Age: 73
End: 2023-05-01

## 2023-05-02 ENCOUNTER — APPOINTMENT (OUTPATIENT)
Dept: INTERNAL MEDICINE | Facility: CLINIC | Age: 73
End: 2023-05-02

## 2023-05-02 ENCOUNTER — APPOINTMENT (OUTPATIENT)
Dept: CARDIOLOGY | Facility: CLINIC | Age: 73
End: 2023-05-02

## 2023-05-09 ENCOUNTER — APPOINTMENT (OUTPATIENT)
Dept: INTERNAL MEDICINE | Facility: CLINIC | Age: 73
End: 2023-05-09

## 2023-06-14 NOTE — PROGRESS NOTE ADULT - PROBLEM/PLAN-9
DISPLAY PLAN FREE TEXT
DISPLAY PLAN FREE TEXT
Doxycycline Counseling:  Patient counseled regarding possible photosensitivity and increased risk for sunburn.  Patient instructed to avoid sunlight, if possible.  When exposed to sunlight, patients should wear protective clothing, sunglasses, and sunscreen.  The patient was instructed to call the office immediately if the following severe adverse effects occur:  hearing changes, easy bruising/bleeding, severe headache, or vision changes.  The patient verbalized understanding of the proper use and possible adverse effects of doxycycline.  All of the patient's questions and concerns were addressed.

## 2023-10-02 ENCOUNTER — RX RENEWAL (OUTPATIENT)
Age: 73
End: 2023-10-02

## 2023-10-02 RX ORDER — COLCHICINE 0.6 MG/1
0.6 TABLET ORAL
Qty: 180 | Refills: 3 | Status: ACTIVE | COMMUNITY
Start: 1900-01-01 | End: 1900-01-01

## 2023-10-04 ENCOUNTER — RX RENEWAL (OUTPATIENT)
Age: 73
End: 2023-10-04

## 2023-10-04 RX ORDER — APIXABAN 5 MG/1
5 TABLET, FILM COATED ORAL
Qty: 180 | Refills: 3 | Status: ACTIVE | COMMUNITY
Start: 1900-01-01 | End: 1900-01-01

## 2024-03-04 ENCOUNTER — RX RENEWAL (OUTPATIENT)
Age: 74
End: 2024-03-04

## 2024-05-23 ENCOUNTER — RX RENEWAL (OUTPATIENT)
Age: 74
End: 2024-05-23

## 2024-07-24 ENCOUNTER — RX RENEWAL (OUTPATIENT)
Age: 74
End: 2024-07-24

## 2024-11-04 ENCOUNTER — RX RENEWAL (OUTPATIENT)
Age: 74
End: 2024-11-04

## 2025-04-07 ENCOUNTER — RX RENEWAL (OUTPATIENT)
Age: 75
End: 2025-04-07

## 2025-07-02 NOTE — PROGRESS NOTE ADULT - PROBLEM/PLAN-2
DISPLAY PLAN FREE TEXT
General